# Patient Record
Sex: FEMALE | Race: BLACK OR AFRICAN AMERICAN | NOT HISPANIC OR LATINO | Employment: UNEMPLOYED | URBAN - METROPOLITAN AREA
[De-identification: names, ages, dates, MRNs, and addresses within clinical notes are randomized per-mention and may not be internally consistent; named-entity substitution may affect disease eponyms.]

---

## 2017-03-13 ENCOUNTER — THERAPY VISIT (OUTPATIENT)
Dept: PHYSICAL THERAPY | Facility: CLINIC | Age: 47
End: 2017-03-13
Payer: COMMERCIAL

## 2017-03-13 DIAGNOSIS — M54.41 BILATERAL LOW BACK PAIN WITH RIGHT-SIDED SCIATICA: Primary | ICD-10-CM

## 2017-03-13 PROCEDURE — 97161 PT EVAL LOW COMPLEX 20 MIN: CPT | Mod: GP | Performed by: PHYSICAL THERAPIST

## 2017-03-13 PROCEDURE — 97110 THERAPEUTIC EXERCISES: CPT | Mod: GP | Performed by: PHYSICAL THERAPIST

## 2017-03-13 NOTE — MR AVS SNAPSHOT
"              After Visit Summary   3/13/2017    Lay Solis    MRN: [id number]           Patient Information     Date Of Birth          1970        Visit Information        Provider Department      3/13/2017 5:10 PM uBcky Mahoney, PT [facility]        Today's Diagnoses     Bilateral low back pain with right-sided sciatica    -  1       Follow-ups after your visit        Your next 10 appointments already scheduled     Mar 15, 2017  4:40 PM CDT   [facility] with Lu Case, PTA   [facility] ([facility] [place])    [address] [address] [facility] [address] [facility] [address] [facility]  [address]  [place] [address] [phone]            Mar 21, 2017  4:40 PM CDT   [facility] with Lu Case, PTA   [facility] ([facility] [place])    [address] [facility] [address] [facility] [address] [facility]  [address]  [place] [id number] [phone]              Who to contact     If you have questions or need follow up information about today's clinic visit or your schedule please contact [facility] directly at [phone].  Normal or non-critical lab and imaging results will be communicated to you by Bizmorehart, letter or phone within 4 business days after the clinic has received the results. If you do not hear from us within 7 days, please contact the clinic through Bizmorehart or phone. If you have a critical or abnormal lab result, we will notify you by phone as soon as possible.  Submit refill requests through [employer] or call your pharmacy and they will forward the refill request to us. Please allow 3 business days for your refill to be completed.          Additional Information About Your Visit        Bizmorehart Information     Syn[employer]ricity lets you send messages to your doctor, view your test results, renew your prescriptions, schedule appointments and more. To sign up, go to [email or web address] . Click on \"Log in\" on the left side of the screen, which will take you to the Welcome page. Then click on \"Sign up Now\" on the right side of the page.     You will be asked to enter the access code " listed below, as well as some personal information. Please follow the directions to create your username and password.     Your access code is: [email or web address]  Expires: 2017  6:10 PM     Your access code will  in 90 days. If you need help or a new code, please call your [facility] clinic or [phone].        Care EveryWhere ID     This is your Care EveryWhere ID. This could be used by other organizations to access your [facility] medical records  [id number]         Blood Pressure from Last 3 Encounters:   No data found for BP    Weight from Last 3 Encounters:   No data found for Wt              We Performed the Following     PT Eval, Low Complexity ([id number])     Therapeutic Exercises        Primary Care Provider    None Specified       No primary provider on file.        Thank you!     Thank you for choosing [facility]  for your care. Our goal is always to provide you with excellent care. Hearing back from our patients is one way we can continue to improve our services. Please take a few minutes to complete the written survey that you may receive in the mail after your visit with us. Thank you!             Your Updated Medication List - Protect others around you: Learn how to safely use, store and throw away your medicines at www.disposemymeds.org.      Notice  As of 3/13/2017  6:10 PM    You have not been prescribed any medications.
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
## 2017-03-14 NOTE — PROGRESS NOTES
Subjective:                                       Pertinent medical history includes:  High blood pressure, thyroid problems and other (pain at night).  Medical allergies: no.  Other surgeries include:  Other.  Current medications:  None as reported by patient.  Current occupation is none.                                  Objective:    System    Physical Exam    General     ROS    Assessment/Plan:

## 2018-09-11 ENCOUNTER — OFFICE VISIT (OUTPATIENT)
Dept: FAMILY MEDICINE | Facility: CLINIC | Age: 48
End: 2018-09-11
Payer: COMMERCIAL

## 2018-09-11 VITALS
SYSTOLIC BLOOD PRESSURE: 132 MMHG | HEIGHT: 65 IN | OXYGEN SATURATION: 100 % | BODY MASS INDEX: 27.12 KG/M2 | WEIGHT: 162.8 LBS | HEART RATE: 91 BPM | RESPIRATION RATE: 18 BRPM | DIASTOLIC BLOOD PRESSURE: 84 MMHG | TEMPERATURE: 97.2 F

## 2018-09-11 DIAGNOSIS — M79.89 RIGHT LEG SWELLING: Primary | ICD-10-CM

## 2018-09-11 PROBLEM — E78.5 HYPERLIPIDEMIA: Status: ACTIVE | Noted: 2017-07-13

## 2018-09-11 PROBLEM — E11.29 TYPE 2 DIABETES MELLITUS WITH RENAL COMPLICATION (H): Status: ACTIVE | Noted: 2017-07-13

## 2018-09-11 PROBLEM — D64.9 ANEMIA: Status: ACTIVE | Noted: 2018-09-11

## 2018-09-11 PROCEDURE — 99203 OFFICE O/P NEW LOW 30 MIN: CPT | Performed by: NURSE PRACTITIONER

## 2018-09-11 RX ORDER — LISINOPRIL 40 MG/1
40 TABLET ORAL DAILY
COMMUNITY
Start: 2018-07-30 | End: 2018-11-15

## 2018-09-11 RX ORDER — LEVOTHYROXINE SODIUM 150 UG/1
1 TABLET ORAL DAILY
Refills: 0 | COMMUNITY
Start: 2018-08-06 | End: 2018-09-11

## 2018-09-11 RX ORDER — ASPIRIN 81 MG/1
81 TABLET ORAL DAILY
COMMUNITY
Start: 2017-07-13 | End: 2019-04-18

## 2018-09-11 RX ORDER — LISINOPRIL 40 MG/1
1 TABLET ORAL DAILY
Refills: 0 | COMMUNITY
Start: 2018-06-22 | End: 2018-09-11

## 2018-09-11 RX ORDER — LEVOTHYROXINE SODIUM 150 UG/1
150 TABLET ORAL DAILY
COMMUNITY
Start: 2018-07-31 | End: 2018-11-15

## 2018-09-11 ASSESSMENT — PAIN SCALES - GENERAL: PAINLEVEL: MILD PAIN (3)

## 2018-09-11 NOTE — PATIENT INSTRUCTIONS
[facility]'s central scheduling line at  [phone] for lymphedema therapy.     Lymphedema  The lymphatic system is made up of lymph vessels and lymph nodes, which carry a fluid called lymph. Lymph consists of waste from the cells. This fluid drains through lymph vessels under the skin to nearby lymph nodes. Lymph nodes filter waste products from the cells and kill any bacteria present before returning the lymph fluid to your blood circulation.  When the lymph vessels are damaged, lymph fluid cannot drain from tissues. This causes the lymph fluid to back up leading to swelling. This most often affects the arms or legs. Signs of lymphedema include heaviness, stiffness, or aching in an arm or leg. The limb may swell. The skin might look red. Shoes and rings may feel tight. Ankles and wrists might become less flexible.  The most common cause of damage to the lymph system is surgery or radiation for breast or testicular cancer. Other causes include repeated skin infections (cellulitis), burns, or injury to the arms or legs. It can take many years for symptoms of lymphedema to appear. Once present, lymphedema can become a chronic condition. This means the problem can be managed but not cured.   Treatment often includes use of compression garments, massage, and special exercises. Talk to your healthcare provider about these therapies and best treatment plan for you.  Home care  You can help keep the condition from getting worse. Follow all instructions you have been given. Do your exercises and wear your compression garments as recommended. Also, care for yourself as instructed.     Small skin injuries like a cut, burn, or insect bite are more likely to cause a skin infection. Take special care to avoid injury. If you have any signs of infection, call your healthcare provider right away.    Take care of your skin and nails. Moisturize dry skin. Wear protective gloves when doing chores such as  gardening.     Don't wear tight clothing or jewelry on the affected arm or leg. Avoid carrying bags or other weight on the affected arm.    Save with an electric razor instead of a razor blade.    If at all possible, don t have blood pressure taken, get injections, or have blood drawn in the affected arm.    If a leg is involved, don t cross your legs when sitting. Don't go barefoot.    Avoid hot tubs, steam rooms, and saunas.  If you are at risk for lymphedema but have not developed it, these tips can help also help prevent it. Follow your healthcare provider's instructions.  Follow-up care  Follow up with your healthcare provider, or as advised.  Lymphedema can change the appearance of your body. This can be emotionally difficult to adjust to. You may benefit from a support group where practical advice and emotional support is offered. Individual counseling is another option.  When to seek medical advice  Call your healthcare provider right away if any of these occur:    Swelling worsens    Rash, blistering, or other skin changes on the affected limb    Area of skin becomes red, painful, or warm to the touch    A wound increases in pain, becomes warm, drains pus, or radiates red streaks    Fever of 100.4 F (38 C) or higher, or as directed by your healthcare provider  Date Last Reviewed: 10/1/2016    0518-1695 [employer]. [address] [place] [address]. All rights reserved. This information is not intended as a substitute for professional medical care. Always follow your healthcare professional's instructions.      [facility]    If you have any questions regarding to your visit please contact your care team:     Team Pink:   Clinic Hours Telephone Number   Internal Medicine:  Dr. Angelica Devine NP 7am-7pm  Monday - Thursday   7am-5pm  Fridays  [phone]  (Appointment scheduling available 24/7)   Urgent Care - [facility]  [place] - 11am-9pm Monday-Friday Saturday-Sunday- 9am-5pm   [place] - 5pm-9pm Monday-Friday Saturday-Sunday- 9am-5pm  [phone] - [place]  [phone] - [place]       What options do I have for a visit other than an office visit? We offer electronic visits (e-visits) and telephone visits, when medically appropriate.  Please check with your medical insurance to see if these types of visits are covered, as you will be responsible for any charges that are not paid by your insurance.      You can use [employer] (secure electronic communication) to access to your chart, send your primary care provider a message, or make an appointment. Ask a team member how to get started.     For a price quote for your services, please call our Consumer Price Line at [phone] or our Imaging Cost estimation line at [phone] (for imaging tests).  Dorys Hardy CMA

## 2018-09-11 NOTE — PROGRESS NOTES
SUBJECTIVE:   Lay Solis is a 48 year old female who presents to clinic today for the following health issues:        Chief Complaint   Patient presents with     Swelling     right leg swelling- started 4 months ago it comes and goes     Patient notes ongoing swelling for 4 months to her right leg.  She had negative ultrasound for DVT 2 months ago.  Patient had varicose vein stripping 3 months ago to her right lower leg.  Patient notes persistent swelling to her right leg.  Swelling is intermittent.  Patient notes worsening symptoms after prolonged walking, in excessive heat.  Patient denies pain.  Patient has compression socks, but does not wear them consistently.  Patient denies chest pain, shortness of breath.    Problem list and histories reviewed & adjusted, as indicated.  Additional history: as documented    Patient Active Problem List   Diagnosis     Anemia     History of thyroid cancer     Hyperlipidemia     Hypertension, benign     Postsurgical hypothyroidism     Thyroid cancer (H)     Type 2 diabetes mellitus with renal complication (H)     Past Surgical History:   Procedure Laterality Date     ENT SURGERY      partial thyroidectomy     GI SURGERY      cesearean section       Social History   Substance Use Topics     Smoking status: Never Smoker     Smokeless tobacco: Never Used     Alcohol use No     History reviewed. No pertinent family history.      Current Outpatient Prescriptions   Medication Sig Dispense Refill     aspirin 81 MG EC tablet Take 81 mg by mouth daily       levothyroxine (SYNTHROID/LEVOTHROID) 150 MCG tablet Take 150 mcg by mouth daily       lisinopril (PRINIVIL/ZESTRIL) 40 MG tablet Take 40 mg by mouth daily       No Known Allergies  BP Readings from Last 3 Encounters:   09/11/18 132/84    Wt Readings from Last 3 Encounters:   09/11/18 162 lb 12.8 oz (73.8 kg)                  Labs reviewed in EPIC    Reviewed and updated as needed this visit by clinical staff       Reviewed and  "updated as needed this visit by Provider         ROS:  Constitutional, HEENT, cardiovascular, pulmonary, gi and gu systems are negative, except as otherwise noted.    OBJECTIVE:     /84  Pulse 91  Temp 97.2  F (36.2  C) (Oral)  Resp 18  Ht 5' 5\" (1.651 m)  Wt 162 lb 12.8 oz (73.8 kg)  LMP 08/20/2018 (Approximate)  SpO2 100%  BMI 27.09 kg/m2  Body mass index is 27.09 kg/(m^2).  GENERAL: healthy, alert and no distress  RESP: lungs clear to auscultation - no rales, rhonchi or wheezes  CV: regular rates and rhythm, normal S1 S2, no S3 or S4, no murmur, click or rub, peripheral pulses strong and 1-2+ right lower extremity pitting edema to mid calf    MS: no gross musculoskeletal defects noted, no edema    Diagnostic Test Results:  none     ASSESSMENT/PLAN:     1. Right leg swelling  Patient has had negative ultrasound since swelling started.  No signs of congestive heart failure and she has normal recent labs through her previous PCP.  Likely lymphedema vs chronic venous insufficiency.  Patient agrees to lymphedema therapy.  She also plans to touch base with her vein specialist as well.  - LYMPHEDEMA THERAPY REFERRAL    FUTURE APPOINTMENTS:       - Follow-up for annual visit or as needed    SUKHI Knapp [facility] LIL[facility]    "

## 2018-09-11 NOTE — MR AVS SNAPSHOT
After Visit Summary   9/11/2018    Lay Solis    MRN: [id number]           Patient Information     Date Of Birth          1970        Visit Information        Provider Department      9/11/2018 4:00 PM Sisi Devine APRN [facility]        Today's Diagnoses     Right leg swelling    -  1      Care Instructions    [facility] at  [phone] for lymphedema therapy.     Lymphedema  The lymphatic system is made up of lymph vessels and lymph nodes, which carry a fluid called lymph. Lymph consists of waste from the cells. This fluid drains through lymph vessels under the skin to nearby lymph nodes. Lymph nodes filter waste products from the cells and kill any bacteria present before returning the lymph fluid to your blood circulation.  When the lymph vessels are damaged, lymph fluid cannot drain from tissues. This causes the lymph fluid to back up leading to swelling. This most often affects the arms or legs. Signs of lymphedema include heaviness, stiffness, or aching in an arm or leg. The limb may swell. The skin might look red. Shoes and rings may feel tight. Ankles and wrists might become less flexible.  The most common cause of damage to the lymph system is surgery or radiation for breast or testicular cancer. Other causes include repeated skin infections (cellulitis), burns, or injury to the arms or legs. It can take many years for symptoms of lymphedema to appear. Once present, lymphedema can become a chronic condition. This means the problem can be managed but not cured.   Treatment often includes use of compression garments, massage, and special exercises. Talk to your healthcare provider about these therapies and best treatment plan for you.  Home care  You can help keep the condition from getting worse. Follow all instructions you have been given. Do your exercises and wear your compression garments as  recommended. Also, care for yourself as instructed.     Small skin injuries like a cut, burn, or insect bite are more likely to cause a skin infection. Take special care to avoid injury. If you have any signs of infection, call your healthcare provider right away.    Take care of your skin and nails. Moisturize dry skin. Wear protective gloves when doing chores such as gardening.     Don't wear tight clothing or jewelry on the affected arm or leg. Avoid carrying bags or other weight on the affected arm.    Save with an electric razor instead of a razor blade.    If at all possible, don t have blood pressure taken, get injections, or have blood drawn in the affected arm.    If a leg is involved, don t cross your legs when sitting. Don't go barefoot.    Avoid hot tubs, steam rooms, and saunas.  If you are at risk for lymphedema but have not developed it, these tips can help also help prevent it. Follow your healthcare provider's instructions.  Follow-up care  Follow up with your healthcare provider, or as advised.  Lymphedema can change the appearance of your body. This can be emotionally difficult to adjust to. You may benefit from a support group where practical advice and emotional support is offered. Individual counseling is another option.  When to seek medical advice  Call your healthcare provider right away if any of these occur:    Swelling worsens    Rash, blistering, or other skin changes on the affected limb    Area of skin becomes red, painful, or warm to the touch    A wound increases in pain, becomes warm, drains pus, or radiates red streaks    Fever of 100.4 F (38 C) or higher, or as directed by your healthcare provider  Date Last Reviewed: 10/1/2016    8806-8837 [employer]. [address] [place] [address]. All rights reserved. This information is not intended as a substitute for professional medical care. Always follow your healthcare professional's  instructions.      [facility]    If you have any questions regarding to your visit please contact your care team:     Team Pink:   Clinic Hours Telephone Number   Internal Medicine:  Dr. Angelica Devine NP 7am-7pm  Monday - Thursday   7am-5pm  Fridays  [phone]  (Appointment scheduling available 24/7)   Urgent Care - [place] and [facility] - 11am-9pm Monday-Friday Saturday-Sunday- 9am-5pm   [place] - 5pm-9pm Monday-Friday Saturday-Sunday- 9am-5pm  [phone] - [place]  [phone] - [place]       What options do I have for a visit other than an office visit? We offer electronic visits (e-visits) and telephone visits, when medically appropriate.  Please check with your medical insurance to see if these types of visits are covered, as you will be responsible for any charges that are not paid by your insurance.      You can use Service Seeking (secure electronic communication) to access to your chart, send your primary care provider a message, or make an appointment. Ask a team member how to get started.     For a price quote for your services, please call our Consumer Price Line at [phone] or our Imaging Cost estimation line at [phone] (for imaging tests).  Dorys Hardy CMA             Follow-ups after your visit        Additional Services     LYMPHEDEMA THERAPY REFERRAL       *This therapy referral will be filtered to a centralized scheduling office at [facility] and the patient will receive a call to schedule an appointment at a [place] location most convenient for them. *   If you have not heard from the scheduling office within 2 business days, please call [phone] for all locations, with the exception of [place], please call [phone].     Olegario August    Treatment: PT or OT Evaluation & Treatment  Special Instructions:   PT/OT Treatment Diagnosis: Edema    Please be aware that coverage of  "these services is subject to the terms and limitations of your health insurance plan.  Call member services at your health plan with any benefit or coverage questions.      **Note to Provider:  If you are referring outside of [place] for the therapy appointment, please list the name of the location in the \"special instructions\" above, print the referral and give to the patient to schedule the appointment.                  Who to contact     If you have questions or need follow up information about today's clinic visit or your schedule please contact [facility] directly at [phone].  Normal or non-critical lab and imaging results will be communicated to you by MyChart, letter or phone within 4 business days after the clinic has received the results. If you do not hear from us within 7 days, please contact the clinic through MyChart or phone. If you have a critical or abnormal lab result, we will notify you by phone as soon as possible.  Submit refill requests through TrendingGames or call your pharmacy and they will forward the refill request to us. Please allow 3 business days for your refill to be completed.          Additional Information About Your Visit        Care EveryWhere ID     This is your Care EveryWhere ID. This could be used by other organizations to access your [place] medical records  [id number]        Your Vitals Were     Pulse Temperature Respirations Height Last Period Pulse Oximetry    91 97.2  F (36.2  C) (Oral) 18 5' 5\" (1.651 m) 08/20/2018 (Approximate) 100%    BMI (Body Mass Index)                   27.09 kg/m2            Blood Pressure from Last 3 Encounters:   09/11/18 132/84    Weight from Last 3 Encounters:   09/11/18 162 lb 12.8 oz (73.8 kg)              We Performed the Following     LYMPHEDEMA THERAPY REFERRAL        Primary Care Provider Fax #    Physician No Ref-Primary [phone]       No address on file        Equal Access to Services     CATHY WAITE AH: Anibal humphries " irma [place], elisha soljuanha, qadilan kagael alyssaserene, waxdenia tasha dellhelio shahjoshua yesikafaribaangeli waltersZackbrooklynn yoni. So [facility] [phone].    ATENCIÓN: Si habla español, tiene a faustin disposición servicios gratuitos de asistencia lingüística. Rachel al [phone].    We comply with applicable federal civil rights laws and [place] laws. We do not discriminate on the basis of race, color, national origin, age, disability, sex, sexual orientation, or gender identity.            Thank you!     [facility]  [facility] your care. Our goal is always to provide you with excellent care. Hearing back from our patients is one way we can continue to improve our services. Please take a few minutes to complete the written survey that you may receive in the mail after your visit with us. Thank you!             Your Updated Medication List - Protect others around you: Learn how to safely use, store and throw away your medicines at www.disposemymeds.org.          This list is accurate as of 9/11/18  4:58 PM.  Always use your most recent med list.                   Brand Name Dispense Instructions for use Diagnosis    aspirin 81 MG EC tablet      Take 81 mg by mouth daily        levothyroxine 150 MCG tablet    SYNTHROID/LEVOTHROID     Take 150 mcg by mouth daily        lisinopril 40 MG tablet    PRINIVIL/ZESTRIL     Take 40 mg by mouth daily

## 2018-11-15 ENCOUNTER — OFFICE VISIT (OUTPATIENT)
Dept: FAMILY MEDICINE | Facility: CLINIC | Age: 48
End: 2018-11-15
Payer: COMMERCIAL

## 2018-11-15 VITALS
OXYGEN SATURATION: 100 % | DIASTOLIC BLOOD PRESSURE: 98 MMHG | HEART RATE: 85 BPM | TEMPERATURE: 96.6 F | HEIGHT: 65 IN | RESPIRATION RATE: 20 BRPM | SYSTOLIC BLOOD PRESSURE: 146 MMHG | BODY MASS INDEX: 26.66 KG/M2 | WEIGHT: 160 LBS

## 2018-11-15 DIAGNOSIS — Z11.4 SCREENING FOR HIV (HUMAN IMMUNODEFICIENCY VIRUS): ICD-10-CM

## 2018-11-15 DIAGNOSIS — Z23 NEED FOR PROPHYLACTIC VACCINATION WITH TETANUS-DIPHTHERIA (TD): ICD-10-CM

## 2018-11-15 DIAGNOSIS — Z13.89 SCREENING FOR DIABETIC PERIPHERAL NEUROPATHY: ICD-10-CM

## 2018-11-15 DIAGNOSIS — Z12.4 CERVICAL CANCER SCREENING: ICD-10-CM

## 2018-11-15 DIAGNOSIS — Z23 NEED FOR PROPHYLACTIC VACCINATION AND INOCULATION AGAINST INFLUENZA: ICD-10-CM

## 2018-11-15 DIAGNOSIS — E11.22 TYPE 2 DIABETES MELLITUS WITH CHRONIC KIDNEY DISEASE, WITHOUT LONG-TERM CURRENT USE OF INSULIN, UNSPECIFIED CKD STAGE (H): ICD-10-CM

## 2018-11-15 DIAGNOSIS — D50.9 IRON DEFICIENCY ANEMIA, UNSPECIFIED IRON DEFICIENCY ANEMIA TYPE: ICD-10-CM

## 2018-11-15 DIAGNOSIS — Z12.31 ENCOUNTER FOR SCREENING MAMMOGRAM FOR BREAST CANCER: ICD-10-CM

## 2018-11-15 DIAGNOSIS — Z23 NEED FOR PROPHYLACTIC VACCINATION AGAINST STREPTOCOCCUS PNEUMONIAE (PNEUMOCOCCUS): ICD-10-CM

## 2018-11-15 DIAGNOSIS — E78.5 HYPERLIPIDEMIA, UNSPECIFIED HYPERLIPIDEMIA TYPE: ICD-10-CM

## 2018-11-15 DIAGNOSIS — C73 THYROID CANCER (H): ICD-10-CM

## 2018-11-15 DIAGNOSIS — E89.0 POSTSURGICAL HYPOTHYROIDISM: ICD-10-CM

## 2018-11-15 DIAGNOSIS — Z00.00 ROUTINE HISTORY AND PHYSICAL EXAMINATION OF ADULT: Primary | ICD-10-CM

## 2018-11-15 DIAGNOSIS — I10 BENIGN ESSENTIAL HYPERTENSION: ICD-10-CM

## 2018-11-15 LAB
ANION GAP SERPL CALCULATED.3IONS-SCNC: 4 MMOL/L (ref 3–14)
BASOPHILS # BLD AUTO: 0.1 10E9/L (ref 0–0.2)
BASOPHILS NFR BLD AUTO: 0.9 %
BUN SERPL-MCNC: 11 MG/DL (ref 7–30)
CALCIUM SERPL-MCNC: 8.5 MG/DL (ref 8.5–10.1)
CHLORIDE SERPL-SCNC: 104 MMOL/L (ref 94–109)
CHOLEST SERPL-MCNC: 169 MG/DL
CO2 SERPL-SCNC: 29 MMOL/L (ref 20–32)
CREAT SERPL-MCNC: 0.68 MG/DL (ref 0.52–1.04)
CREAT UR-MCNC: 104 MG/DL
DIFFERENTIAL METHOD BLD: ABNORMAL
EOSINOPHIL # BLD AUTO: 0.2 10E9/L (ref 0–0.7)
EOSINOPHIL NFR BLD AUTO: 2.8 %
ERYTHROCYTE [DISTWIDTH] IN BLOOD BY AUTOMATED COUNT: 18.5 % (ref 10–15)
FERRITIN SERPL-MCNC: 3 NG/ML (ref 8–252)
GFR SERPL CREATININE-BSD FRML MDRD: >90 ML/MIN/1.7M2
GLUCOSE SERPL-MCNC: 104 MG/DL (ref 70–99)
HBA1C MFR BLD: 6.1 % (ref 0–5.6)
HCT VFR BLD AUTO: 28.2 % (ref 35–47)
HDLC SERPL-MCNC: 51 MG/DL
HGB BLD-MCNC: 8.3 G/DL (ref 11.7–15.7)
IRON SATN MFR SERPL: 4 % (ref 15–46)
IRON SERPL-MCNC: 14 UG/DL (ref 35–180)
LDLC SERPL CALC-MCNC: 98 MG/DL
LYMPHOCYTES # BLD AUTO: 0.9 10E9/L (ref 0.8–5.3)
LYMPHOCYTES NFR BLD AUTO: 14.8 %
MCH RBC QN AUTO: 20.6 PG (ref 26.5–33)
MCHC RBC AUTO-ENTMCNC: 29.4 G/DL (ref 31.5–36.5)
MCV RBC AUTO: 70 FL (ref 78–100)
MICROALBUMIN UR-MCNC: 111 MG/L
MICROALBUMIN/CREAT UR: 106.73 MG/G CR (ref 0–25)
MONOCYTES # BLD AUTO: 0.4 10E9/L (ref 0–1.3)
MONOCYTES NFR BLD AUTO: 6.2 %
NEUTROPHILS # BLD AUTO: 4.4 10E9/L (ref 1.6–8.3)
NEUTROPHILS NFR BLD AUTO: 75.3 %
NONHDLC SERPL-MCNC: 118 MG/DL
PLATELET # BLD AUTO: 365 10E9/L (ref 150–450)
POTASSIUM SERPL-SCNC: 3.6 MMOL/L (ref 3.4–5.3)
RBC # BLD AUTO: 4.02 10E12/L (ref 3.8–5.2)
SODIUM SERPL-SCNC: 137 MMOL/L (ref 133–144)
T4 FREE SERPL-MCNC: 1.07 NG/DL (ref 0.76–1.46)
TIBC SERPL-MCNC: 356 UG/DL (ref 240–430)
TRIGL SERPL-MCNC: 102 MG/DL
TSH SERPL DL<=0.005 MIU/L-ACNC: 0.29 MU/L (ref 0.4–4)
WBC # BLD AUTO: 5.8 10E9/L (ref 4–11)

## 2018-11-15 PROCEDURE — 82728 ASSAY OF FERRITIN: CPT | Performed by: NURSE PRACTITIONER

## 2018-11-15 PROCEDURE — 83036 HEMOGLOBIN GLYCOSYLATED A1C: CPT | Performed by: NURSE PRACTITIONER

## 2018-11-15 PROCEDURE — 84443 ASSAY THYROID STIM HORMONE: CPT | Performed by: NURSE PRACTITIONER

## 2018-11-15 PROCEDURE — 84439 ASSAY OF FREE THYROXINE: CPT | Performed by: NURSE PRACTITIONER

## 2018-11-15 PROCEDURE — 99207 C FOOT EXAM  NO CHARGE: CPT | Mod: 25 | Performed by: NURSE PRACTITIONER

## 2018-11-15 PROCEDURE — 83540 ASSAY OF IRON: CPT | Performed by: NURSE PRACTITIONER

## 2018-11-15 PROCEDURE — 82043 UR ALBUMIN QUANTITATIVE: CPT | Performed by: NURSE PRACTITIONER

## 2018-11-15 PROCEDURE — 83550 IRON BINDING TEST: CPT | Performed by: NURSE PRACTITIONER

## 2018-11-15 PROCEDURE — 80048 BASIC METABOLIC PNL TOTAL CA: CPT | Performed by: NURSE PRACTITIONER

## 2018-11-15 PROCEDURE — 99396 PREV VISIT EST AGE 40-64: CPT | Performed by: NURSE PRACTITIONER

## 2018-11-15 PROCEDURE — G0145 SCR C/V CYTO,THINLAYER,RESCR: HCPCS | Performed by: NURSE PRACTITIONER

## 2018-11-15 PROCEDURE — 36415 COLL VENOUS BLD VENIPUNCTURE: CPT | Performed by: NURSE PRACTITIONER

## 2018-11-15 PROCEDURE — 87624 HPV HI-RISK TYP POOLED RSLT: CPT | Performed by: NURSE PRACTITIONER

## 2018-11-15 PROCEDURE — 80061 LIPID PANEL: CPT | Performed by: NURSE PRACTITIONER

## 2018-11-15 PROCEDURE — 85025 COMPLETE CBC W/AUTO DIFF WBC: CPT | Performed by: NURSE PRACTITIONER

## 2018-11-15 RX ORDER — LISINOPRIL 40 MG/1
40 TABLET ORAL DAILY
Qty: 90 TABLET | Refills: 0 | Status: SHIPPED | OUTPATIENT
Start: 2018-11-15 | End: 2019-02-19

## 2018-11-15 RX ORDER — LEVOTHYROXINE SODIUM 150 UG/1
150 TABLET ORAL DAILY
Qty: 90 TABLET | Refills: 3 | Status: SHIPPED | OUTPATIENT
Start: 2018-11-15 | End: 2019-11-21

## 2018-11-15 ASSESSMENT — PAIN SCALES - GENERAL: PAINLEVEL: NO PAIN (0)

## 2018-11-15 NOTE — PATIENT INSTRUCTIONS
Preventive Health Recommendations  Female Ages 40 to 49    Yearly exam:     See your health care provider every year in order to  1. Review health changes.   2. Discuss preventive care.    3. Review your medicines if your doctor prescribed any.      Get a Pap test every three years (unless you have an abnormal result and your provider advises testing more often).      If you get Pap tests with HPV test, you only need to test every 5 years, unless you have an abnormal result. You do not need a Pap test if your uterus was removed (hysterectomy) and you have not had cancer.      You should be tested each year for STDs (sexually transmitted diseases), if you're at risk.     Ask your doctor if you should have a mammogram.      Have a colonoscopy (test for colon cancer) if someone in your family has had colon cancer or polyps before age 50.       Have a cholesterol test every 5 years.       Have a diabetes test (fasting glucose) after age 45. If you are at risk for diabetes, you should have this test every 3 years.    Shots: Get a flu shot each year. Get a tetanus shot every 10 years.     Nutrition:     Eat at least 5 servings of fruits and vegetables each day.    Eat whole-grain bread, whole-wheat pasta and brown rice instead of white grains and rice.    Get adequate Calcium and Vitamin D.      Lifestyle    Exercise at least 150 minutes a week (an average of 30 minutes a day, 5 days a week). This will help you control your weight and prevent disease.    Limit alcohol to one drink per day.    No smoking.     Wear sunscreen to prevent skin cancer.    See your dentist every six months for an exam and cleaning.      [facility]    If you have any questions regarding to your visit please contact your care team:     Team Pink:   Clinic Hours Telephone Number   Internal Medicine:  Dr. Angelica Devine NP 7am-7pm  Monday - Thursday   7am-5pm  Fridays  [phone]  (Appointment  scheduling available 24/7)   Urgent Care - [place] - 11am-9pm Monday-Friday Saturday-Sunday- 9am-5pm   [place] - 5pm-9pm Monday-Friday Saturday-Sunday- 9am-5pm  [phone] - [place]  [phone] - [place]       What options do I have for a visit other than an office visit? We offer electronic visits (e-visits) and telephone visits, when medically appropriate.  Please check with your medical insurance to see if these types of visits are covered, as you will be responsible for any charges that are not paid by your insurance.      You can use Atari (secure electronic communication) to access to your chart, send your primary care provider a message, or make an appointment. Ask a team member how to get started.     For a price quote for your services, please call our Consumer Price Line at [phone] or our Imaging Cost estimation line at [phone] (for imaging tests).  Dorys MENDES CMA

## 2018-11-15 NOTE — MR AVS SNAPSHOT
After Visit Summary   11/15/2018    Lay Solis    MRN: [id number]           Patient Information     Date Of Birth          1970        Visit Information        Provider Department      11/15/2018 7:40 AM Sisi Devine APRN [facility]        Today's Diagnoses     Routine history and physical examination of adult    -  1    Screening for HIV (human immunodeficiency virus)        Screening for diabetic peripheral neuropathy        Need for prophylactic vaccination and inoculation against influenza        Need for prophylactic vaccination against Streptococcus pneumoniae (pneumococcus)        Need for prophylactic vaccination with tetanus-diphtheria (Td)        Cervical cancer screening        Encounter for screening mammogram for breast cancer        Iron deficiency anemia, unspecified iron deficiency anemia type        Type 2 diabetes mellitus with chronic kidney disease, without long-term current use of insulin, unspecified CKD stage (H)        Thyroid cancer (H)        Hyperlipidemia, unspecified hyperlipidemia type        Postsurgical hypothyroidism        Benign essential hypertension          Care Instructions      Preventive Health Recommendations  Female Ages 40 to 49    Yearly exam:     See your health care provider every year in order to  1. Review health changes.   2. Discuss preventive care.    3. Review your medicines if your doctor prescribed any.      Get a Pap test every three years (unless you have an abnormal result and your provider advises testing more often).      If you get Pap tests with HPV test, you only need to test every 5 years, unless you have an abnormal result. You do not need a Pap test if your uterus was removed (hysterectomy) and you have not had cancer.      You should be tested each year for STDs (sexually transmitted diseases), if you're at risk.     Ask your doctor if you should have a mammogram.      Have a colonoscopy (test for  colon cancer) if someone in your family has had colon cancer or polyps before age 50.       Have a cholesterol test every 5 years.       Have a diabetes test (fasting glucose) after age 45. If you are at risk for diabetes, you should have this test every 3 years.    Shots: Get a flu shot each year. Get a tetanus shot every 10 years.     Nutrition:     Eat at least 5 servings of fruits and vegetables each day.    Eat whole-grain bread, whole-wheat pasta and brown rice instead of white grains and rice.    Get adequate Calcium and Vitamin D.      Lifestyle    Exercise at least 150 minutes a week (an average of 30 minutes a day, 5 days a week). This will help you control your weight and prevent disease.    Limit alcohol to one drink per day.    No smoking.     Wear sunscreen to prevent skin cancer.    See your dentist every six months for an exam and cleaning.      [facility]    If you have any questions regarding to your visit please contact your care team:     Team Pink:   Clinic Hours Telephone Number   Internal Medicine:  Dr. Angelica Devine NP 7am-7pm  Monday - Thursday   7am-5pm  Fridays  [phone]  (Appointment scheduling available 24/7)   [facility] - 11am-9pm Monday-Friday Saturday-Sunday- 9am-5pm   [place] - 5pm-9pm Monday-Friday Saturday-Sunday- 9am-5pm  [phone] - [place]  [phone] - [place]       What options do I have for a visit other than an office visit? We offer electronic visits (e-visits) and telephone visits, when medically appropriate.  Please check with your medical insurance to see if these types of visits are covered, as you will be responsible for any charges that are not paid by your insurance.      You can use Booktrope (secure electronic communication) to access to your chart, send your primary care provider a message, or make an appointment. Ask a team member how to get started.  "    For a price quote for your services, please call our Consumer Price Line at [phone] or our Imaging Cost estimation line at [phone] (for imaging tests).  Dorys MENDES CMA            Follow-ups after your visit        Follow-up notes from your care team     Return in about 2 weeks (around 11/29/2018) for BP Recheck.      Future tests that were ordered for you today     Open Future Orders        Priority Expected Expires Ordered    *MA Screening Digital Bilateral Routine  11/15/2019 11/15/2018            Who to contact     If you have questions or need follow up information about today's clinic visit or your schedule please contact [facility] directly at [phone].  Normal or non-critical lab and imaging results will be communicated to you by MyChart, letter or phone within 4 business days after the clinic has received the results. If you do not hear from us within 7 days, please contact the clinic through MyChart or phone. If you have a critical or abnormal lab result, we will notify you by phone as soon as possible.  Submit refill requests through Tiempo or call your pharmacy and they will forward the refill request to us. Please allow 3 business days for your refill to be completed.          Additional Information About Your Visit        Care EveryWhere ID     This is your Care EveryWhere ID. This could be used by other organizations to access your [place] medical records  [id number]        Your Vitals Were     Pulse Temperature Respirations Height Pulse Oximetry BMI (Body Mass Index)    85 96.6  F (35.9  C) (Oral) 20 5' 5\" (1.651 m) 100% 26.63 kg/m2       Blood Pressure from Last 3 Encounters:   11/15/18 (!) 146/98   09/11/18 132/84    Weight from Last 3 Encounters:   11/15/18 160 lb (72.6 kg)   09/11/18 162 lb 12.8 oz (73.8 kg)              We Performed the Following     Albumin Random Urine Quantitative with Creat Ratio     Basic metabolic panel     CBC with platelets differential  "    Ferritin     FOOT EXAM  NO CHARGE [[id number]]     HEMOGLOBIN A1C     HPV High Risk Types DNA Cervical     Iron and iron binding capacity     Lipid panel reflex to direct LDL Fasting     Pap imaged thin layer screen with HPV - recommended age 30 - 65 years (select HPV order below)     TSH with free T4 reflex          Where to get your medicines      These medications were sent to [id number] - [place], [place] - [address]  [address], [place] [address]     Phone:  [phone]     levothyroxine 150 MCG tablet    lisinopril 40 MG tablet          Primary Care Provider Fax #    Physician No Ref-Primary [phone]       No address on file        Equal Access to Services     [facility] : Hadii yandel [place], waaxda lusantana, qaybta kaalmada kate, nikki flores . So [facility] [place] [facility] [phone].    ATENCIÓN: Si habla español, tiene a faustin disposición servicios gratuitos de asistencia lingüística. [facility] [phone].    We comply with applicable federal civil rights laws and [place] laws. We do not discriminate on the basis of race, color, national origin, age, disability, sex, sexual orientation, or gender identity.            Thank you!     Thank you for choosing [facility]  for your care. Our goal is always to provide you with excellent care. Hearing back from our patients is one way we can continue to improve our services. Please take a few minutes to complete the written survey that you may receive in the mail after your visit with us. Thank you!             Your Updated Medication List - Protect others around you: Learn how to safely use, store and throw away your medicines at www.disposemymeds.org.          This list is accurate as of 11/15/18  8:27 AM.  Always use your most recent med list.                   Brand Name Dispense Instructions for use Diagnosis    aspirin 81 MG EC tablet      Take 81 mg by mouth daily        levothyroxine 150 MCG  tablet    SYNTHROID/LEVOTHROID    90 tablet    Take 1 tablet (150 mcg) by mouth daily    Postsurgical hypothyroidism       lisinopril 40 MG tablet    PRINIVIL/ZESTRIL    90 tablet    Take 1 tablet (40 mg) by mouth daily    Benign essential hypertension

## 2018-11-15 NOTE — PROGRESS NOTES
SUBJECTIVE:   CC: Lay Solis is an 48 year old woman who presents for preventive health visit.     Healthy Habits:    Do you get at least three servings of calcium containing foods daily (dairy, green leafy vegetables, etc.)? yes    Amount of exercise or daily activities, outside of work: 3 day(s) per week    Problems taking medications regularly No    Medication side effects: No    Have you had an eye exam in the past two years? yes    Do you see a dentist twice per year? yes    Do you have sleep apnea, excessive snoring or daytime drowsiness?no      Diabetes Follow-up    Patient is checking blood sugars: once daily.  Results are as follows:         am - 120    Diabetic concerns: None     Symptoms of hypoglycemia (low blood sugar): none     Paresthesias (numbness or burning in feet) or sores: No     Date of last diabetic eye exam: Up to date    Diabetes Management Resources    Hyperlipidemia Follow-Up      Rate your low fat/cholesterol diet?: good    Taking statin?  No    Other lipid medications/supplements?:  none    Hypertension Follow-up      Outpatient blood pressures are not being checked.    Low Salt Diet: no added salt    Patient has been out of lisinopril for the past 4 days.    BP Readings from Last 2 Encounters:   11/15/18 (!) 146/98   09/11/18 132/84     Hemoglobin A1C (%)   Date Value   11/15/2018 6.1 (H)       Today's PHQ-2 Score:   PHQ-2 ( 1999 Pfizer) 11/15/2018   Q1: Little interest or pleasure in doing things 0   Q2: Feeling down, depressed or hopeless 0   PHQ-2 Score 0       Abuse: Current or Past(Physical, Sexual or Emotional)- No  Do you feel safe in your environment - Yes    Social History   Substance Use Topics     Smoking status: Never Smoker     Smokeless tobacco: Never Used     Alcohol use No     If you drink alcohol do you typically have >3 drinks per day or >7 drinks per week? No                     Reviewed orders with patient.  Reviewed health maintenance and updated orders  accordingly - Yes  Labs reviewed in EPIC  BP Readings from Last 3 Encounters:   11/15/18 (!) 146/98   09/11/18 132/84    Wt Readings from Last 3 Encounters:   11/15/18 160 lb (72.6 kg)   09/11/18 162 lb 12.8 oz (73.8 kg)                  Patient Active Problem List   Diagnosis     Anemia     History of thyroid cancer     Hyperlipidemia     Hypertension, benign     Postsurgical hypothyroidism     Thyroid cancer (H)     Type 2 diabetes mellitus with renal complication (H)     Past Surgical History:   Procedure Laterality Date     ENT SURGERY      partial thyroidectomy     GI SURGERY      cesearean section       Social History   Substance Use Topics     Smoking status: Never Smoker     Smokeless tobacco: Never Used     Alcohol use No     History reviewed. No pertinent family history.      No Known Allergies  Recent Labs   Lab Test  11/15/18   0836  01/21/11   1655   A1C  6.1*   --    TSH   --   9.39*        Patient under age 50, mutual decision reflected in health maintenance.      Pertinent mammograms are reviewed under the imaging tab.  History of abnormal Pap smear: NO - age 30- 65 PAP every 3 years recommended     Reviewed and updated as needed this visit by clinical staff  Tobacco  Allergies  Meds  Med Hx  Surg Hx  Fam Hx  Soc Hx        Reviewed and updated as needed this visit by Provider  Meds            ROS:  CONSTITUTIONAL: NEGATIVE for fever, chills, change in weight  INTEGUMENTARU/SKIN: NEGATIVE for worrisome rashes, moles or lesions  EYES: NEGATIVE for vision changes or irritation  ENT: NEGATIVE for ear, mouth and throat problems  RESP: NEGATIVE for significant cough or SOB  BREAST: NEGATIVE for masses, tenderness or discharge  CV: NEGATIVE for chest pain, palpitations or peripheral edema  GI: NEGATIVE for nausea, abdominal pain, heartburn, or change in bowel habits  : NEGATIVE for unusual urinary or vaginal symptoms. Periods are regular.  MUSCULOSKELETAL: NEGATIVE for significant arthralgias or  "myalgia  NEURO: NEGATIVE for weakness, dizziness or paresthesias  PSYCHIATRIC: NEGATIVE for changes in mood or affect    OBJECTIVE:   BP (!) 146/98  Pulse 85  Temp 96.6  F (35.9  C) (Oral)  Resp 20  Ht 5' 5\" (1.651 m)  Wt 160 lb (72.6 kg)  SpO2 100%  BMI 26.63 kg/m2  EXAM:  GENERAL: healthy, alert and no distress  EYES: Eyes grossly normal to inspection, PERRL and conjunctivae and sclerae normal  HENT: ear canals and TM's normal, nose and mouth without ulcers or lesions  NECK: no adenopathy, no asymmetry, masses, or scars and thyroid normal to palpation  RESP: lungs clear to auscultation - no rales, rhonchi or wheezes  BREAST: normal without masses, tenderness or nipple discharge and no palpable axillary masses or adenopathy  CV: regular rate and rhythm, normal S1 S2, no S3 or S4, no murmur, click or rub, no peripheral edema and peripheral pulses strong  ABDOMEN: soft, nontender, no hepatosplenomegaly, no masses and bowel sounds normal   (female): normal urethral meatus , vaginal mucosa pink, moist, well rugated, normal cervix, adnexae, and uterus without masses, clitoris and labia minora absent.  MS: no gross musculoskeletal defects noted, no edema  PSYCH: mentation appears normal, affect normal/bright  LYMPH: no cervical, supraclavicular, axillary, or inguinal adenopathy    Diagnostic Test Results:  pending    ASSESSMENT/PLAN:   1. Routine history and physical examination of adult      2. Type 2 diabetes mellitus with chronic kidney disease, without long-term current use of insulin, unspecified CKD stage (H)    - HEMOGLOBIN A1C  - Albumin Random Urine Quantitative with Creat Ratio    3. Thyroid cancer (H)  Patient to follow-up with endocrinology next week.    4. Screening for HIV (human immunodeficiency virus)  Patient declines    5. Screening for diabetic peripheral neuropathy    - FOOT EXAM  NO CHARGE [53317.114]    6. Need for prophylactic vaccination and inoculation against influenza  Patient " "declines.    7. Need for prophylactic vaccination against Streptococcus pneumoniae (pneumococcus)  Patient declines.    8. Need for prophylactic vaccination with tetanus-diphtheria (Td)  Patient declines.    9. Cervical cancer screening    - Pap imaged thin layer screen with HPV - recommended age 30 - 65 years (select HPV order below)  - HPV High Risk Types DNA Cervical    10. Encounter for screening mammogram for breast cancer    - *MA Screening Digital Bilateral; Future    11. Iron deficiency anemia, unspecified iron deficiency anemia type  Recheck.  - Iron and iron binding capacity  - Ferritin  - CBC with platelets differential    12. Hyperlipidemia, unspecified hyperlipidemia type    - Lipid panel reflex to direct LDL Fasting    13. Postsurgical hypothyroidism  Stable.  Continue current treatment plan and medications.    - levothyroxine (SYNTHROID/LEVOTHROID) 150 MCG tablet; Take 1 tablet (150 mcg) by mouth daily  Dispense: 90 tablet; Refill: 3  - TSH with free T4 reflex    14. Benign essential hypertension  Stable.  Continue current treatment plan and medications.    - lisinopril (PRINIVIL/ZESTRIL) 40 MG tablet; Take 1 tablet (40 mg) by mouth daily  Dispense: 90 tablet; Refill: 0  - Basic metabolic panel    COUNSELING:   Reviewed preventive health counseling, as reflected in patient instructions       Regular exercise       Healthy diet/nutrition       Immunizations    Declined: Influenza, Pneumococcal and TDAP due to Conscientious objector            BP Readings from Last 1 Encounters:   11/15/18 (!) 146/98     Estimated body mass index is 26.63 kg/(m^2) as calculated from the following:    Height as of this encounter: 5' 5\" (1.651 m).    Weight as of this encounter: 160 lb (72.6 kg).      Weight management plan: Discussed healthy diet and exercise guidelines and patient will follow up in 12 months in clinic to re-evaluate.     reports that she has never smoked. She has never used smokeless " tobacco.      Counseling Resources:  ATP IV Guidelines  Pooled Cohorts Equation Calculator  Breast Cancer Risk Calculator  FRAX Risk Assessment  ICSI Preventive Guidelines  Dietary Guidelines for Americans, 2010  USDA's MyPlate  ASA Prophylaxis  Lung CA Screening    SUKHI Knapp CNP  [facility]

## 2018-11-19 LAB
COPATH REPORT: NORMAL
PAP: NORMAL

## 2018-11-19 RX ORDER — FERROUS SULFATE 325(65) MG
325 TABLET ORAL EVERY OTHER DAY
Qty: 30 TABLET | Refills: 2 | Status: SHIPPED | OUTPATIENT
Start: 2018-11-19 | End: 2019-07-19

## 2018-11-21 LAB
FINAL DIAGNOSIS: NORMAL
HPV HR 12 DNA CVX QL NAA+PROBE: NEGATIVE
HPV16 DNA SPEC QL NAA+PROBE: NEGATIVE
HPV18 DNA SPEC QL NAA+PROBE: NEGATIVE
SPECIMEN DESCRIPTION: NORMAL
SPECIMEN SOURCE CVX/VAG CYTO: NORMAL

## 2019-01-14 NOTE — PROGRESS NOTES
SUBJECTIVE:   Lay Solis is a 48 year old female who presents to clinic today for the following health issues:    Chief Complaint   Patient presents with     Hypertension     Anemia       Hypertension Follow-up      Outpatient blood pressures are not being checked.    Low Salt Diet: not monitoring salt      Amount of exercise or physical activity: 2-3 days/week for an average of 30 minutes    Problems taking medications regularly: No    Medication side effects: none    Diet: regular (no restrictions)    Patient notes difficulty falling asleep.  She notes she sleeps for a short time and then wakes up and can't get back to sleep.  She does not snore.  She notes chronic fatigue.  Patient does not nap.  Patient has tried over the counter medication for sleep without much improvement.      Problem list and histories reviewed & adjusted, as indicated.  Additional history: as documented    Patient Active Problem List   Diagnosis     Anemia     History of thyroid cancer     Hyperlipidemia     Hypertension, benign     Postsurgical hypothyroidism     Thyroid cancer (H)     Type 2 diabetes mellitus with renal complication (H)     Past Surgical History:   Procedure Laterality Date     ENT SURGERY      partial thyroidectomy     GI SURGERY      cesearean section       Social History     Tobacco Use     Smoking status: Never Smoker     Smokeless tobacco: Never Used   Substance Use Topics     Alcohol use: No     History reviewed. No pertinent family history.      Current Outpatient Medications   Medication Sig Dispense Refill     ferrous sulfate (IRON) 325 (65 Fe) MG tablet Take 1 tablet (325 mg) by mouth every other day 30 tablet 2     hydrochlorothiazide (HYDRODIURIL) 12.5 MG tablet Take 1 tablet (12.5 mg) by mouth daily 30 tablet 1     levothyroxine (SYNTHROID/LEVOTHROID) 150 MCG tablet Take 1 tablet (150 mcg) by mouth daily 90 tablet 3     lisinopril (PRINIVIL/ZESTRIL) 40 MG tablet Take 1 tablet (40 mg) by mouth daily 90  "tablet 0     STATIN NOT PRESCRIBED (INTENTIONAL) 1 each daily Please choose reason not prescribed, below       traZODone (DESYREL) 50 MG tablet Take 1 tablet (50 mg) by mouth At Bedtime 90 tablet 1     aspirin 81 MG EC tablet Take 81 mg by mouth daily       No Known Allergies  BP Readings from Last 3 Encounters:   01/16/19 138/90   11/15/18 (!) 146/98   09/11/18 132/84    Wt Readings from Last 3 Encounters:   01/16/19 73.4 kg (161 lb 12.8 oz)   11/15/18 72.6 kg (160 lb)   09/11/18 73.8 kg (162 lb 12.8 oz)                  Labs reviewed in EPIC    Reviewed and updated as needed this visit by clinical staff  Allergies  Meds       Reviewed and updated as needed this visit by Provider         ROS:  Constitutional, HEENT, cardiovascular, pulmonary, gi and gu systems are negative, except as otherwise noted.    OBJECTIVE:     /90   Pulse 80   Temp 96.7  F (35.9  C) (Oral)   Resp 16   Ht 1.651 m (5' 5\")   Wt 73.4 kg (161 lb 12.8 oz)   SpO2 98%   BMI 26.92 kg/m    Body mass index is 26.92 kg/m .  GENERAL: healthy, alert and no distress  RESP: lungs clear to auscultation - no rales, rhonchi or wheezes  CV: regular rate and rhythm, normal S1 S2, no S3 or S4, no murmur, click or rub, no peripheral edema and peripheral pulses strong  MS: no gross musculoskeletal defects noted, no edema    Diagnostic Test Results:  pending    ASSESSMENT/PLAN:     1. Iron deficiency anemia, unspecified iron deficiency anemia type  Recheck today.  - Hemoglobin  - Ferritin  - Iron and iron binding capacity    2. Hypertension, benign  Blood pressure uncontrolled.  Add hydrochlorothiazide .  - hydrochlorothiazide (HYDRODIURIL) 12.5 MG tablet; Take 1 tablet (12.5 mg) by mouth daily  Dispense: 30 tablet; Refill: 1    3. Type 2 diabetes mellitus with chronic kidney disease, without long-term current use of insulin, unspecified CKD stage (H)  Patient declines statin.  - STATIN NOT PRESCRIBED (INTENTIONAL); 1 each daily Please choose reason " not prescribed, below    4. Sleep disturbance    - traZODone (DESYREL) 50 MG tablet; Take 1 tablet (50 mg) by mouth At Bedtime  Dispense: 90 tablet; Refill: 1    FUTURE APPOINTMENTS:       - Follow-up visit in 2 weeks.    SUKHI Knapp [facility]

## 2019-01-16 ENCOUNTER — OFFICE VISIT (OUTPATIENT)
Dept: FAMILY MEDICINE | Facility: CLINIC | Age: 49
End: 2019-01-16
Payer: COMMERCIAL

## 2019-01-16 ENCOUNTER — OFFICE VISIT (OUTPATIENT)
Dept: OPTOMETRY | Facility: CLINIC | Age: 49
End: 2019-01-16
Payer: COMMERCIAL

## 2019-01-16 VITALS
HEIGHT: 65 IN | HEART RATE: 80 BPM | RESPIRATION RATE: 16 BRPM | SYSTOLIC BLOOD PRESSURE: 138 MMHG | WEIGHT: 161.8 LBS | DIASTOLIC BLOOD PRESSURE: 90 MMHG | TEMPERATURE: 96.7 F | BODY MASS INDEX: 26.96 KG/M2 | OXYGEN SATURATION: 98 %

## 2019-01-16 DIAGNOSIS — I10 HYPERTENSION, BENIGN: ICD-10-CM

## 2019-01-16 DIAGNOSIS — H52.12 MYOPIA, LEFT: ICD-10-CM

## 2019-01-16 DIAGNOSIS — E11.22 TYPE 2 DIABETES MELLITUS WITH CHRONIC KIDNEY DISEASE, WITHOUT LONG-TERM CURRENT USE OF INSULIN, UNSPECIFIED CKD STAGE (H): ICD-10-CM

## 2019-01-16 DIAGNOSIS — D50.9 IRON DEFICIENCY ANEMIA, UNSPECIFIED IRON DEFICIENCY ANEMIA TYPE: Primary | ICD-10-CM

## 2019-01-16 DIAGNOSIS — G47.9 SLEEP DISTURBANCE: ICD-10-CM

## 2019-01-16 DIAGNOSIS — E11.9 TYPE 2 DIABETES MELLITUS WITHOUT RETINOPATHY (H): ICD-10-CM

## 2019-01-16 DIAGNOSIS — H52.4 PRESBYOPIA: ICD-10-CM

## 2019-01-16 DIAGNOSIS — Z01.01 ENCOUNTER FOR EXAMINATION OF EYES AND VISION WITH ABNORMAL FINDINGS: Primary | ICD-10-CM

## 2019-01-16 LAB
FERRITIN SERPL-MCNC: 10 NG/ML (ref 8–252)
HGB BLD-MCNC: 9.6 G/DL (ref 11.7–15.7)
IRON SATN MFR SERPL: 6 % (ref 15–46)
IRON SERPL-MCNC: 19 UG/DL (ref 35–180)
TIBC SERPL-MCNC: 334 UG/DL (ref 240–430)

## 2019-01-16 PROCEDURE — 99214 OFFICE O/P EST MOD 30 MIN: CPT | Performed by: NURSE PRACTITIONER

## 2019-01-16 PROCEDURE — 83540 ASSAY OF IRON: CPT | Performed by: NURSE PRACTITIONER

## 2019-01-16 PROCEDURE — 92015 DETERMINE REFRACTIVE STATE: CPT | Performed by: OPTOMETRIST

## 2019-01-16 PROCEDURE — 83550 IRON BINDING TEST: CPT | Performed by: NURSE PRACTITIONER

## 2019-01-16 PROCEDURE — 36415 COLL VENOUS BLD VENIPUNCTURE: CPT | Performed by: NURSE PRACTITIONER

## 2019-01-16 PROCEDURE — 82728 ASSAY OF FERRITIN: CPT | Performed by: NURSE PRACTITIONER

## 2019-01-16 PROCEDURE — 85018 HEMOGLOBIN: CPT | Performed by: NURSE PRACTITIONER

## 2019-01-16 PROCEDURE — 92004 COMPRE OPH EXAM NEW PT 1/>: CPT | Performed by: OPTOMETRIST

## 2019-01-16 RX ORDER — HYDROCHLOROTHIAZIDE 12.5 MG/1
12.5 TABLET ORAL DAILY
Qty: 30 TABLET | Refills: 1 | Status: SHIPPED | OUTPATIENT
Start: 2019-01-16 | End: 2019-04-18

## 2019-01-16 RX ORDER — TRAZODONE HYDROCHLORIDE 50 MG/1
50 TABLET, FILM COATED ORAL AT BEDTIME
Qty: 90 TABLET | Refills: 1 | Status: SHIPPED | OUTPATIENT
Start: 2019-01-16 | End: 2019-04-18

## 2019-01-16 ASSESSMENT — EXTERNAL EXAM - RIGHT EYE: OD_EXAM: NORMAL

## 2019-01-16 ASSESSMENT — REFRACTION_MANIFEST
OD_ADD: +2.00
OS_CYLINDER: SPHERE
OD_CYLINDER: SPHERE
OD_SPHERE: PLANO
OS_SPHERE: -0.50
OS_ADD: +2.00

## 2019-01-16 ASSESSMENT — TONOMETRY
OD_IOP_MMHG: 17
OS_IOP_MMHG: 16
IOP_METHOD: APPLANATION

## 2019-01-16 ASSESSMENT — CUP TO DISC RATIO
OS_RATIO: 0.3
OD_RATIO: 0.3

## 2019-01-16 ASSESSMENT — VISUAL ACUITY
METHOD: SNELLEN - LINEAR
OS_SC: 20/120
OD_SC: 20/20
OD_SC+: -1
OD_SC: 20/120
OS_SC: 20/25

## 2019-01-16 ASSESSMENT — SLIT LAMP EXAM - LIDS
COMMENTS: NORMAL
COMMENTS: NORMAL

## 2019-01-16 ASSESSMENT — EXTERNAL EXAM - LEFT EYE: OS_EXAM: NORMAL

## 2019-01-16 ASSESSMENT — CONF VISUAL FIELD
OS_NORMAL: 1
OD_NORMAL: 1

## 2019-01-16 ASSESSMENT — MIFFLIN-ST. JEOR: SCORE: 1364.8

## 2019-01-16 ASSESSMENT — PAIN SCALES - GENERAL: PAINLEVEL: NO PAIN (0)

## 2019-01-16 NOTE — PATIENT INSTRUCTIONS
[facility]    If you have any questions regarding to your visit please contact your care team:     Team Pink:   Clinic Hours Telephone Number   Internal Medicine:  Dr. Angelica Devine NP 7am-7pm  Monday - Thursday   7am-5pm  Fridays  [phone]  (Appointment scheduling available 24/7)   Urgent Care - [place] [facility] - 11am-9pm Monday-Friday Saturday-Sunday- 9am-5pm   [place] - 5pm-9pm Monday-Friday Saturday-Sunday- 9am-5pm  [phone] - [place]  [phone] - [place]       What options do I have for a visit other than an office visit? We offer electronic visits (e-visits) and telephone visits, when medically appropriate.  Please check with your medical insurance to see if these types of visits are covered, as you will be responsible for any charges that are not paid by your insurance.      You can use [employer] (secure electronic communication) to access to your chart, send your primary care provider a message, or make an appointment. Ask a team member how to get started.     For a price quote for your services, please call our Consumer Price Line at [phone] or our Imaging Cost estimation line at [phone] (for imaging tests).    Meghan Sow, [facility]

## 2019-01-16 NOTE — RESULT ENCOUNTER NOTE
Dear Lay,    Your recent test results are attached.      Improving anemia.  Please continue iron supplement every other day.  We'll discuss recheck again at your follow-up appointment in 2 weeks.    If you have any questions please feel free to contact [phone] or myself via [employer].    Sincerely,  Sisi Devine, CNP

## 2019-01-16 NOTE — PROGRESS NOTES
Chief Complaint   Patient presents with     Diabetic Eye Exam     Accompanied by self  Lab Results   Component Value Date    A1C 6.1 11/15/2018       Last Eye Exam: 1 year ago  Dilated Previously: Yes    What are you currently using to see?  Glasses for reading-patient lost her glasses    Distance Vision Acuity: Noticed gradual change in both eyes    Near Vision Acuity: Not satisfied     Eye Comfort: good  Do you use eye drops? : No  Occupation or Hobbies: childcare    Velma Rodrigues, [email or web address]     Medical, surgical and family histories reviewed and updated 1/16/2019.       OBJECTIVE: See Ophthalmology exam    ASSESSMENT:    ICD-10-CM    1. Encounter for examination of eyes and vision with abnormal findings Z01.01    2. Type 2 diabetes mellitus without retinopathy (H) E11.9    3. Myopia, left H52.12    4. Presbyopia H52.4       PLAN:    Lay Solis aware  eye exam results will be sent to No Ref-Primary, Physician.  Patient Instructions   Dilation deferred today. Patient will return for dilated portion of exam within 1 month.     Patient educated on importance of good blood sugar control.  Letter sent to primary care provider with diabetic eye exam report.     Undilated ocular health within normal limits.   Glasses Rx provided today.  Return ~1 month for dilation.       Grayson Wagoner O.D.  [facility] [place]  [address] [place] [address]. [place]  [id number]    [phone]

## 2019-01-16 NOTE — LETTER
[facility]  [address] [place] [address]    January 17, 2019    Lay Solis  [address] [facility] [address]      Dear Lay,    Improving anemia.  Please continue iron supplement every other day.  We'll discuss recheck again at your follow-up appointment in 2 weeks.     If you have any questions please feel free to contact [phone] or myself via [employer]    Enclosed is a copy of your results.     Results for orders placed or performed in visit on 01/16/19   Hemoglobin   Result Value Ref Range    Hemoglobin 9.6 (L) 11.7 - 15.7 g/dL   Ferritin   Result Value Ref Range    Ferritin 10 8 - 252 ng/mL   Iron and iron binding capacity   Result Value Ref Range    Iron 19 (L) 35 - 180 ug/dL    Iron Binding Cap 334 240 - 430 ug/dL    Iron Saturation Index 6 (L) 15 - 46 %       If you have any questions or concerns, please call myself or my nurse at [phone].      Sincerely,        Sisi Devine CNP/michael

## 2019-01-16 NOTE — LETTER
1/16/2019         RE: Lay Solis  [address]  [place] [address]        Dear Colleague,    Thank you for referring your patient, Lay Solis, to the [facility]. Please see a copy of my visit note below.    Chief Complaint   Patient presents with     Diabetic Eye Exam     Accompanied by self  Lab Results   Component Value Date    A1C 6.1 11/15/2018       Last Eye Exam: 1 year ago  Dilated Previously: Yes    What are you currently using to see?  Glasses for reading-patient lost her glasses    Distance Vision Acuity: Noticed gradual change in both eyes    Near Vision Acuity: Not satisfied     Eye Comfort: good  Do you use eye drops? : No  Occupation or Hobbies: childcare    Velma Rodrigues, Optometric Tech     Medical, surgical and family histories reviewed and updated 1/16/2019.       OBJECTIVE: See Ophthalmology exam    ASSESSMENT:    ICD-10-CM    1. Encounter for examination of eyes and vision with abnormal findings Z01.01    2. Type 2 diabetes mellitus without retinopathy (H) E11.9    3. Myopia, left H52.12    4. Presbyopia H52.4       PLAN:    Lay Solis aware  eye exam results will be sent to No Ref-Primary, Physician.  Patient Instructions   Dilation deferred today. Patient will return for dilated portion of exam within 1 month.     Patient educated on importance of good blood sugar control.  Letter sent to primary care provider with diabetic eye exam report.     Undilated ocular health within normal limits.   Glasses Rx provided today.  Return ~1 month for dilation.       Grayson Wagoner O.D.  [address] [place]  [address]    [phone]              Again, thank you for allowing me to participate in the care of your patient.        Sincerely,        Grayson Wagoner, OD

## 2019-01-16 NOTE — PATIENT INSTRUCTIONS
Dilation deferred today. Patient will return for dilated portion of exam within 1 month.     Patient educated on importance of good blood sugar control.  Letter sent to primary care provider with diabetic eye exam report.     Undilated ocular health within normal limits.   Glasses Rx provided today.  Return ~1 month for dilation.       Grayson Wagoner O.D.  [address] [place]  [address]    [phone]     
detailed exam

## 2019-02-19 DIAGNOSIS — I10 BENIGN ESSENTIAL HYPERTENSION: ICD-10-CM

## 2019-02-20 RX ORDER — LISINOPRIL 40 MG/1
TABLET ORAL
Qty: 90 TABLET | Refills: 0 | Status: SHIPPED | OUTPATIENT
Start: 2019-02-20 | End: 2019-04-18

## 2019-04-11 ENCOUNTER — TELEPHONE (OUTPATIENT)
Dept: FAMILY MEDICINE | Facility: CLINIC | Age: 49
End: 2019-04-11

## 2019-04-11 NOTE — LETTER
April 11, 2019          Lay Solis,  [address] [place] [id number]        Dear Lay Solis      Monitoring and managing your preventative and chronic health conditions are very important to us. Our records indicate that you have not scheduled for Appointment with your provider, Diabetic Check and HgbA1C  which was recommended by Sisi Devine CNP.      If you have received your health care elsewhere, please call the clinic so the information can be documented in your chart.    Please call [phone] or message us through your [employer] account to schedule an appointment or provide information for your chart.     Feel free to contact us if you have any questions or concerns!    I look forward to seeing you and working with you on your health care needs.     Sincerely,       Your [facility] Team/kalyani

## 2019-04-11 NOTE — TELEPHONE ENCOUNTER
Panel Management Review      Patient has the following on her problem list:     Diabetes    ASA: Passed    Last A1C  Lab Results   Component Value Date    A1C 6.1 11/15/2018     A1C tested: FAILED    Last LDL:    Lab Results   Component Value Date    CHOL 169 11/15/2018     Lab Results   Component Value Date    HDL 51 11/15/2018     Lab Results   Component Value Date    LDL 98 11/15/2018     Lab Results   Component Value Date    TRIG 102 11/15/2018     No results found for: CHOLHDLRATIO  Lab Results   Component Value Date    NHDL 118 11/15/2018       Is the patient on a Statin? YES             Is the patient on Aspirin? YES    Medications     HMG CoA Reductase Inhibitors     STATIN NOT PRESCRIBED (INTENTIONAL)       Salicylates     aspirin 81 MG EC tablet             Last three blood pressure readings:  BP Readings from Last 3 Encounters:   01/16/19 138/90   11/15/18 (!) 146/98   09/11/18 132/84       Date of last diabetes office visit: 1/16/19     Tobacco History:     History   Smoking Status     Never Smoker   Smokeless Tobacco     Never Used           Composite cancer screening  Chart review shows that this patient is due/due soon for the following None  Summary:    Patient is due/failing the following:   A1C    Action needed:   Patient needs office visit for diabetes management.    Type of outreach:    Sent letter.    Questions for provider review:    None                                                                                                                                    Dorys MENDES CMA       Chart routed to none .

## 2019-04-18 ENCOUNTER — OFFICE VISIT (OUTPATIENT)
Dept: FAMILY MEDICINE | Facility: CLINIC | Age: 49
End: 2019-04-18
Payer: COMMERCIAL

## 2019-04-18 ENCOUNTER — TELEPHONE (OUTPATIENT)
Dept: FAMILY MEDICINE | Facility: CLINIC | Age: 49
End: 2019-04-18

## 2019-04-18 VITALS
DIASTOLIC BLOOD PRESSURE: 94 MMHG | SYSTOLIC BLOOD PRESSURE: 146 MMHG | HEIGHT: 65 IN | TEMPERATURE: 97.1 F | OXYGEN SATURATION: 100 % | BODY MASS INDEX: 26.99 KG/M2 | HEART RATE: 98 BPM | WEIGHT: 162 LBS | RESPIRATION RATE: 20 BRPM

## 2019-04-18 DIAGNOSIS — R80.9 TYPE 2 DIABETES MELLITUS WITH MICROALBUMINURIA, WITHOUT LONG-TERM CURRENT USE OF INSULIN (H): ICD-10-CM

## 2019-04-18 DIAGNOSIS — R80.9 TYPE 2 DIABETES MELLITUS WITH MICROALBUMINURIA, WITHOUT LONG-TERM CURRENT USE OF INSULIN (H): Primary | ICD-10-CM

## 2019-04-18 DIAGNOSIS — G47.9 SLEEP DISTURBANCE: ICD-10-CM

## 2019-04-18 DIAGNOSIS — D50.9 IRON DEFICIENCY ANEMIA, UNSPECIFIED IRON DEFICIENCY ANEMIA TYPE: ICD-10-CM

## 2019-04-18 DIAGNOSIS — C73 THYROID CANCER (H): ICD-10-CM

## 2019-04-18 DIAGNOSIS — I10 BENIGN ESSENTIAL HYPERTENSION: ICD-10-CM

## 2019-04-18 DIAGNOSIS — E11.29 TYPE 2 DIABETES MELLITUS WITH MICROALBUMINURIA, WITHOUT LONG-TERM CURRENT USE OF INSULIN (H): Primary | ICD-10-CM

## 2019-04-18 DIAGNOSIS — E11.29 TYPE 2 DIABETES MELLITUS WITH MICROALBUMINURIA, WITHOUT LONG-TERM CURRENT USE OF INSULIN (H): ICD-10-CM

## 2019-04-18 DIAGNOSIS — Z23 NEED FOR PROPHYLACTIC VACCINATION WITH TETANUS-DIPHTHERIA (TD): ICD-10-CM

## 2019-04-18 LAB
ANION GAP SERPL CALCULATED.3IONS-SCNC: 7 MMOL/L (ref 3–14)
BUN SERPL-MCNC: 10 MG/DL (ref 7–30)
CALCIUM SERPL-MCNC: 9 MG/DL (ref 8.5–10.1)
CHLORIDE SERPL-SCNC: 103 MMOL/L (ref 94–109)
CO2 SERPL-SCNC: 26 MMOL/L (ref 20–32)
CREAT SERPL-MCNC: 0.67 MG/DL (ref 0.52–1.04)
FERRITIN SERPL-MCNC: 3 NG/ML (ref 8–252)
GFR SERPL CREATININE-BSD FRML MDRD: >90 ML/MIN/{1.73_M2}
GLUCOSE SERPL-MCNC: 106 MG/DL (ref 70–99)
HBA1C MFR BLD: 6.2 % (ref 0–5.6)
HGB BLD-MCNC: 9.3 G/DL (ref 11.7–15.7)
IRON SATN MFR SERPL: 5 % (ref 15–46)
IRON SERPL-MCNC: 20 UG/DL (ref 35–180)
POTASSIUM SERPL-SCNC: 3.4 MMOL/L (ref 3.4–5.3)
SODIUM SERPL-SCNC: 136 MMOL/L (ref 133–144)
TIBC SERPL-MCNC: 391 UG/DL (ref 240–430)

## 2019-04-18 PROCEDURE — 80048 BASIC METABOLIC PNL TOTAL CA: CPT | Performed by: NURSE PRACTITIONER

## 2019-04-18 PROCEDURE — 83540 ASSAY OF IRON: CPT | Performed by: NURSE PRACTITIONER

## 2019-04-18 PROCEDURE — 99214 OFFICE O/P EST MOD 30 MIN: CPT | Mod: 25 | Performed by: NURSE PRACTITIONER

## 2019-04-18 PROCEDURE — 83550 IRON BINDING TEST: CPT | Performed by: NURSE PRACTITIONER

## 2019-04-18 PROCEDURE — 90715 TDAP VACCINE 7 YRS/> IM: CPT | Performed by: NURSE PRACTITIONER

## 2019-04-18 PROCEDURE — 90471 IMMUNIZATION ADMIN: CPT | Performed by: NURSE PRACTITIONER

## 2019-04-18 PROCEDURE — 83036 HEMOGLOBIN GLYCOSYLATED A1C: CPT | Performed by: NURSE PRACTITIONER

## 2019-04-18 PROCEDURE — 36415 COLL VENOUS BLD VENIPUNCTURE: CPT | Performed by: NURSE PRACTITIONER

## 2019-04-18 PROCEDURE — 82728 ASSAY OF FERRITIN: CPT | Performed by: NURSE PRACTITIONER

## 2019-04-18 PROCEDURE — 85018 HEMOGLOBIN: CPT | Performed by: NURSE PRACTITIONER

## 2019-04-18 RX ORDER — LANCETS
EACH MISCELLANEOUS
Qty: 100 EACH | Refills: 3 | Status: SHIPPED | OUTPATIENT
Start: 2019-04-18 | End: 2020-09-23

## 2019-04-18 RX ORDER — LISINOPRIL 40 MG/1
40 TABLET ORAL DAILY
Qty: 90 TABLET | Refills: 1 | Status: SHIPPED | OUTPATIENT
Start: 2019-04-18 | End: 2020-01-29

## 2019-04-18 RX ORDER — TRAZODONE HYDROCHLORIDE 50 MG/1
50 TABLET, FILM COATED ORAL AT BEDTIME
Qty: 90 TABLET | Refills: 1 | Status: SHIPPED | OUTPATIENT
Start: 2019-04-18 | End: 2019-09-05

## 2019-04-18 RX ORDER — ASPIRIN 81 MG/1
81 TABLET ORAL DAILY
Qty: 90 TABLET | Refills: 3 | Status: SHIPPED | OUTPATIENT
Start: 2019-04-18 | End: 2020-11-04

## 2019-04-18 RX ORDER — HYDROCHLOROTHIAZIDE 25 MG/1
25 TABLET ORAL DAILY
Qty: 90 TABLET | Refills: 0 | Status: SHIPPED | OUTPATIENT
Start: 2019-04-18 | End: 2019-07-20

## 2019-04-18 ASSESSMENT — MIFFLIN-ST. JEOR: SCORE: 1365.71

## 2019-04-18 ASSESSMENT — PAIN SCALES - GENERAL: PAINLEVEL: NO PAIN (0)

## 2019-04-18 NOTE — TELEPHONE ENCOUNTER
Reason for Call:  Other prescription    Detailed comments: Pharmacy calling states they received prescriptions for glucose test strips and lancets, but need to know what brand of meter she has? Please advise    Phone Number Patient can be reached at: Other phone number:    [id number] - [place] - [address] [place] [address] (Pharmacy) [phone]     Best Time: ASAP    Can we leave a detailed message on this number? YES    Call taken on 4/18/2019 at 2:50 PM by Marylou Bueno

## 2019-04-18 NOTE — PROGRESS NOTES
SUBJECTIVE:   Lay Solis is a 48 year old female who presents to clinic today for the following   health issues:      Diabetes Follow-up      Patient is checking blood sugars: not at all    Diabetic concerns: None     Symptoms of hypoglycemia (low blood sugar): none     Paresthesias (numbness or burning in feet) or sores: No     Date of last diabetic eye exam: 3 months ago    Diabetes Management Resources    Hyperlipidemia Follow-Up      Rate your low fat/cholesterol diet?: good    Taking statin?  Yes, no muscle aches from statin    Other lipid medications/supplements?:  none    Hypertension Follow-up      Outpatient blood pressures are not being checked.    Low Salt Diet: not monitoring salt    BP Readings from Last 2 Encounters:   01/16/19 138/90   11/15/18 (!) 146/98     Hemoglobin A1C (%)   Date Value   11/15/2018 6.1 (H)     LDL Cholesterol Calculated (mg/dL)   Date Value   11/15/2018 98       Amount of exercise or physical activity: 2-3 days/week for an average of 15-30 minutes    Problems taking medications regularly: No    Medication side effects: none    Diet: regular (no restrictions)        Additional history: as documented    Reviewed  and updated as needed this visit by clinical staff         Reviewed and updated as needed this visit by Provider         Patient Active Problem List   Diagnosis     Anemia     History of thyroid cancer     Hyperlipidemia     Hypertension, benign     Postsurgical hypothyroidism     Thyroid cancer (H)     Type 2 diabetes mellitus with renal complication (H)     Past Surgical History:   Procedure Laterality Date     ENT SURGERY      partial thyroidectomy     GI SURGERY      cesearean section       Social History     Tobacco Use     Smoking status: Never Smoker     Smokeless tobacco: Never Used   Substance Use Topics     Alcohol use: No     Family History   Problem Relation Age of Onset     Glaucoma No family hx of      Macular Degeneration No family hx of          Current  "Outpatient Medications   Medication Sig Dispense Refill     aspirin 81 MG EC tablet Take 1 tablet (81 mg) by mouth daily 90 tablet 3     blood glucose (NO BRAND SPECIFIED) test strip Use to test blood sugar one times daily or as directed. To accompany: Blood Glucose Monitor Brands: per insurance. 100 strip 6     ferrous sulfate (IRON) 325 (65 Fe) MG tablet Take 1 tablet (325 mg) by mouth every other day 30 tablet 2     hydrochlorothiazide (HYDRODIURIL) 25 MG tablet Take 1 tablet (25 mg) by mouth daily 90 tablet 0     lisinopril (PRINIVIL/ZESTRIL) 40 MG tablet Take 1 tablet (40 mg) by mouth daily 90 tablet 1     STATIN NOT PRESCRIBED (INTENTIONAL) 1 each daily Please choose reason not prescribed, below       thin (NO BRAND SPECIFIED) lancets Use to test blood sugar one times daily or as directed. To accompany: Blood Glucose Monitor Brands: per insurance. 100 each 3     traZODone (DESYREL) 50 MG tablet Take 1 tablet (50 mg) by mouth At Bedtime 90 tablet 1     levothyroxine (SYNTHROID/LEVOTHROID) 150 MCG tablet Take 1 tablet (150 mcg) by mouth daily 90 tablet 3     No Known Allergies  BP Readings from Last 3 Encounters:   04/18/19 (!) 146/94   01/16/19 138/90   11/15/18 (!) 146/98    Wt Readings from Last 3 Encounters:   04/18/19 73.5 kg (162 lb)   01/16/19 73.4 kg (161 lb 12.8 oz)   11/15/18 72.6 kg (160 lb)                  Labs reviewed in EPIC    ROS:  Constitutional, HEENT, cardiovascular, pulmonary, gi and gu systems are negative, except as otherwise noted.    OBJECTIVE:     BP (!) 146/94   Pulse 98   Temp 97.1  F (36.2  C) (Oral)   Resp 20   Ht 1.651 m (5' 5\")   Wt 73.5 kg (162 lb)   SpO2 100%   BMI 26.96 kg/m    Body mass index is 26.96 kg/m .  GENERAL: healthy, alert and no distress  RESP: lungs clear to auscultation - no rales, rhonchi or wheezes  CV: regular rate and rhythm, normal S1 S2, no S3 or S4, no murmur, click or rub, no peripheral edema and peripheral pulses strong  MS: no gross " musculoskeletal defects noted, no edema    Diagnostic Test Results:  Results for orders placed or performed in visit on 04/18/19 (from the past 24 hour(s))   HEMOGLOBIN A1C   Result Value Ref Range    Hemoglobin A1C 6.2 (H) 0 - 5.6 %   Hemoglobin   Result Value Ref Range    Hemoglobin 9.3 (L) 11.7 - 15.7 g/dL       ASSESSMENT/PLAN:     1. Type 2 diabetes mellitus with microalbuminuria, without long-term current use of insulin (H)  Stable.  Continue current treatment plan and medications.    - HEMOGLOBIN A1C  - aspirin 81 MG EC tablet; Take 1 tablet (81 mg) by mouth daily  Dispense: 90 tablet; Refill: 3  - blood glucose (NO BRAND SPECIFIED) test strip; Use to test blood sugar one times daily or as directed. To accompany: Blood Glucose Monitor Brands: per insurance.  Dispense: 100 strip; Refill: 6  - thin (NO BRAND SPECIFIED) lancets; Use to test blood sugar one times daily or as directed. To accompany: Blood Glucose Monitor Brands: per insurance.  Dispense: 100 each; Refill: 3    2. Thyroid cancer (H)  Patient has endocrinology follow-up next week.    3. Need for prophylactic vaccination with tetanus-diphtheria (Td)    - TDAP, IM (10 - 64 YRS) - Adacel    4. Benign essential hypertension  Increase hydrochlorothiazide.  Blood pressure not at goal.    - lisinopril (PRINIVIL/ZESTRIL) 40 MG tablet; Take 1 tablet (40 mg) by mouth daily  Dispense: 90 tablet; Refill: 1  - Basic metabolic panel  - hydrochlorothiazide (HYDRODIURIL) 25 MG tablet; Take 1 tablet (25 mg) by mouth daily  Dispense: 90 tablet; Refill: 0      5. Sleep disturbance  Stable.  Continue current treatment plan and medications.    - traZODone (DESYREL) 50 MG tablet; Take 1 tablet (50 mg) by mouth At Bedtime  Dispense: 90 tablet; Refill: 1    6. Iron deficiency anemia, unspecified iron deficiency anemia type  Recheck  - Hemoglobin  - Iron and iron binding capacity  - Ferritin    FUTURE APPOINTMENTS:       - Follow-up visit in 2 weeks    Sisi Devine  APRN CNP  [facility] [place]

## 2019-04-18 NOTE — TELEPHONE ENCOUNTER
Per Sisi Devine NP's note, patient was not checking BG  Prescriptions for test strips and lancets were sent but not the glucometer    Prescription sent for glucometer    Daquan Green RN

## 2019-04-22 ENCOUNTER — TELEPHONE (OUTPATIENT)
Dept: INTERNAL MEDICINE | Facility: CLINIC | Age: 49
End: 2019-04-22

## 2019-04-22 DIAGNOSIS — D50.9 ANEMIA, IRON DEFICIENCY: Primary | ICD-10-CM

## 2019-04-22 NOTE — TELEPHONE ENCOUNTER
Patient notified of providers message as written.  Patient reports that she does miss doses often and is not taking the Ferrous sulfate every other day. She does have a bottle at home and will take this more consistently now.   Hailee Enriquez RN

## 2019-04-22 NOTE — TELEPHONE ENCOUNTER
Left message on voicemail for patient to return call to RN hotline at # [phone].    Daquan Green RN    Notes recorded by Sisi Devine APRN CNP on 4/22/2019 at 8:37 AM CDT  Please call patient-    Her labs show continued anemia, low iron.  Is she taking her ferrous sulfate consistently every other day?    Thanks,  Sisi Devine, CNP

## 2019-04-23 NOTE — TELEPHONE ENCOUNTER
Please encourage her to take ferrous sulfate every other day.  I would like to recheck a Hgb, ferritin, and iron and iron binding capacity in 1 month (indication iron deficiency anemia).    Thanks,  Siis Devine, CNP

## 2019-04-23 NOTE — TELEPHONE ENCOUNTER
Called and spoke with patient & notified of provider's information as written.   Lab orders placed. Patient states she will call back to schedule lab appointment.  Verbalizes understanding & no further questions.     Leda Goode RN

## 2019-07-11 ENCOUNTER — TELEPHONE (OUTPATIENT)
Dept: FAMILY MEDICINE | Facility: CLINIC | Age: 49
End: 2019-07-11

## 2019-07-11 NOTE — TELEPHONE ENCOUNTER
Panel Management Review      Patient has the following on her problem list:     Diabetes    ASA: Passed    Last A1C  Lab Results   Component Value Date    A1C 6.2 04/18/2019    A1C 6.1 11/15/2018     A1C tested: Passed    Last LDL:    Lab Results   Component Value Date    CHOL 169 11/15/2018     Lab Results   Component Value Date    HDL 51 11/15/2018     Lab Results   Component Value Date    LDL 98 11/15/2018     Lab Results   Component Value Date    TRIG 102 11/15/2018     No results found for: CHOLHDLRATIO  Lab Results   Component Value Date    NHDL 118 11/15/2018       Is the patient on a Statin? YES             Is the patient on Aspirin? YES    Medications     HMG CoA Reductase Inhibitors     STATIN NOT PRESCRIBED (INTENTIONAL)       Salicylates     aspirin 81 MG EC tablet             Last three blood pressure readings:  BP Readings from Last 3 Encounters:   04/18/19 (!) 146/94   01/16/19 138/90   11/15/18 (!) 146/98       Date of last diabetes office visit: 04/18/19     Tobacco History:     History   Smoking Status     Never Smoker   Smokeless Tobacco     Never Used         Hypertension   Last three blood pressure readings:  BP Readings from Last 3 Encounters:   04/18/19 (!) 146/94   01/16/19 138/90   11/15/18 (!) 146/98     Blood pressure: FAILED    HTN Guidelines:  Less than 140/90      Composite cancer screening  Chart review shows that this patient is due/due soon for the following None  Summary:    Patient is due/failing the following:   BP CHECK    Action needed:   Patient needs office visit for Blood pressure check.    Type of outreach:    Sent letter.    Questions for provider review:    None                                                                                                                                    Faye Chambers MA       Chart routed to none .

## 2019-07-11 NOTE — LETTER
July 11, 2019          Lay Solis,  [address] [place] [address]        Dear Lay Solis      Monitoring and managing your preventative and chronic health conditions are very important to us. Our records indicate that you have not scheduled for Appointment with your provider recheck on your blood pressure  which was recommended by Sisi Devine NP.      If you have received your health care elsewhere, please call the clinic so the information can be documented in your chart.    Please call [phone] or message us through your [employer] account to schedule an appointment or provide information for your chart.     Feel free to contact us if you have any questions or concerns!    I look forward to seeing you and working with you on your health care needs.     Sincerely,       Your [place] Care Team/sg

## 2019-07-19 DIAGNOSIS — D50.9 IRON DEFICIENCY ANEMIA, UNSPECIFIED IRON DEFICIENCY ANEMIA TYPE: ICD-10-CM

## 2019-07-19 RX ORDER — FERROUS SULFATE 325(65) MG
TABLET ORAL
Qty: 30 TABLET | Refills: 1 | Status: SHIPPED | OUTPATIENT
Start: 2019-07-19 | End: 2020-03-24

## 2019-07-19 NOTE — TELEPHONE ENCOUNTER
"Routing refill request to provider for review/approval because:  A break in medication    Requested Prescriptions   Pending Prescriptions Disp Refills     ferrous sulfate (FEROSUL) 325 (65 Fe) MG tablet [Pharmacy Med Name: Ferrous Sulfate Oral Tablet 325 (65 Fe) MG]  Last Written Prescription Date:  11/19/18  Last Fill Quantity: 30,  # refills: 2   Last office visit: 4/18/2019 with prescribing provider:     Future Office Visit:   30 tablet 1     Sig: TAKE 1 TABLET BY MOUTH ONCE EVERY OTHER DAY       Iron Supplements Passed - 7/19/2019  7:01 AM        Passed - Patient is 12 years of age or older        Passed - Recent (12 mo) or future (30 days) visit within the authorizing provider's specialty     Patient had office visit in the last 12 months or has a visit in the next 30 days with authorizing provider or within the authorizing provider's specialty.  See \"Patient Info\" tab in inbasket, or \"Choose Columns\" in Meds & Orders section of the refill encounter.              Passed - Hgb OR Hct on record within the past 12 mos.     Patient need only have had a HGB or HCT on file in the past 12 mos. That result does not need to be normal.    Recent Labs   Lab Test 04/18/19  0857 01/16/19  0951 11/15/18  0836   HGB 9.3* 9.6* 8.3*     Recent Labs   Lab Test 11/15/18  0836   HCT 28.2*       Please verify a HGB or HCT has been checked SINCE THE LAST DOSE CHANGE.            Passed - Medication is active on med list        Meka Byrd RN - BC      "

## 2019-07-20 DIAGNOSIS — I10 BENIGN ESSENTIAL HYPERTENSION: ICD-10-CM

## 2019-07-20 NOTE — LETTER
[facility]  [address] [place] [id number]  [phone]          July 31, 2019    Lay Solis                                                                                                                     [address] [place] [id number]        Dear Lay,    We have tried to reach you by phone, but were unable to do so.    We refilled your prescription for the hydrochlorothiazide (HYDRODIURIL) 25 MG tablet on July 23rd, 2019, for a 30 day supply.    You will need to be seen for an appointment before we can refill this again.  Please call [phone], to schedule an appointment at your earliest convenience.    If you are getting your medical care at another clinic, please let us and your pharmacy know so that we can update your file.    Sincerely,         Sisi Devine CNP/felicita

## 2019-07-22 NOTE — TELEPHONE ENCOUNTER
"Routing refill request to provider for review/approval because:  Elevated BP    Requested Prescriptions   Pending Prescriptions Disp Refills     hydrochlorothiazide (HYDRODIURIL) 25 MG tablet [Pharmacy Med Name: hydroCHLOROthiazide Oral Tablet 25 MG]  Last Written Prescription Date:  4/18/19  Last Fill Quantity: 90,  # refills: 0   Last office visit: 4/18/2019 with prescribing provider:     Future Office Visit:   90 tablet 0     Sig: TAKE ONE TABLET BY MOUTH ONE TIME DAILY       Diuretics (Including Combos) Protocol Failed - 7/22/2019  7:48 AM        Failed - Blood pressure under 140/90 in past 12 months     BP Readings from Last 3 Encounters:   04/18/19 (!) 146/94   01/16/19 138/90   11/15/18 (!) 146/98                 Passed - Recent (12 mo) or future (30 days) visit within the authorizing provider's specialty     Patient had office visit in the last 12 months or has a visit in the next 30 days with authorizing provider or within the authorizing provider's specialty.  See \"Patient Info\" tab in inbasket, or \"Choose Columns\" in Meds & Orders section of the refill encounter.              Passed - Medication is active on med list        Passed - Patient is age 18 or older        Passed - No active pregancy on record        Passed - Normal serum creatinine on file in past 12 months     Recent Labs   Lab Test 04/18/19  0857   CR 0.67              Passed - Normal serum potassium on file in past 12 months     Recent Labs   Lab Test 04/18/19  0857   POTASSIUM 3.4                    Passed - Normal serum sodium on file in past 12 months     Recent Labs   Lab Test 04/18/19  0857                 Passed - No positive pregnancy test in past 12 months        Meka Byrd RN - BC          "

## 2019-07-23 RX ORDER — HYDROCHLOROTHIAZIDE 25 MG/1
TABLET ORAL
Qty: 30 TABLET | Refills: 0 | Status: SHIPPED | OUTPATIENT
Start: 2019-07-23 | End: 2020-11-18

## 2019-07-23 NOTE — TELEPHONE ENCOUNTER
LM for patient informing her to schedule office visit for her BP with Sisi within the next 30 days.  Informed her prescription was sent for a 30 day supply. Stephanie Floyd,

## 2019-07-25 NOTE — TELEPHONE ENCOUNTER
2nd attempt. LM for patient informing her to schedule office visit for her BP with Sisi within the next 30 days.  Informed her prescription was sent for a 30 day supply.    Ruben Trivedi CMA on 7/25/2019 at 9:49 AM

## 2019-08-30 NOTE — PROGRESS NOTES
"Subjective     Lay Solis is a 49 year old female who presents to clinic today for the following health issues:    History of Present Illness        Diabetes:   She presents for follow up of diabetes.  She is checking home blood glucose two times daily. She checks blood glucose before and after meals.  Blood glucose is never over 200 and never under 70. When her blood glucose is low, the patient is asymptomatic for confusion, blurred vision, lethargy and reports not feeling dizzy, shaky, or weak.  She has no concerns regarding her diabetes at this time.  She is not experiencing numbness or burning in feet, excessive thirst, blurry vision, weight changes or redness, sores or blisters on feet. The patient has had a diabetic eye exam in the last 12 months. Eye exam performed on due in Jan 2020.    Diabetes Management Resources    Hyperlipidemia:  She presents for follow up of hyperlipidemia.  She is taking medication to lower cholesterol. She is not having myalgia or other side effects to statin medications.She is not reporting shortness of breath, increased sweating or nausea with activity, left-sided neck or arm pain, chest pain or pressure, or pain in calves when walking 1-2 blocks.    Hypertension: She presents for follow up of hypertension.  She does not check blood pressure  regularly outside of the clinic. Outside blood pressures have been over 140/90. She follows a low salt diet.     She eats 0-1 servings of fruits and vegetables daily.She consumes 1 sweetened beverage(s) daily.  She is taking medications regularly.       Review of Systems   ROS COMP: Constitutional, HEENT, cardiovascular, pulmonary, gi and gu systems are negative, except as otherwise noted.      Objective    BP (!) 150/90   Pulse 82   Temp 97  F (36.1  C) (Oral)   Resp 14   Ht 1.651 m (5' 5\")   Wt 74.8 kg (165 lb)   SpO2 100%   BMI 27.46 kg/m    Body mass index is 27.46 kg/m .  Physical Exam   GENERAL: healthy, alert and no " distress  NECK: no adenopathy, no asymmetry, masses, or scars and thyroid normal to palpation  RESP: lungs clear to auscultation - no rales, rhonchi or wheezes  CV: regular rate and rhythm, normal S1 S2, no S3 or S4, no murmur, click or rub, no peripheral edema and peripheral pulses strong  MS: no gross musculoskeletal defects noted, no edema  SKIN: no suspicious lesions or rashes  Diabetic foot exam: normal DP and PT pulses, no trophic changes or ulcerative lesions, normal sensory exam and normal monofilament exam    Diagnostic Test Results:  Labs reviewed in Epic        Assessment & Plan     1. Type 2 diabetes mellitus with stage 3 chronic kidney disease, without long-term current use of insulin (H)  - Hemoglobin A1c  - Albumin Random Urine Quantitative with Creat Ratio  - FOOT EXAM  - Lipid panel reflex to direct LDL Fasting  - Comprehensive metabolic panel (BMP + Alb, Alk Phos, ALT, AST, Total. Bili, TP)    2. HTN, goal below 140/90  - metoprolol succinate ER (TOPROL-XL) 25 MG 24 hr tablet; Take 1 tablet (25 mg) by mouth daily  Dispense: 30 tablet; Refill: 1     BP checks daily  Follow up on labs  Follow up in 2 - 4 weeks.  Patient amenable to this follow up plan.             No follow-ups on file.    Bubba Singleton PA-C  [facility]

## 2019-09-03 ENCOUNTER — OFFICE VISIT (OUTPATIENT)
Dept: FAMILY MEDICINE | Facility: CLINIC | Age: 49
End: 2019-09-03

## 2019-09-03 VITALS
BODY MASS INDEX: 27.49 KG/M2 | DIASTOLIC BLOOD PRESSURE: 90 MMHG | OXYGEN SATURATION: 100 % | HEART RATE: 82 BPM | WEIGHT: 165 LBS | RESPIRATION RATE: 14 BRPM | TEMPERATURE: 97 F | SYSTOLIC BLOOD PRESSURE: 150 MMHG | HEIGHT: 65 IN

## 2019-09-03 DIAGNOSIS — I10 HTN, GOAL BELOW 140/90: ICD-10-CM

## 2019-09-03 DIAGNOSIS — N18.30 TYPE 2 DIABETES MELLITUS WITH STAGE 3 CHRONIC KIDNEY DISEASE, WITHOUT LONG-TERM CURRENT USE OF INSULIN (H): Primary | ICD-10-CM

## 2019-09-03 DIAGNOSIS — E11.22 TYPE 2 DIABETES MELLITUS WITH STAGE 3 CHRONIC KIDNEY DISEASE, WITHOUT LONG-TERM CURRENT USE OF INSULIN (H): Primary | ICD-10-CM

## 2019-09-03 PROBLEM — E78.5 HYPERLIPIDEMIA: Status: RESOLVED | Noted: 2017-07-13 | Resolved: 2019-09-03

## 2019-09-03 LAB
CREAT UR-MCNC: 12 MG/DL
HBA1C MFR BLD: 6.2 % (ref 0–5.6)
MICROALBUMIN UR-MCNC: 23 MG/L
MICROALBUMIN/CREAT UR: 194.87 MG/G CR (ref 0–25)

## 2019-09-03 PROCEDURE — 83036 HEMOGLOBIN GLYCOSYLATED A1C: CPT | Performed by: PHYSICIAN ASSISTANT

## 2019-09-03 PROCEDURE — 80053 COMPREHEN METABOLIC PANEL: CPT | Performed by: PHYSICIAN ASSISTANT

## 2019-09-03 PROCEDURE — 36415 COLL VENOUS BLD VENIPUNCTURE: CPT | Performed by: PHYSICIAN ASSISTANT

## 2019-09-03 PROCEDURE — 99214 OFFICE O/P EST MOD 30 MIN: CPT | Performed by: PHYSICIAN ASSISTANT

## 2019-09-03 PROCEDURE — 99207 C FOOT EXAM  NO CHARGE: CPT | Performed by: PHYSICIAN ASSISTANT

## 2019-09-03 PROCEDURE — 82043 UR ALBUMIN QUANTITATIVE: CPT | Performed by: PHYSICIAN ASSISTANT

## 2019-09-03 PROCEDURE — 80061 LIPID PANEL: CPT | Performed by: PHYSICIAN ASSISTANT

## 2019-09-03 RX ORDER — METOPROLOL SUCCINATE 25 MG/1
25 TABLET, EXTENDED RELEASE ORAL DAILY
Qty: 30 TABLET | Refills: 1 | Status: SHIPPED | OUTPATIENT
Start: 2019-09-03 | End: 2019-11-21

## 2019-09-03 ASSESSMENT — MIFFLIN-ST. JEOR: SCORE: 1374.32

## 2019-09-03 NOTE — LETTER
[facility]  [address] [place] [address]    September 5, 2019    Lay Solis  [address]  [place] [address]          Dear Lay,    Enclosed is a copy of your results. Triglyceride levels have increased.  This is usually due to increased carbohydrates in the diet.  Follow up per diabetes education.     Results for orders placed or performed in visit on 09/03/19   Hemoglobin A1c   Result Value Ref Range    Hemoglobin A1C 6.2 (H) 0 - 5.6 %   Albumin Random Urine Quantitative with Creat Ratio   Result Value Ref Range    Creatinine Urine 12 mg/dL    Albumin Urine mg/L 23 mg/L    Albumin Urine mg/g Cr 194.87 (H) 0 - 25 mg/g Cr   Lipid panel reflex to direct LDL Fasting   Result Value Ref Range    Cholesterol 174 <200 mg/dL    Triglycerides 171 (H) <150 mg/dL    HDL Cholesterol 43 (L) >49 mg/dL    LDL Cholesterol Calculated 97 <100 mg/dL    Non HDL Cholesterol 131 (H) <130 mg/dL   Comprehensive metabolic panel (BMP + Alb, Alk Phos, ALT, AST, Total. Bili, TP)   Result Value Ref Range    Sodium 136 133 - 144 mmol/L    Potassium 3.5 3.4 - 5.3 mmol/L    Chloride 101 94 - 109 mmol/L    Carbon Dioxide 28 20 - 32 mmol/L    Anion Gap 7 3 - 14 mmol/L    Glucose 127 (H) 70 - 99 mg/dL    Urea Nitrogen 12 7 - 30 mg/dL    Creatinine 0.73 0.52 - 1.04 mg/dL    GFR Estimate >90 >60 mL/min/[1.73_m2]    GFR Estimate If Black >90 >60 mL/min/[1.73_m2]    Calcium 8.7 8.5 - 10.1 mg/dL    Bilirubin Total 0.3 0.2 - 1.3 mg/dL    Albumin 3.3 (L) 3.4 - 5.0 g/dL    Protein Total 6.7 (L) 6.8 - 8.8 g/dL    Alkaline Phosphatase 73 40 - 150 U/L    ALT 27 0 - 50 U/L    AST 15 0 - 45 U/L       If you have any questions or concerns, please me or my clinic team at [phone].      Sincerely,        Bubba Singleton PA-C/bt

## 2019-09-04 ENCOUNTER — TELEPHONE (OUTPATIENT)
Dept: FAMILY MEDICINE | Facility: CLINIC | Age: 49
End: 2019-09-04

## 2019-09-04 DIAGNOSIS — N18.30 TYPE 2 DIABETES MELLITUS WITH STAGE 3 CHRONIC KIDNEY DISEASE, WITHOUT LONG-TERM CURRENT USE OF INSULIN (H): Primary | ICD-10-CM

## 2019-09-04 DIAGNOSIS — G47.9 SLEEP DISTURBANCE: ICD-10-CM

## 2019-09-04 DIAGNOSIS — E11.22 TYPE 2 DIABETES MELLITUS WITH STAGE 3 CHRONIC KIDNEY DISEASE, WITHOUT LONG-TERM CURRENT USE OF INSULIN (H): Primary | ICD-10-CM

## 2019-09-04 LAB
ALBUMIN SERPL-MCNC: 3.3 G/DL (ref 3.4–5)
ALP SERPL-CCNC: 73 U/L (ref 40–150)
ALT SERPL W P-5'-P-CCNC: 27 U/L (ref 0–50)
ANION GAP SERPL CALCULATED.3IONS-SCNC: 7 MMOL/L (ref 3–14)
AST SERPL W P-5'-P-CCNC: 15 U/L (ref 0–45)
BILIRUB SERPL-MCNC: 0.3 MG/DL (ref 0.2–1.3)
BUN SERPL-MCNC: 12 MG/DL (ref 7–30)
CALCIUM SERPL-MCNC: 8.7 MG/DL (ref 8.5–10.1)
CHLORIDE SERPL-SCNC: 101 MMOL/L (ref 94–109)
CHOLEST SERPL-MCNC: 174 MG/DL
CO2 SERPL-SCNC: 28 MMOL/L (ref 20–32)
CREAT SERPL-MCNC: 0.73 MG/DL (ref 0.52–1.04)
GFR SERPL CREATININE-BSD FRML MDRD: >90 ML/MIN/{1.73_M2}
GLUCOSE SERPL-MCNC: 127 MG/DL (ref 70–99)
HDLC SERPL-MCNC: 43 MG/DL
LDLC SERPL CALC-MCNC: 97 MG/DL
NONHDLC SERPL-MCNC: 131 MG/DL
POTASSIUM SERPL-SCNC: 3.5 MMOL/L (ref 3.4–5.3)
PROT SERPL-MCNC: 6.7 G/DL (ref 6.8–8.8)
SODIUM SERPL-SCNC: 136 MMOL/L (ref 133–144)
TRIGL SERPL-MCNC: 171 MG/DL

## 2019-09-04 NOTE — TELEPHONE ENCOUNTER
Called and informed message below. Patient informs would be willing to see diabetic ed here in [facility]. Please advise. Valencia Tiwari MA

## 2019-09-04 NOTE — TELEPHONE ENCOUNTER
Notes recorded by Bubba Singleton PA-C on 9/4/2019 at 3:10 PM CDT  Triglyceride levels have increased.  This is usually due to increased carbohydrates in the diet.  Follow up per diabetes education.  Rosalinda SANTOS

## 2019-09-05 RX ORDER — TRAZODONE HYDROCHLORIDE 50 MG/1
50 TABLET, FILM COATED ORAL AT BEDTIME
Qty: 90 TABLET | Refills: 1 | Status: SHIPPED | OUTPATIENT
Start: 2019-09-05 | End: 2020-01-16 | Stop reason: ALTCHOICE

## 2019-09-10 NOTE — TELEPHONE ENCOUNTER
Left patient message to return call with any question  Ruben Trivedi CMA on 9/10/2019 at 7:58 AM

## 2019-09-11 ENCOUNTER — TELEPHONE (OUTPATIENT)
Dept: FAMILY MEDICINE | Facility: CLINIC | Age: 49
End: 2019-09-11

## 2019-09-11 NOTE — TELEPHONE ENCOUNTER
Diabetes Education Scheduling Outreach #1:    Call to patient to schedule. Left message with phone number to call to schedule.    Plan for 2nd outreach attempt within 1 week.    Lexus Wiggins  [facility]  Diabetes and Nutrition Scheduling

## 2019-09-17 NOTE — TELEPHONE ENCOUNTER
Diabetes Education Scheduling Outreach #2:    Call to patient to schedule. Left message with phone number to call to schedule.    Lexus Wiggins  [facility]  Diabetes and Nutrition Scheduling

## 2019-11-21 DIAGNOSIS — I10 HTN, GOAL BELOW 140/90: ICD-10-CM

## 2019-11-21 DIAGNOSIS — E89.0 POSTSURGICAL HYPOTHYROIDISM: ICD-10-CM

## 2019-11-21 NOTE — LETTER
November 25, 2019          Lay Solis,  [address] [place] [id number]          Dear Lay Solis      We have refill your medication and sent it over to your pharmacy for . Please give them a call to see when you can  your medication. For further refills, you are due for a follow up before more refills can be dispense. Please call us at the number below to your get appointment schedule.    If you have received your health care elsewhere, please call the clinic so the information can be documented in your chart.    Please call [phone] or message us through your [employer] account to schedule an appointment or provide information for your chart.     Feel free to contact us if you have any questions or concerns!    I look forward to seeing you and working with you on your health care needs.     Sincerely,       Your [facility] Care Team/HV

## 2019-11-21 NOTE — TELEPHONE ENCOUNTER
"Requested Prescriptions   Pending Prescriptions Disp Refills     metoprolol succinate ER (TOPROL-XL) 25 MG 24 hr tablet [Pharmacy Med Name: Metoprolol Succinate ER Oral Tablet Extended Release 24 Hour 25 MG]  Last Written Prescription Date:  09/03/19  Last Fill Quantity: 30,  # refills: 1   Last Office Visit with SHEY, BASSAM or [facility] prescribing provider:  09/03/19Bahman   Future Office Visit:    30 tablet 0     Sig: TAKE ONE TABLET BY MOUTH ONE TIME DAILY       Beta-Blockers Protocol Failed - 11/21/2019  1:32 PM        Failed - Blood pressure under 140/90 in past 12 months     BP Readings from Last 3 Encounters:   09/03/19 (!) 150/90   04/18/19 (!) 146/94   01/16/19 138/90                 Passed - Patient is age 6 or older        Passed - Recent (12 mo) or future (30 days) visit within the authorizing provider's specialty     Patient has had an office visit with the authorizing provider or a provider within the authorizing providers department within the previous 12 mos or has a future within next 30 days. See \"Patient Info\" tab in inbasket, or \"Choose Columns\" in Meds & Orders section of the refill encounter.              Passed - Medication is active on med list          "

## 2019-11-23 RX ORDER — METOPROLOL SUCCINATE 25 MG/1
TABLET, EXTENDED RELEASE ORAL
Qty: 30 TABLET | Refills: 0 | Status: SHIPPED | OUTPATIENT
Start: 2019-11-23 | End: 2019-12-30

## 2019-11-23 RX ORDER — LEVOTHYROXINE SODIUM 150 UG/1
150 TABLET ORAL DAILY
Qty: 30 TABLET | Refills: 0 | Status: SHIPPED | OUTPATIENT
Start: 2019-11-23 | End: 2019-12-30

## 2019-11-23 NOTE — TELEPHONE ENCOUNTER
Medication is being filled for 1 time refill only due to:  Patient needs to be seen because overdue for B/P recheck.also needs thyroid labs.  Please schedule appt.  Estela Wade RN

## 2019-11-24 NOTE — TELEPHONE ENCOUNTER
Medication is being filled for 1 time refill only due to:  Patient needs to be seen because needs recheck.   Estela Wade RN

## 2019-12-09 ENCOUNTER — TELEPHONE (OUTPATIENT)
Dept: FAMILY MEDICINE | Facility: CLINIC | Age: 49
End: 2019-12-09

## 2019-12-09 NOTE — TELEPHONE ENCOUNTER
Panel Management Review      Patient has the following on her problem list:     Diabetes    ASA: Passed    Last A1C  Lab Results   Component Value Date    A1C 6.2 09/03/2019    A1C 6.2 04/18/2019    A1C 6.1 11/15/2018     A1C tested: Passed    Last LDL:    Lab Results   Component Value Date    CHOL 174 09/03/2019     Lab Results   Component Value Date    HDL 43 09/03/2019     Lab Results   Component Value Date    LDL 97 09/03/2019     Lab Results   Component Value Date    TRIG 171 09/03/2019     No results found for: CHOLHDLRATIO  Lab Results   Component Value Date    NHDL 131 09/03/2019       Is the patient on a Statin? YES             Is the patient on Aspirin? YES    Medications     HMG CoA Reductase Inhibitors     STATIN NOT PRESCRIBED (INTENTIONAL)       Salicylates     aspirin 81 MG EC tablet             Last three blood pressure readings:  BP Readings from Last 3 Encounters:   09/03/19 (!) 150/90   04/18/19 (!) 146/94   01/16/19 138/90       Date of last diabetes office visit: 9/3/19     Tobacco History:     History   Smoking Status     Never Smoker   Smokeless Tobacco     Never Used         Hypertension   Last three blood pressure readings:  BP Readings from Last 3 Encounters:   09/03/19 (!) 150/90   04/18/19 (!) 146/94   01/16/19 138/90     Blood pressure: FAILED    HTN Guidelines:  Less than 140/90      Composite cancer screening  Chart review shows that this patient is due/due soon for the following None  Summary:    Patient is due/failing the following:   A1C and BP CHECK    Action needed:   Patient needs office visit for Diabetic check and BP check.    Type of outreach:    Sent letter.    Questions for provider review:    None                                                                                                                                    Dorys MENDES CMA       Chart routed to none .

## 2019-12-09 NOTE — LETTER
December 9, 2019          Lay Solis,  [address] [place] [id number]        Dear Lay Solis      Monitoring and managing your preventative and chronic health conditions are very important to us. Our records indicate that you have not scheduled for Appointment with your provider, Diabetic Check and HgbA1C, BP check  which was recommended by Sisi Devine CNP.        If you have received your health care elsewhere, please call the clinic so the information can be documented in your chart.    Please call [phone] or message us through your Photobucket account to schedule an appointment or provide information for your chart.     Feel free to contact us if you have any questions or concerns!    I look forward to seeing you and working with you on your health care needs.     Sincerely,       Your [facility] Team/kalyani

## 2019-12-30 DIAGNOSIS — E89.0 POSTSURGICAL HYPOTHYROIDISM: ICD-10-CM

## 2019-12-30 DIAGNOSIS — I10 HTN, GOAL BELOW 140/90: ICD-10-CM

## 2019-12-30 RX ORDER — LEVOTHYROXINE SODIUM 150 UG/1
TABLET ORAL
Qty: 30 TABLET | Refills: 0 | Status: SHIPPED | OUTPATIENT
Start: 2019-12-30 | End: 2020-02-11

## 2019-12-30 RX ORDER — METOPROLOL SUCCINATE 25 MG/1
TABLET, EXTENDED RELEASE ORAL
Qty: 30 TABLET | Refills: 0 | Status: SHIPPED | OUTPATIENT
Start: 2019-12-30 | End: 2020-01-16 | Stop reason: DRUGHIGH

## 2019-12-31 NOTE — TELEPHONE ENCOUNTER
Medication is being filled for 1 time refill only due to:  Patient needs to be seen because NEEDS RECHECK.   Pt has appt scheduled.  Estela Wade RN

## 2019-12-31 NOTE — TELEPHONE ENCOUNTER
Medication is being filled for 1 time refill only due to:  Patient needs to be seen because need recheck.   Pt has upcoming appt.  Estela Wade RN

## 2020-01-16 ENCOUNTER — OFFICE VISIT (OUTPATIENT)
Dept: FAMILY MEDICINE | Facility: CLINIC | Age: 50
End: 2020-01-16
Payer: COMMERCIAL

## 2020-01-16 VITALS
HEART RATE: 80 BPM | HEIGHT: 65 IN | OXYGEN SATURATION: 100 % | SYSTOLIC BLOOD PRESSURE: 158 MMHG | DIASTOLIC BLOOD PRESSURE: 70 MMHG | RESPIRATION RATE: 18 BRPM | WEIGHT: 160 LBS | BODY MASS INDEX: 26.66 KG/M2 | TEMPERATURE: 96.4 F

## 2020-01-16 DIAGNOSIS — G47.00 PERSISTENT INSOMNIA: ICD-10-CM

## 2020-01-16 DIAGNOSIS — I10 HTN, GOAL BELOW 140/90: Primary | ICD-10-CM

## 2020-01-16 PROCEDURE — 99214 OFFICE O/P EST MOD 30 MIN: CPT | Performed by: PHYSICIAN ASSISTANT

## 2020-01-16 RX ORDER — BLOOD PRESSURE TEST KIT-WRIST
KIT MISCELLANEOUS
Qty: 1 EACH | Refills: 0 | Status: SHIPPED | OUTPATIENT
Start: 2020-01-16 | End: 2020-11-18

## 2020-01-16 RX ORDER — METOPROLOL SUCCINATE 50 MG/1
50 TABLET, EXTENDED RELEASE ORAL DAILY
Qty: 60 TABLET | Refills: 1 | Status: SHIPPED | OUTPATIENT
Start: 2020-01-16 | End: 2020-01-29

## 2020-01-16 RX ORDER — DIPHENHYDRAMINE HCL 50 MG
50 CAPSULE ORAL EVERY 6 HOURS PRN
Qty: 24 CAPSULE | Refills: 1 | Status: SHIPPED | OUTPATIENT
Start: 2020-01-16 | End: 2020-05-13

## 2020-01-16 RX ORDER — LANOLIN ALCOHOL/MO/W.PET/CERES
3 CREAM (GRAM) TOPICAL
Qty: 30 TABLET | Refills: 1 | Status: SHIPPED | OUTPATIENT
Start: 2020-01-16 | End: 2020-05-13

## 2020-01-16 ASSESSMENT — MIFFLIN-ST. JEOR: SCORE: 1351.64

## 2020-01-16 NOTE — PROGRESS NOTES
"Subjective     Lay Solis is a 49 year old female who presents to clinic today for the following health issues:    HPI   Hypertension Follow-up      Do you check your blood pressure regularly outside of the clinic? No     Are you following a low salt diet? No    Are your blood pressures ever more than 140 on the top number (systolic) OR more   than 90 on the bottom number (diastolic), for example 140/90? Yes      How many servings of fruits and vegetables do you eat daily?  0-1    On average, how many sweetened beverages do you drink each day (Examples: soda, juice, sweet tea, etc.  Do NOT count diet or artificially sweetened beverages)?   2    How many days per week do you exercise enough to make your heart beat faster? 0    How many minutes a day do you exercise enough to make your heart beat faster? N/A    How many days per week do you miss taking your medication? 0      Review of Systems   ROS COMP: Constitutional, HEENT, cardiovascular, pulmonary, gi and gu systems are negative, except as otherwise noted.      Objective    BP (!) 158/70   Pulse 80   Temp 96.4  F (35.8  C) (Oral)   Resp 18   Ht 1.651 m (5' 5\")   Wt 72.6 kg (160 lb)   LMP 01/02/2020 (Approximate)   SpO2 100%   BMI 26.63 kg/m    Body mass index is 26.63 kg/m .  Physical Exam   GENERAL: healthy, alert and no distress  NECK: no adenopathy, no asymmetry, masses, or scars and thyroid normal to palpation  RESP: lungs clear to auscultation - no rales, rhonchi or wheezes  CV: regular rate and rhythm, normal S1 S2, no S3 or S4, no murmur, click or rub, no peripheral edema and peripheral pulses strong  MS: no gross musculoskeletal defects noted, no edema    Diagnostic Test Results:  Labs reviewed in Epic        Assessment & Plan     1. HTN, goal below 140/90  - metoprolol succinate ER (TOPROL-XL) 50 MG 24 hr tablet; Take 1 tablet (50 mg) by mouth daily  Dispense: 60 tablet; Refill: 1  - Blood Pressure Monitoring ( WRIST CUFF BP MONITOR) MISC; " Use daily  Dispense: 1 each; Refill: 0    2. Persistent insomnia  - diphenhydrAMINE (BENADRYL) 50 MG capsule; Take 1 capsule (50 mg) by mouth every 6 hours as needed for sleep  Dispense: 24 capsule; Refill: 1  - melatonin 3 MG tablet; Take 1 tablet (3 mg) by mouth nightly as needed for sleep  Dispense: 30 tablet; Refill: 1     BP check 3-4 x per week  Use medication as directed.  Patient education materials dispensed and reviewed.  Follow up in 1-2- months.  Schedule CPE  Patient amenable to this follow up plan.   Bubba Singleton PA-C  [facility]

## 2020-01-16 NOTE — PATIENT INSTRUCTIONS
Patient Education     Treating Insomnia     Learning to relax before bedtime can improve your sleep.   Good sleeping habits are a key part of treatment. If needed, some medicines may help you sleep better at first. Making healthy lifestyle changes and learning to relax can improve your sleep. Treating insomnia takes commitment. But trust that your efforts will pay off. Be sure to talk with your healthcare provider before taking any medicine.  Healthy lifestyle  Your lifestyle affects your health and your sleep. Here are some healthy habits:    Keep a regular sleep schedule. Go to bed and get up at the same time each day.    Exercise regularly. It may help you reduce stress. Avoid strenuous exercise for 2 to 4 hours before bedtime.    Avoid or limit naps, especially in the late afternoon.    Use your bed only for sleep and sex.    Don t spend too much time in bed trying to fall asleep. If you can t fall asleep, get up and do something (no electronics) until you become tired and drowsy.    Avoid or limit caffeine and nicotine for up to 6 hours before bedtime. They can keep you awake at night.     Also avoid alcohol for at least 4 to 6 hours before bedtime. It may help you fall asleep at first. But you will have more awakenings during the night. And your sleep will not be restful.  Before bedtime  To sleep better every night, try these tips:    Have a bedtime routine to let your body and mind know when it s time to sleep.    Think of going to bed as relaxing and enjoyable. Sleep will come sooner.    If your worries don t let you sleep, write them down in a diary. Then close it, and go to bed.    Make sure the room is not too hot or too cold. If it s not dark enough, an eye mask can help. If it s noisy, try using earplugs.    Don't eat a large meal just before bedtime.    Remove noises, bright lights, TVs, cell phones, and computers from your sleeping environment.    Use a comfortable mattress and pillow.  Learn to  relax  Stress, anxiety, and body tension may keep you awake at night. To unwind before bedtime, try a warm bath, meditation, or yoga. Also try the following:    Deep breathing. Sit or lie back in a chair. Take a slow, deep breath. Hold it for 5 counts. Then breathe out slowly through your mouth. Keep doing this until you feel relaxed.    Progressive muscle relaxation. Tense and then relax the muscles in your body as you breathe deeply. Start with your feet and work up your body to your neck and face.  Cognitive Behavioral Therapy (CBT)  CBT is the most effective treatment for long-term insomnia. It tries to address the underlying causes of your sleep problems, including your habits and how you think about sleep.  Individual Therapy  Bk Molina PsyD, NORI  Insomnia   [facility]: [phone]    [facility]: [phone]  [facility]  Visit [email or web address].org or call [phone] to find a clinic close to you.  Suggested resources  The resources below may help you relax. This list is for information only. [facility] is not responsible for the quality of services or the actions of any person or organization. There may be a fee to use some of these resources.  Insomnia treatment books  Ivanna Mind by Tessa Dailey and Fina Locke (2013)  Overcoming Insomnia by Rafiq Montanez and Tessa Dailey (2008)  Quiet Your Mind and Get to Sleep: Solutions to Insomnia for Those with Depression, Anxiety or Chronic Pain by Tessa Dailey and Fina Locke (2009)  No More Sleepless Nights by Ronen Duckworth and Yecenia Franz (1996)  Say Ivanna to Insomnia by Jaison Ross (2009)  The Insomnia Workbook by Nelida Hollingsworth and Tucker Moyer (2009)  The Insomnia Answer by Nate Castro and Christiano Roberts (2006)  Stress management and relaxation books  The Relaxation and Stress Reduction Workbook by Marleny Hammond,  Pili Medeiros and Bucky Zhou (2008)  Stress Management Workbook: Techniques and Self-Assessment Procedures by Faye Abbasi and Edmar Marie (1997)  A Mindfulness-Based Stress Reduction Workbook by Brad Dugan and Cady Del Toro (2010)  The Complete Stress Management Workbook by Beto Hensley, Blayne Redding and Jim Merino (1996)  Online programs  www.SHUTi.me (pronounced shut eye). There is a fee for this program.   www.sleepIO.com (pronounced sleep ee oh). There is a fee for this program.  Other online resources  Deep breathing and progressive muscle relaxation (PMR):    http://www.Dayima  Meditation:    www.CellNovoranLoaded Pocket    www.SavvyCardguidedConisusmeditationConisussite.com  Guided imagery:    http://www.Dayima    http://[employer].RSVP Law  (click on  Resource Library,  then choose  Meditation, Relaxation, Guided Imagery )  Apps for your mobile device:    Autogenic Training Progressive Muscle Relaxation by SavvyCard Dylon    Calm: Meditation and Sleep Stories by Calm.com, Inc.    Quotify Technology Timer - Meditation Diomedes by WebXiom.  This is not a prescription and these resources are optional. You must pay for any costs when using these resources. Please ask your insurance carrier if you can be reimbursed for these resources. If so, you are responsible for sending the needed details to your insurance carrier. These resources may also be tax deductible as medical expenses. Check with your .  Date Last Reviewed: 5/18/2018  Modifications clinically reviewed by Bk Molina PsyD, NORI, [facility], Director of the Insomnia and Behavioral Sleep Health Program, [facility].    6312-9552 The [employer]. [address] [place] [address], [place] [address]. All rights reserved. This information is not intended as a substitute for professional medical care. Always follow your healthcare professional's instructions. This information has been modified by  your health care provider with permission from the publisher.

## 2020-01-29 ENCOUNTER — OFFICE VISIT (OUTPATIENT)
Dept: FAMILY MEDICINE | Facility: CLINIC | Age: 50
End: 2020-01-29
Payer: COMMERCIAL

## 2020-01-29 VITALS
WEIGHT: 161 LBS | HEIGHT: 65 IN | BODY MASS INDEX: 26.82 KG/M2 | RESPIRATION RATE: 18 BRPM | HEART RATE: 83 BPM | DIASTOLIC BLOOD PRESSURE: 80 MMHG | TEMPERATURE: 98.3 F | SYSTOLIC BLOOD PRESSURE: 152 MMHG | OXYGEN SATURATION: 99 %

## 2020-01-29 DIAGNOSIS — I10 HTN, GOAL BELOW 140/90: ICD-10-CM

## 2020-01-29 PROCEDURE — 99213 OFFICE O/P EST LOW 20 MIN: CPT | Performed by: PHYSICIAN ASSISTANT

## 2020-01-29 RX ORDER — METOPROLOL SUCCINATE 50 MG/1
50 TABLET, EXTENDED RELEASE ORAL DAILY
Qty: 60 TABLET | Refills: 1 | Status: SHIPPED | OUTPATIENT
Start: 2020-01-29 | End: 2020-07-29

## 2020-01-29 RX ORDER — LISINOPRIL 40 MG/1
40 TABLET ORAL DAILY
Qty: 90 TABLET | Refills: 1 | Status: SHIPPED | OUTPATIENT
Start: 2020-01-29 | End: 2020-10-01

## 2020-01-29 ASSESSMENT — MIFFLIN-ST. JEOR: SCORE: 1356.17

## 2020-01-29 NOTE — PROGRESS NOTES
Subjective     Lay Solis is a 49 year old female who presents to clinic today for the following health issues:    HPI   Hypertension Follow-up      Do you check your blood pressure regularly outside of the clinic? Yes     Are you following a low salt diet? Yes    Are your blood pressures ever more than 140 on the top number (systolic) OR more   than 90 on the bottom number (diastolic), for example 140/90? Yes      How many servings of fruits and vegetables do you eat daily?  0-1    On average, how many sweetened beverages do you drink each day (Examples: soda, juice, sweet tea, etc.  Do NOT count diet or artificially sweetened beverages)?   0    How many days per week do you exercise enough to make your heart beat faster? n/a    How many minutes a day do you exercise enough to make your heart beat faster? n/a    How many days per week do you miss taking your medication? 0    Med review performed.  Patient wasn't taking lisinopril with metoprolol.  She will start and follow up in 2 weeks.     Review of Systems   ROS COMP: Constitutional, HEENT, cardiovascular, pulmonary, gi and gu systems are negative, except as otherwise noted.      Objective    LMP 01/02/2020 (Approximate)   There is no height or weight on file to calculate BMI.  Physical Exam     Total visit time is 15 Minutes with > 10 Minutes spent in care and consultation regarding HTN management and follow up plan.      Diagnostic Test Results:  none         Assessment & Plan     1. HTN, goal below 140/90  -Lisinopril 40 mg daily  - metoprolol succinate ER (TOPROL-XL) 50 MG 24 hr tablet; Take 1 tablet (50 mg) by mouth daily  Dispense: 60 tablet; Refill: 1     BP checks 4 x per week  Follow up in 2 weeks.  Patient amenable to this follow up plan.   Bubba Singleton PA-C  [facility]

## 2020-02-13 ENCOUNTER — DOCUMENTATION ONLY (OUTPATIENT)
Dept: OPTOMETRY | Facility: CLINIC | Age: 50
End: 2020-02-13

## 2020-03-19 DIAGNOSIS — E89.0 POSTSURGICAL HYPOTHYROIDISM: ICD-10-CM

## 2020-03-22 NOTE — TELEPHONE ENCOUNTER
Routing refill request to provider for review/approval because:  Lanette given x1 and patient did not follow up, please advise  Labs out of range:    TSH   Date Value Ref Range Status   11/15/2018 0.29 (L) 0.40 - 4.00 mU/L Final

## 2020-03-24 ENCOUNTER — VIRTUAL VISIT (OUTPATIENT)
Dept: FAMILY MEDICINE | Facility: CLINIC | Age: 50
End: 2020-03-24
Payer: COMMERCIAL

## 2020-03-24 DIAGNOSIS — E89.0 POSTSURGICAL HYPOTHYROIDISM: ICD-10-CM

## 2020-03-24 DIAGNOSIS — D50.9 IRON DEFICIENCY ANEMIA, UNSPECIFIED IRON DEFICIENCY ANEMIA TYPE: ICD-10-CM

## 2020-03-24 PROCEDURE — 98966 PH1 ASSMT&MGMT NQHP 5-10: CPT | Performed by: PHYSICIAN ASSISTANT

## 2020-03-24 RX ORDER — LEVOTHYROXINE SODIUM 150 UG/1
TABLET ORAL
Qty: 30 TABLET | Refills: 0 | OUTPATIENT
Start: 2020-03-24

## 2020-03-24 RX ORDER — LEVOTHYROXINE SODIUM 150 UG/1
TABLET ORAL
Qty: 30 TABLET | Refills: 1 | Status: SHIPPED | OUTPATIENT
Start: 2020-03-24 | End: 2020-06-11

## 2020-03-24 RX ORDER — FERROUS SULFATE 325(65) MG
TABLET ORAL
Qty: 30 TABLET | Refills: 1 | Status: SHIPPED | OUTPATIENT
Start: 2020-03-24 | End: 2021-09-02

## 2020-03-24 NOTE — PROGRESS NOTES
"Lay Solis is a 49 year old female who is being evaluated via a billable telephone visit.      The patient has been notified of following:     \"This telephone visit will be conducted via a call between you and your physician/provider. We have found that certain health care needs can be provided without the need for a physical exam.  This service lets us provide the care you need with a short phone conversation.  If a prescription is necessary we can send it directly to your pharmacy.  If lab work is needed we can place an order for that and you can then stop by our lab to have the test done at a later time.    If during the course of the call the physician/provider feels a telephone visit is not appropriate, you will not be charged for this service.\"     Lay Solis complains of  No chief complaint on file.      I have reviewed and updated the patient's Past Medical History, Social History, Family History and Medication List.    ALLERGIES  Patient has no known allergies.    Patient presents by phone for refills.  Medications reviewed and Patient is aware that lab follow up was recommended.  I am amenable to patient waiting for labs for now.  Recheck in 1 month.     Assessment/Plan:  1. Postsurgical hypothyroidism  - levothyroxine (SYNTHROID/LEVOTHROID) 150 MCG tablet; Take 1 tablet (150 mcg) by mouth daily.  Need to be seen for recheck  Dispense: 30 tablet; Refill: 1    2. Iron deficiency anemia, unspecified iron deficiency anemia type  - ferrous sulfate (FEROSUL) 325 (65 Fe) MG tablet; TAKE 1 TABLET BY MOUTH ONCE EVERY OTHER DAY  Dispense: 30 tablet; Refill: 1     Phone call duration:  10 minutes    Bubba Singleton PA-C    "

## 2020-04-03 DIAGNOSIS — G47.9 SLEEP DISTURBANCE: ICD-10-CM

## 2020-04-06 NOTE — TELEPHONE ENCOUNTER
"Please contact patient to ask if she is currently taking this medication. It was discontinued by Mani 01/16/2020.     Requested Prescriptions   Pending Prescriptions Disp Refills     traZODone (DESYREL) 50 MG tablet [Pharmacy Med Name: traZODone HCl Oral Tablet 50 MG] 30 tablet 0     Sig: Take 1 tablet (50 mg) by mouth At Bedtime       Serotonin Modulators Failed - 4/3/2020  7:17 PM        Failed - Medication is active on med list        Passed - Recent (12 mo) or future (30 days) visit within the authorizing provider's specialty     Patient has had an office visit with the authorizing provider or a provider within the authorizing providers department within the previous 12 mos or has a future within next 30 days. See \"Patient Info\" tab in inbasket, or \"Choose Columns\" in Meds & Orders section of the refill encounter.              Passed - Patient is age 18 or older        Passed - No active pregnancy on record        Passed - No positive pregnancy test in past 12 months           "

## 2020-04-06 NOTE — TELEPHONE ENCOUNTER
Called patient and left VM to call clinic to see if she is taking medication. Okay to speak to anyone on purple team.  Luly RUCKER CMA ([facility])

## 2020-04-07 NOTE — TELEPHONE ENCOUNTER
2nd attempt. Called patient and left VM to call clinic to see if she is taking medication.  Ruben Trivedi CMA on 4/7/2020 at 8:28 AM

## 2020-04-08 RX ORDER — TRAZODONE HYDROCHLORIDE 50 MG/1
50 TABLET, FILM COATED ORAL AT BEDTIME
Qty: 30 TABLET | Refills: 0 | OUTPATIENT
Start: 2020-04-08

## 2020-04-08 NOTE — TELEPHONE ENCOUNTER
Attempt 3, multiple attempts made to contact patient. Please address refill.  Luly RUCKER CMA ([facility])

## 2020-05-12 DIAGNOSIS — I10 HTN, GOAL BELOW 140/90: ICD-10-CM

## 2020-05-12 DIAGNOSIS — G47.00 PERSISTENT INSOMNIA: ICD-10-CM

## 2020-05-12 DIAGNOSIS — G47.9 SLEEP DISTURBANCE: ICD-10-CM

## 2020-05-12 NOTE — TELEPHONE ENCOUNTER
Please call patient. Is she taking this medication? It was discontinued on 1/16/2020 and switched to benadryl and melatonin.     Leda Silver RN

## 2020-05-13 RX ORDER — LANOLIN ALCOHOL/MO/W.PET/CERES
3 CREAM (GRAM) TOPICAL
Qty: 30 TABLET | Refills: 1 | Status: SHIPPED | OUTPATIENT
Start: 2020-05-13 | End: 2020-06-09

## 2020-05-13 RX ORDER — TRAZODONE HYDROCHLORIDE 50 MG/1
50 TABLET, FILM COATED ORAL AT BEDTIME
Qty: 90 TABLET | Refills: 0 | OUTPATIENT
Start: 2020-05-13

## 2020-05-13 RX ORDER — DIPHENHYDRAMINE HCL 50 MG
50 CAPSULE ORAL EVERY 6 HOURS PRN
Qty: 24 CAPSULE | Refills: 1 | Status: SHIPPED | OUTPATIENT
Start: 2020-05-13 | End: 2021-01-04

## 2020-05-13 NOTE — TELEPHONE ENCOUNTER
Spoke to patient and she is not taking trazodone. Refill request yvan'd up.  Luly RUCKER CMA ([facility])

## 2020-06-09 ENCOUNTER — TELEPHONE (OUTPATIENT)
Dept: FAMILY MEDICINE | Facility: CLINIC | Age: 50
End: 2020-06-09

## 2020-06-09 DIAGNOSIS — G47.9 SLEEP DISTURBANCE: ICD-10-CM

## 2020-06-09 DIAGNOSIS — E89.0 POSTSURGICAL HYPOTHYROIDISM: ICD-10-CM

## 2020-06-09 NOTE — TELEPHONE ENCOUNTER
Reason for call:  Other   Patient called regarding (reason for call): call back  Additional comments: Patient is calling about to discuss her sleeping medication, please call back      Phone number to reach patient:  Home number on file [phone] (home)    Best Time:  any    Can we leave a detailed message on this number?  YES    Travel screening: Not Applicable

## 2020-06-09 NOTE — TELEPHONE ENCOUNTER
Called patient. She states that she took 3-4 tablets of melatonin 3 mg at night before sleep and it just made her feel nervous and agitated. States that she stopped the medication at the beginning of June. She is requesting to go back on Trazodone at same dose that she was taking.

## 2020-06-10 NOTE — TELEPHONE ENCOUNTER
"Routing refill request to provider for review/approval because:  Labs not current:  TSH    Requested Prescriptions   Pending Prescriptions Disp Refills    levothyroxine (SYNTHROID/LEVOTHROID) 150 MCG tablet [Pharmacy Med Name: Levothyroxine Sodium Oral Tablet 150 MCG] 30 tablet 0     Sig: Take 1 tablet (150 mcg) by mouth daily.  Need to be seen for recheck       Thyroid Protocol Failed - 6/9/2020  6:21 PM        Failed - Normal TSH on file in past 12 months     Recent Labs   Lab Test 11/15/18  0836   TSH 0.29*              Passed - Patient is 12 years or older        Passed - Recent (12 mo) or future (30 days) visit within the authorizing provider's specialty     Patient has had an office visit with the authorizing provider or a provider within the authorizing providers department within the previous 12 mos or has a future within next 30 days. See \"Patient Info\" tab in inbasket, or \"Choose Columns\" in Meds & Orders section of the refill encounter.              Passed - Medication is active on med list        Passed - No active pregnancy on record     If patient is pregnant or has had a positive pregnancy test, please check TSH.          Passed - No positive pregnancy test in past 12 months     If patient is pregnant or has had a positive pregnancy test, please check TSH.             Leda Silver RN  "

## 2020-06-11 RX ORDER — LEVOTHYROXINE SODIUM 150 UG/1
TABLET ORAL
Qty: 15 TABLET | Refills: 0 | Status: SHIPPED | OUTPATIENT
Start: 2020-06-11 | End: 2020-07-15

## 2020-06-12 RX ORDER — TRAZODONE HYDROCHLORIDE 50 MG/1
50 TABLET, FILM COATED ORAL AT BEDTIME
Qty: 60 TABLET | Refills: 1 | Status: SHIPPED | OUTPATIENT
Start: 2020-06-12 | End: 2020-09-30

## 2020-07-15 ENCOUNTER — TELEPHONE (OUTPATIENT)
Dept: FAMILY MEDICINE | Facility: CLINIC | Age: 50
End: 2020-07-15

## 2020-07-15 DIAGNOSIS — E89.0 POSTSURGICAL HYPOTHYROIDISM: ICD-10-CM

## 2020-07-15 NOTE — TELEPHONE ENCOUNTER
Reason for call:  Medication   If this is a refill request, has the caller requested the refill from the pharmacy already? Yes  Will the patient be using a [facility]? No  Name of the pharmacy and phone number for the current request: [id number] - [place], [address]  [phone]    Name of the medication requested: Levothyroxine    Other request: Patient is out of medication and pharmacy told her she was to make an appointment and she is scheduled to see you next Tuesday. Would like refill. Thank you.    Phone number to reach patient:  Home number on file [phone] (home)    Best Time:  any    Can we leave a detailed message on this number?  YES    Travel screening: Not Applicable

## 2020-07-15 NOTE — TELEPHONE ENCOUNTER
Routing refill request to provider for review/approval because:  Labs not current:  TSH  Pt has appt scheduled for 07/21. She is out of medication.

## 2020-07-16 DIAGNOSIS — E89.0 POSTSURGICAL HYPOTHYROIDISM: ICD-10-CM

## 2020-07-16 RX ORDER — LEVOTHYROXINE SODIUM 150 UG/1
150 TABLET ORAL DAILY
Qty: 15 TABLET | Refills: 0 | Status: SHIPPED | OUTPATIENT
Start: 2020-07-16 | End: 2020-07-21

## 2020-07-17 RX ORDER — LEVOTHYROXINE SODIUM 150 UG/1
TABLET ORAL
Qty: 30 TABLET | Refills: 0 | OUTPATIENT
Start: 2020-07-17

## 2020-07-20 NOTE — PROGRESS NOTES
"Lay Solis is a 50 year old female who is being evaluated via a billable telephone visit.      The patient has been notified of following:     \"This telephone visit will be conducted via a call between you and your physician/provider. We have found that certain health care needs can be provided without the need for a physical exam.  This service lets us provide the care you need with a short phone conversation.  If a prescription is necessary we can send it directly to your pharmacy.  If lab work is needed we can place an order for that and you can then stop by our lab to have the test done at a later time.    Telephone visits are billed at different rates depending on your insurance coverage. During this emergency period, for some insurers they may be billed the same as an in-person visit.  Please reach out to your insurance provider with any questions.    If during the course of the call the physician/provider feels a telephone visit is not appropriate, you will not be charged for this service.\"    Patient has given verbal consent for Telephone visit?  Yes    What phone number would you like to be contacted at? [phone]    How would you like to obtain your AVS? Mail a copy    Subjective     Lay Solis is a 50 year old female who presents via phone visit today for the following health issues:    HPI    Hypothyroidism Follow-up      Since last visit, patient describes the following symptoms: constipation      How many servings of fruits and vegetables do you eat daily?  2-3    On average, how many sweetened beverages do you drink each day (Examples: soda, juice, sweet tea, etc.  Do NOT count diet or artificially sweetened beverages)?   2 cup of sweet tea    How many days per week do you exercise enough to make your heart beat faster? Not exercising     How many minutes a day do you exercise enough to make your heart beat faster? Not exercising   How many days per week do you miss taking your medication? " 2    What makes it hard for you to take your medications?  remembering to take    Patient Active Problem List   Diagnosis     Anemia     History of thyroid cancer     Hypertension, benign     Postsurgical hypothyroidism     Thyroid cancer (H)     Type 2 diabetes mellitus with renal complication (H)     Past Surgical History:   Procedure Laterality Date     ENT SURGERY      partial thyroidectomy     GI SURGERY      cesearean section       Social History     Tobacco Use     Smoking status: Never Smoker     Smokeless tobacco: Never Used   Substance Use Topics     Alcohol use: No     Family History   Problem Relation Age of Onset     Glaucoma No family hx of      Macular Degeneration No family hx of          Current Outpatient Medications   Medication Sig Dispense Refill     blood glucose (NO BRAND SPECIFIED) test strip Use to test blood sugar one times daily or as directed. To accompany: Blood Glucose Monitor Brands: per insurance. 100 strip 6     blood glucose monitoring (NO BRAND SPECIFIED) meter device kit Use to test blood sugar 1 time daily or as directed. 1 kit 0     Blood Pressure Monitoring ( WRIST CUFF BP MONITOR) MISC Use daily 1 each 0     diphenhydrAMINE (BENADRYL) 50 MG capsule Take 1 capsule (50 mg) by mouth every 6 hours as needed for sleep 24 capsule 1     ferrous sulfate (FEROSUL) 325 (65 Fe) MG tablet TAKE 1 TABLET BY MOUTH ONCE EVERY OTHER DAY 30 tablet 1     hydrochlorothiazide (HYDRODIURIL) 25 MG tablet TAKE ONE TABLET BY MOUTH ONE TIME DAILY  30 tablet 0     levothyroxine (SYNTHROID/LEVOTHROID) 150 MCG tablet Take 1 tablet (150 mcg) by mouth daily 90 tablet 1     lisinopril (PRINIVIL/ZESTRIL) 40 MG tablet Take 1 tablet (40 mg) by mouth daily 90 tablet 1     metoprolol succinate ER (TOPROL-XL) 50 MG 24 hr tablet Take 1 tablet (50 mg) by mouth daily 60 tablet 1     thin (NO BRAND SPECIFIED) lancets Use to test blood sugar one times daily or as directed. To accompany: Blood Glucose Monitor Brands:  per insurance. 100 each 3     traZODone (DESYREL) 50 MG tablet Take 1 tablet (50 mg) by mouth At Bedtime 60 tablet 1     aspirin 81 MG EC tablet Take 1 tablet (81 mg) by mouth daily (Patient not taking: Reported on 3/24/2020) 90 tablet 3     No Known Allergies    Reviewed and updated as needed this visit by Provider         Review of Systems   Constitutional, HEENT, cardiovascular, pulmonary, gi and gu systems are negative, except as otherwise noted.       Objective   Reported vitals:  There were no vitals taken for this visit.   healthy, alert and no distress  PSYCH: Alert and oriented times 3; coherent speech, normal   rate and volume, able to articulate logical thoughts, able   to abstract reason, no tangential thoughts, no hallucinations   or delusions  Her affect is normal  RESP: No cough, no audible wheezing, able to talk in full sentences  Remainder of exam unable to be completed due to telephone visits    Diagnostic Test Results:  Labs reviewed in Epic  none         Assessment/Plan:    1. Postsurgical hypothyroidism  - **TSH with free T4 reflex FUTURE anytime; Future  - levothyroxine (SYNTHROID/LEVOTHROID) 150 MCG tablet; Take 1 tablet (150 mcg) by mouth daily  Dispense: 90 tablet; Refill: 1    Return in about 3 months (around 10/21/2020) for Physical. And ancillary lab appointment now. .    Phone call duration:  5 minutes    SUKHI Swift CNP

## 2020-07-21 ENCOUNTER — VIRTUAL VISIT (OUTPATIENT)
Dept: FAMILY MEDICINE | Facility: CLINIC | Age: 50
End: 2020-07-21
Payer: COMMERCIAL

## 2020-07-21 DIAGNOSIS — E89.0 POSTSURGICAL HYPOTHYROIDISM: ICD-10-CM

## 2020-07-21 PROCEDURE — 99213 OFFICE O/P EST LOW 20 MIN: CPT | Mod: 95 | Performed by: NURSE PRACTITIONER

## 2020-07-21 RX ORDER — LEVOTHYROXINE SODIUM 150 UG/1
150 TABLET ORAL DAILY
Qty: 90 TABLET | Refills: 1 | Status: SHIPPED | OUTPATIENT
Start: 2020-07-21 | End: 2020-07-23

## 2020-07-21 RX ORDER — METOPROLOL SUCCINATE 50 MG/1
50 TABLET, EXTENDED RELEASE ORAL DAILY
Qty: 60 TABLET | Refills: 1 | Status: CANCELLED | OUTPATIENT
Start: 2020-07-21

## 2020-07-21 RX ORDER — HYDROCHLOROTHIAZIDE 25 MG/1
25 TABLET ORAL DAILY
Qty: 90 TABLET | Refills: 1 | Status: CANCELLED | OUTPATIENT
Start: 2020-07-21

## 2020-07-21 RX ORDER — LISINOPRIL 40 MG/1
40 TABLET ORAL DAILY
Qty: 90 TABLET | Refills: 1 | Status: CANCELLED | OUTPATIENT
Start: 2020-07-21

## 2020-07-22 DIAGNOSIS — E89.0 POSTSURGICAL HYPOTHYROIDISM: ICD-10-CM

## 2020-07-22 PROCEDURE — 84443 ASSAY THYROID STIM HORMONE: CPT | Performed by: FAMILY MEDICINE

## 2020-07-22 PROCEDURE — 84439 ASSAY OF FREE THYROXINE: CPT | Performed by: FAMILY MEDICINE

## 2020-07-22 PROCEDURE — 36415 COLL VENOUS BLD VENIPUNCTURE: CPT | Performed by: FAMILY MEDICINE

## 2020-07-23 ENCOUNTER — TELEPHONE (OUTPATIENT)
Dept: FAMILY MEDICINE | Facility: CLINIC | Age: 50
End: 2020-07-23

## 2020-07-23 DIAGNOSIS — E89.0 POSTSURGICAL HYPOTHYROIDISM: ICD-10-CM

## 2020-07-23 LAB
T4 FREE SERPL-MCNC: 0.72 NG/DL (ref 0.76–1.46)
TSH SERPL DL<=0.005 MIU/L-ACNC: 37.22 MU/L (ref 0.4–4)

## 2020-07-23 RX ORDER — LEVOTHYROXINE SODIUM 175 UG/1
175 TABLET ORAL DAILY
Qty: 30 TABLET | Refills: 3 | Status: SHIPPED | OUTPATIENT
Start: 2020-07-23 | End: 2020-12-10

## 2020-07-23 NOTE — TELEPHONE ENCOUNTER
Called patient but voicemail box was full, will try again later.     Please call patient. She is hypothyroid. This is likely contributing to her constipation. We need to increase the dose of her levothyroxine. I have sent an Rx to her [facility] pharmacy. Will need to recheck thyroid labs in 6 weeks. The order is in.    Hailee Enriquez RN

## 2020-07-27 NOTE — TELEPHONE ENCOUNTER
2nd attempt to reach.    Left message on answering machine for patient to call back to the RN hotline at [phone].    Luly Celis RN  [facility]

## 2020-07-28 DIAGNOSIS — I10 HTN, GOAL BELOW 140/90: ICD-10-CM

## 2020-07-28 NOTE — TELEPHONE ENCOUNTER
Unable to reach pt by phone to discuss results. Pt does not have mychart. Please mail results to pt. Thanks.    Luly Celis RN  [facility]

## 2020-07-29 RX ORDER — METOPROLOL SUCCINATE 50 MG/1
TABLET, EXTENDED RELEASE ORAL
Qty: 60 TABLET | Refills: 1 | Status: SHIPPED | OUTPATIENT
Start: 2020-07-29 | End: 2020-11-18

## 2020-07-29 NOTE — TELEPHONE ENCOUNTER
"Routing refill request to provider for review/approval because:  Labs out of range:  BP    Requested Prescriptions   Pending Prescriptions Disp Refills    metoprolol succinate ER (TOPROL-XL) 50 MG 24 hr tablet [Pharmacy Med Name: Metoprolol Succinate ER Oral Tablet Extended Release 24 Hour 50 MG] 60 tablet 0     Sig: TAKE ONE TABLET BY MOUTH ONE TIME DAILY       Beta-Blockers Protocol Failed - 7/28/2020  7:52 PM        Failed - Blood pressure under 140/90 in past 12 months     BP Readings from Last 3 Encounters:   01/29/20 (!) 152/80   01/16/20 (!) 158/70   09/03/19 (!) 150/90                 Passed - Patient is age 6 or older        Passed - Recent (12 mo) or future (30 days) visit within the authorizing provider's specialty     Patient has had an office visit with the authorizing provider or a provider within the authorizing providers department within the previous 12 mos or has a future within next 30 days. See \"Patient Info\" tab in inbasket, or \"Choose Columns\" in Meds & Orders section of the refill encounter.              Passed - Medication is active on med list           Leda Silver RN  "

## 2020-09-01 ENCOUNTER — TRANSFERRED RECORDS (OUTPATIENT)
Dept: HEALTH INFORMATION MANAGEMENT | Facility: CLINIC | Age: 50
End: 2020-09-01

## 2020-09-01 LAB — RETINOPATHY: NORMAL

## 2020-09-21 DIAGNOSIS — R80.9 TYPE 2 DIABETES MELLITUS WITH MICROALBUMINURIA, WITHOUT LONG-TERM CURRENT USE OF INSULIN (H): ICD-10-CM

## 2020-09-21 DIAGNOSIS — E11.29 TYPE 2 DIABETES MELLITUS WITH MICROALBUMINURIA, WITHOUT LONG-TERM CURRENT USE OF INSULIN (H): ICD-10-CM

## 2020-09-23 RX ORDER — BLOOD SUGAR DIAGNOSTIC
STRIP MISCELLANEOUS
Qty: 100 EACH | Refills: 0 | Status: SHIPPED | OUTPATIENT
Start: 2020-09-23 | End: 2021-09-02

## 2020-09-23 RX ORDER — LANCETS
EACH MISCELLANEOUS
Qty: 102 EACH | Refills: 0 | Status: SHIPPED | OUTPATIENT
Start: 2020-09-23 | End: 2021-04-16

## 2020-09-23 NOTE — TELEPHONE ENCOUNTER
Spoke to patient and made appointment for 9/30/2020.  Luly RUCKER CMA ([facility])

## 2020-09-29 DIAGNOSIS — I10 HTN, GOAL BELOW 140/90: Primary | ICD-10-CM

## 2020-09-29 DIAGNOSIS — G47.9 SLEEP DISTURBANCE: ICD-10-CM

## 2020-09-30 RX ORDER — TRAZODONE HYDROCHLORIDE 50 MG/1
50 TABLET, FILM COATED ORAL AT BEDTIME
Qty: 60 TABLET | Refills: 0 | Status: SHIPPED | OUTPATIENT
Start: 2020-09-30 | End: 2020-11-18

## 2020-09-30 NOTE — TELEPHONE ENCOUNTER
Routing refill request to provider for review/approval because:  Labs not current:  bpm  BP Readings from Last 3 Encounters:   01/29/20 (!) 152/80   01/16/20 (!) 158/70   09/03/19 (!) 150/90     Pt no showed appointment today.  Lexus Stoddard BSN, RN

## 2020-10-05 RX ORDER — LISINOPRIL 40 MG/1
TABLET ORAL
Qty: 30 TABLET | Refills: 0 | Status: SHIPPED | OUTPATIENT
Start: 2020-10-05 | End: 2020-11-18

## 2020-11-17 DIAGNOSIS — I10 BENIGN ESSENTIAL HYPERTENSION: ICD-10-CM

## 2020-11-17 DIAGNOSIS — I10 HTN, GOAL BELOW 140/90: ICD-10-CM

## 2020-11-17 NOTE — TELEPHONE ENCOUNTER
Reason for Call:  Other prescription    Detailed comments: needs refill of bp meds.  Pt not sure what they are. Caller informed that calls received after 3pm may not be returned same day.  [facility]ses [facility]    Phone Number Patient can be reached at: Home number on file [phone] (home)    Best Time: any    Can we leave a detailed message on this number? YES    Call taken on 11/17/2020 at 4:11 PM by Betsy Hammond

## 2020-11-18 DIAGNOSIS — G47.9 SLEEP DISTURBANCE: ICD-10-CM

## 2020-11-18 RX ORDER — TRAZODONE HYDROCHLORIDE 50 MG/1
50 TABLET, FILM COATED ORAL AT BEDTIME
Qty: 60 TABLET | Refills: 0 | Status: SHIPPED | OUTPATIENT
Start: 2020-11-18 | End: 2021-01-04

## 2020-11-18 RX ORDER — HYDROCHLOROTHIAZIDE 25 MG/1
25 TABLET ORAL DAILY
Qty: 30 TABLET | Refills: 0 | Status: SHIPPED | OUTPATIENT
Start: 2020-11-18 | End: 2020-12-10

## 2020-11-18 RX ORDER — METOPROLOL SUCCINATE 50 MG/1
50 TABLET, EXTENDED RELEASE ORAL DAILY
Qty: 30 TABLET | Refills: 0 | Status: SHIPPED | OUTPATIENT
Start: 2020-11-18 | End: 2020-12-10

## 2020-11-18 RX ORDER — LISINOPRIL 40 MG/1
40 TABLET ORAL DAILY
Qty: 30 TABLET | Refills: 0 | Status: SHIPPED | OUTPATIENT
Start: 2020-11-18 | End: 2020-12-10

## 2020-11-18 NOTE — TELEPHONE ENCOUNTER
Pt has no showed last three appointment's.  Pt does have appointment scheduled.  Lexus Stoddard BSN, RN

## 2020-12-10 ENCOUNTER — OFFICE VISIT (OUTPATIENT)
Dept: FAMILY MEDICINE | Facility: CLINIC | Age: 50
End: 2020-12-10
Payer: COMMERCIAL

## 2020-12-10 VITALS
WEIGHT: 164 LBS | TEMPERATURE: 98.3 F | BODY MASS INDEX: 27.29 KG/M2 | OXYGEN SATURATION: 100 % | HEART RATE: 77 BPM | SYSTOLIC BLOOD PRESSURE: 151 MMHG | DIASTOLIC BLOOD PRESSURE: 90 MMHG

## 2020-12-10 DIAGNOSIS — N18.30 TYPE 2 DIABETES MELLITUS WITH STAGE 3 CHRONIC KIDNEY DISEASE, WITHOUT LONG-TERM CURRENT USE OF INSULIN, UNSPECIFIED WHETHER STAGE 3A OR 3B CKD (H): ICD-10-CM

## 2020-12-10 DIAGNOSIS — E78.5 HYPERLIPIDEMIA LDL GOAL <100: ICD-10-CM

## 2020-12-10 DIAGNOSIS — E11.22 TYPE 2 DIABETES MELLITUS WITH STAGE 3 CHRONIC KIDNEY DISEASE, WITHOUT LONG-TERM CURRENT USE OF INSULIN, UNSPECIFIED WHETHER STAGE 3A OR 3B CKD (H): ICD-10-CM

## 2020-12-10 DIAGNOSIS — E89.0 POSTSURGICAL HYPOTHYROIDISM: ICD-10-CM

## 2020-12-10 DIAGNOSIS — I10 HTN, GOAL BELOW 140/90: Primary | ICD-10-CM

## 2020-12-10 PROCEDURE — 99214 OFFICE O/P EST MOD 30 MIN: CPT | Performed by: FAMILY MEDICINE

## 2020-12-10 RX ORDER — HYDROCHLOROTHIAZIDE 25 MG/1
25 TABLET ORAL DAILY
Qty: 30 TABLET | Refills: 0 | Status: SHIPPED | OUTPATIENT
Start: 2020-12-10 | End: 2021-01-04

## 2020-12-10 RX ORDER — LISINOPRIL 40 MG/1
40 TABLET ORAL DAILY
Qty: 30 TABLET | Refills: 0 | Status: SHIPPED | OUTPATIENT
Start: 2020-12-10 | End: 2021-01-04

## 2020-12-10 RX ORDER — LEVOTHYROXINE SODIUM 175 UG/1
175 TABLET ORAL DAILY
Qty: 30 TABLET | Refills: 0 | Status: SHIPPED | OUTPATIENT
Start: 2020-12-10 | End: 2021-01-04

## 2020-12-10 RX ORDER — METOPROLOL SUCCINATE 50 MG/1
50 TABLET, EXTENDED RELEASE ORAL DAILY
Qty: 30 TABLET | Refills: 0 | Status: SHIPPED | OUTPATIENT
Start: 2020-12-10 | End: 2021-01-04

## 2020-12-10 NOTE — PROGRESS NOTES
Subjective     Lay Solis is a 50 year old female who presents to clinic today for the following health issues:    HPI         Hypertension Follow-up      Do you check your blood pressure regularly outside of the clinic? Yes     Are you following a low salt diet? No    Are your blood pressures ever more than 140 on the top number (systolic) OR more   than 90 on the bottom number (diastolic), for example 140/90? Yes is out of BP medications. She didn't take any today.      How many servings of fruits and vegetables do you eat daily?  0-1    On average, how many sweetened beverages do you drink each day (Examples: soda, juice, sweet tea, etc.  Do NOT count diet or artificially sweetened beverages)?   2    How many days per week do you exercise enough to make your heart beat faster? 3 or less    How many minutes a day do you exercise enough to make your heart beat faster? 9 or less  How many days per week do you miss taking your medication? 2    What makes it hard for you to take your medications?  remembering to take     The patient is here for a blood pressure check.  She is supposed to be taking lisinopril 40 mg daily and HCTZ 25 mg daily, but it sounds like she forgets to take them a fair amount.  I believe she was also prescribed metoprolol as well, but she has not been taking that medication.  She has diet-controlled diabetes and is overdue for follow-up on that.  She is also overdue for follow-up on her hypothyroidism.  She had some thyroid labs done in July and was advised to follow-up in 6 weeks.  She is not fasting today.  I note that she is not on a statin.    Patient Active Problem List   Diagnosis     Anemia     History of thyroid cancer     Hypertension, benign     Postsurgical hypothyroidism     Thyroid cancer (H)     Type 2 diabetes mellitus with renal complication (H)     Current Outpatient Medications   Medication     diphenhydrAMINE (BENADRYL) 50 MG capsule     ferrous sulfate (FEROSUL) 325 (65  Fe) MG tablet     hydrochlorothiazide (HYDRODIURIL) 25 MG tablet     levothyroxine (SYNTHROID/LEVOTHROID) 175 MCG tablet     lisinopril (ZESTRIL) 40 MG tablet     traZODone (DESYREL) 50 MG tablet     blood glucose (ACCU-CHEK SMARTVIEW) test strip     blood glucose monitoring (ACCU-CHEK FASTCLIX) lancets     metoprolol succinate ER (TOPROL-XL) 50 MG 24 hr tablet     No current facility-administered medications for this visit.          Review of Systems   Constitutional, HEENT, cardiovascular, pulmonary, gi and gu systems are negative, except as otherwise noted.      Objective    BP (!) 151/90 (BP Location: Right arm, Patient Position: Sitting, Cuff Size: Adult Regular)   Pulse 77   Temp 98.3  F (36.8  C) (Oral)   Wt 74.4 kg (164 lb)   SpO2 100%   BMI 27.29 kg/m    Body mass index is 27.29 kg/m .  Physical Exam   GENERAL: healthy, alert and no distress    Previous lab results were reviewed.        Assessment & Plan       ICD-10-CM    1. HTN, goal below 140/90  I10 lisinopril (ZESTRIL) 40 MG tablet     hydrochlorothiazide (HYDRODIURIL) 25 MG tablet     metoprolol succinate ER (TOPROL-XL) 50 MG 24 hr tablet   2. Postsurgical hypothyroidism  E89.0 levothyroxine (SYNTHROID/LEVOTHROID) 175 MCG tablet   3. Type 2 diabetes mellitus with stage 3 chronic kidney disease, without long-term current use of insulin, unspecified whether stage 3a or 3b CKD (H)  E11.22     N18.30    4. Hyperlipidemia LDL goal <100  E78.5      Her blood pressure is not at goal, but it sounds like she has not been very compliant with her medications  I encouraged her to get back on the 3 different medications for blood pressure that she has been prescribed and I did refill those for her today  She is overdue for lab work, but is not fasting today  I advised her to take her medicines consistently over the next few weeks and then return to see her PCP in follow-up for some fasting lab work and recheck on her hypertension (and she was agreeable to  that)         Return in about 25 days (around 1/4/2021) for BP Recheck, diabetes recheck.    Timi Prater MD  [facility]

## 2021-01-04 ENCOUNTER — OFFICE VISIT (OUTPATIENT)
Dept: FAMILY MEDICINE | Facility: CLINIC | Age: 51
End: 2021-01-04
Payer: COMMERCIAL

## 2021-01-04 VITALS
OXYGEN SATURATION: 100 % | DIASTOLIC BLOOD PRESSURE: 84 MMHG | TEMPERATURE: 97.7 F | BODY MASS INDEX: 26 KG/M2 | SYSTOLIC BLOOD PRESSURE: 120 MMHG | WEIGHT: 161.8 LBS | HEIGHT: 66 IN | HEART RATE: 81 BPM

## 2021-01-04 DIAGNOSIS — Z13.220 SCREENING FOR HYPERLIPIDEMIA: ICD-10-CM

## 2021-01-04 DIAGNOSIS — G47.00 PERSISTENT INSOMNIA: ICD-10-CM

## 2021-01-04 DIAGNOSIS — N18.30 TYPE 2 DIABETES MELLITUS WITH STAGE 3 CHRONIC KIDNEY DISEASE, WITHOUT LONG-TERM CURRENT USE OF INSULIN, UNSPECIFIED WHETHER STAGE 3A OR 3B CKD (H): ICD-10-CM

## 2021-01-04 DIAGNOSIS — E89.0 POSTSURGICAL HYPOTHYROIDISM: ICD-10-CM

## 2021-01-04 DIAGNOSIS — Z11.59 NEED FOR HEPATITIS C SCREENING TEST: ICD-10-CM

## 2021-01-04 DIAGNOSIS — Z11.4 SCREENING FOR HIV (HUMAN IMMUNODEFICIENCY VIRUS): ICD-10-CM

## 2021-01-04 DIAGNOSIS — G47.9 SLEEP DISTURBANCE: ICD-10-CM

## 2021-01-04 DIAGNOSIS — I10 HTN, GOAL BELOW 140/90: Primary | ICD-10-CM

## 2021-01-04 DIAGNOSIS — E11.22 TYPE 2 DIABETES MELLITUS WITH STAGE 3 CHRONIC KIDNEY DISEASE, WITHOUT LONG-TERM CURRENT USE OF INSULIN, UNSPECIFIED WHETHER STAGE 3A OR 3B CKD (H): ICD-10-CM

## 2021-01-04 DIAGNOSIS — D50.9 IRON DEFICIENCY ANEMIA, UNSPECIFIED IRON DEFICIENCY ANEMIA TYPE: ICD-10-CM

## 2021-01-04 LAB
ALT SERPL W P-5'-P-CCNC: 31 U/L (ref 0–50)
ANION GAP SERPL CALCULATED.3IONS-SCNC: 6 MMOL/L (ref 3–14)
AST SERPL W P-5'-P-CCNC: 15 U/L (ref 0–45)
BUN SERPL-MCNC: 10 MG/DL (ref 7–30)
CALCIUM SERPL-MCNC: 9 MG/DL (ref 8.5–10.1)
CHLORIDE SERPL-SCNC: 99 MMOL/L (ref 94–109)
CHOLEST SERPL-MCNC: 199 MG/DL
CO2 SERPL-SCNC: 29 MMOL/L (ref 20–32)
CREAT SERPL-MCNC: 0.79 MG/DL (ref 0.52–1.04)
CREAT UR-MCNC: 48 MG/DL
ERYTHROCYTE [DISTWIDTH] IN BLOOD BY AUTOMATED COUNT: 16.9 % (ref 10–15)
GFR SERPL CREATININE-BSD FRML MDRD: 87 ML/MIN/{1.73_M2}
GLUCOSE SERPL-MCNC: 120 MG/DL (ref 70–99)
HBA1C MFR BLD: 6.4 % (ref 0–5.6)
HCT VFR BLD AUTO: 36.8 % (ref 35–47)
HCV AB SERPL QL IA: NONREACTIVE
HDLC SERPL-MCNC: 50 MG/DL
HGB BLD-MCNC: 11.8 G/DL (ref 11.7–15.7)
HIV 1+2 AB+HIV1 P24 AG SERPL QL IA: NONREACTIVE
LDLC SERPL CALC-MCNC: 127 MG/DL
MCH RBC QN AUTO: 26.2 PG (ref 26.5–33)
MCHC RBC AUTO-ENTMCNC: 32.1 G/DL (ref 31.5–36.5)
MCV RBC AUTO: 82 FL (ref 78–100)
MICROALBUMIN UR-MCNC: 30 MG/L
MICROALBUMIN/CREAT UR: 62.58 MG/G CR (ref 0–25)
NONHDLC SERPL-MCNC: 149 MG/DL
PLATELET # BLD AUTO: 301 10E9/L (ref 150–450)
POTASSIUM SERPL-SCNC: 3.4 MMOL/L (ref 3.4–5.3)
RBC # BLD AUTO: 4.51 10E12/L (ref 3.8–5.2)
SODIUM SERPL-SCNC: 134 MMOL/L (ref 133–144)
TRIGL SERPL-MCNC: 112 MG/DL
WBC # BLD AUTO: 7.1 10E9/L (ref 4–11)

## 2021-01-04 PROCEDURE — 86803 HEPATITIS C AB TEST: CPT | Performed by: FAMILY MEDICINE

## 2021-01-04 PROCEDURE — 80061 LIPID PANEL: CPT | Performed by: FAMILY MEDICINE

## 2021-01-04 PROCEDURE — 85027 COMPLETE CBC AUTOMATED: CPT | Performed by: FAMILY MEDICINE

## 2021-01-04 PROCEDURE — 87389 HIV-1 AG W/HIV-1&-2 AB AG IA: CPT | Performed by: FAMILY MEDICINE

## 2021-01-04 PROCEDURE — 83036 HEMOGLOBIN GLYCOSYLATED A1C: CPT | Performed by: FAMILY MEDICINE

## 2021-01-04 PROCEDURE — 36415 COLL VENOUS BLD VENIPUNCTURE: CPT | Performed by: FAMILY MEDICINE

## 2021-01-04 PROCEDURE — 84450 TRANSFERASE (AST) (SGOT): CPT | Performed by: FAMILY MEDICINE

## 2021-01-04 PROCEDURE — 99214 OFFICE O/P EST MOD 30 MIN: CPT | Performed by: FAMILY MEDICINE

## 2021-01-04 PROCEDURE — 80048 BASIC METABOLIC PNL TOTAL CA: CPT | Performed by: FAMILY MEDICINE

## 2021-01-04 PROCEDURE — 84460 ALANINE AMINO (ALT) (SGPT): CPT | Performed by: FAMILY MEDICINE

## 2021-01-04 PROCEDURE — 82043 UR ALBUMIN QUANTITATIVE: CPT | Performed by: FAMILY MEDICINE

## 2021-01-04 RX ORDER — DIPHENHYDRAMINE HCL 50 MG
50 CAPSULE ORAL EVERY 6 HOURS PRN
Qty: 24 CAPSULE | Refills: 1 | Status: SHIPPED | OUTPATIENT
Start: 2021-01-04 | End: 2022-06-01

## 2021-01-04 RX ORDER — LISINOPRIL 40 MG/1
40 TABLET ORAL DAILY
Qty: 30 TABLET | Refills: 5 | Status: SHIPPED | OUTPATIENT
Start: 2021-01-04 | End: 2021-09-02

## 2021-01-04 RX ORDER — METOPROLOL SUCCINATE 50 MG/1
50 TABLET, EXTENDED RELEASE ORAL DAILY
Qty: 30 TABLET | Refills: 5 | Status: SHIPPED | OUTPATIENT
Start: 2021-01-04 | End: 2021-09-02

## 2021-01-04 RX ORDER — TRAZODONE HYDROCHLORIDE 50 MG/1
50 TABLET, FILM COATED ORAL AT BEDTIME
Qty: 30 TABLET | Refills: 5 | Status: SHIPPED | OUTPATIENT
Start: 2021-01-04 | End: 2021-06-14

## 2021-01-04 RX ORDER — LEVOTHYROXINE SODIUM 175 UG/1
175 TABLET ORAL DAILY
Qty: 30 TABLET | Refills: 5 | Status: SHIPPED | OUTPATIENT
Start: 2021-01-04 | End: 2021-09-02

## 2021-01-04 RX ORDER — HYDROCHLOROTHIAZIDE 25 MG/1
25 TABLET ORAL DAILY
Qty: 30 TABLET | Refills: 5 | Status: SHIPPED | OUTPATIENT
Start: 2021-01-04 | End: 2021-09-02

## 2021-01-04 ASSESSMENT — MIFFLIN-ST. JEOR: SCORE: 1363.54

## 2021-01-04 NOTE — PROGRESS NOTES
"  Assessment & Plan       ICD-10-CM    1. HTN, goal below 140/90  I10 hydrochlorothiazide (HYDRODIURIL) 25 MG tablet     lisinopril (ZESTRIL) 40 MG tablet     metoprolol succinate ER (TOPROL-XL) 50 MG 24 hr tablet     diphenhydrAMINE (BENADRYL) 50 MG capsule   2. Type 2 diabetes mellitus with stage 3 chronic kidney disease, without long-term current use of insulin, unspecified whether stage 3a or 3b CKD (H)  E11.22 HEMOGLOBIN A1C    N18.30 BASIC METABOLIC PANEL     Lipid panel reflex to direct LDL Fasting     Albumin Random Urine Quantitative with Creat Ratio     ALT     AST   3. Persistent insomnia  G47.00 diphenhydrAMINE (BENADRYL) 50 MG capsule   4. Sleep disturbance  G47.9 traZODone (DESYREL) 50 MG tablet   5. Iron deficiency anemia, unspecified iron deficiency anemia type  D50.9 CBC with platelets   6. Postsurgical hypothyroidism  E89.0 levothyroxine (SYNTHROID/LEVOTHROID) 175 MCG tablet   7. Screening for HIV (human immunodeficiency virus)  Z11.4 HIV Antigen Antibody Combo   8. Need for hepatitis C screening test  Z11.59 Hepatitis C Screen Reflex to HCV RNA Quant and Genotype   9. Screening for hyperlipidemia  Z13.220      Her blood pressure is back under good control now  The rest of her vital signs look good as well  We will plan to continue her same medications as above  We will check fasting lab work today as above  We discussed cancer screening test and she was willing to get a mammogram scheduled, but she wanted to hold off on any colon cancer screening  She expressed a willingness to consider that at a later date       BMI:   Estimated body mass index is 26.47 kg/m  as calculated from the following:    Height as of this encounter: 1.665 m (5' 5.55\").    Weight as of this encounter: 73.4 kg (161 lb 12.8 oz).   Weight management plan: Discussed healthy diet and exercise guidelines    We will plan to have her follow-up with one of our female providers in a few months for a general physical and any " appropriate follow-up on the above items    Return in about 3 months (around 4/4/2021) for Physical Exam, Routine Visit.    Timi Prater MD  [facility]     Lay Solis is a 50 year old female who presents to clinic today for the following health issues:     HPI       Diabetes Follow-up    How often are you checking your blood sugar? One time daily  What time of day are you checking your blood sugars (select all that apply)?  After meals  Have you had any blood sugars above 200?  No  Have you had any blood sugars below 70?  No    What symptoms do you notice when your blood sugar is low?  None    What concerns do you have today about your diabetes? None     Do you have any of these symptoms? (Select all that apply)  Numbness in feet and Blurry vision    Have you had a diabetic eye exam in the last 12 months? No        BP Readings from Last 2 Encounters:   01/04/21 120/84   12/10/20 (!) 151/90     Hemoglobin A1C (%)   Date Value   09/03/2019 6.2 (H)   04/18/2019 6.2 (H)     LDL Cholesterol Calculated (mg/dL)   Date Value   09/03/2019 97   11/15/2018 98               Hypertension Follow-up      Do you check your blood pressure regularly outside of the clinic? Yes     Are you following a low salt diet? No    Are your blood pressures ever more than 140 on the top number (systolic) OR more   than 90 on the bottom number (diastolic), for example 140/90? Yes      How many servings of fruits and vegetables do you eat daily?  2-3    On average, how many sweetened beverages do you drink each day (Examples: soda, juice, sweet tea, etc.  Do NOT count diet or artificially sweetened beverages)?   2    How many days per week do you exercise enough to make your heart beat faster? 3 or less    How many minutes a day do you exercise enough to make your heart beat faster? 9 or less  How many days per week do you miss taking your medication? 1-2    What makes it hard for you to take your  "medications?  remembering to take    She apparently is back taking her medications again as below.  She got scheduled for this follow-up with me rather than one of her prior PCPs, but did come in fasting for this visit for lab work.    She is behind on various cancer screening tests including mammography and colon cancer screening.  She does have a mammogram ordered from one of her prior PCP visits.    Patient Active Problem List   Diagnosis     Anemia     History of thyroid cancer     Hyperlipidemia LDL goal <100     Hypertension, benign     Postsurgical hypothyroidism     Thyroid cancer (H)     Type 2 diabetes mellitus with renal complication (H)     Current Outpatient Medications   Medication     blood glucose (ACCU-CHEK SMARTVIEW) test strip     blood glucose monitoring (ACCU-CHEK FASTCLIX) lancets     diphenhydrAMINE (BENADRYL) 50 MG capsule     ferrous sulfate (FEROSUL) 325 (65 Fe) MG tablet     hydrochlorothiazide (HYDRODIURIL) 25 MG tablet     levothyroxine (SYNTHROID/LEVOTHROID) 175 MCG tablet     lisinopril (ZESTRIL) 40 MG tablet     metoprolol succinate ER (TOPROL-XL) 50 MG 24 hr tablet     traZODone (DESYREL) 50 MG tablet     No current facility-administered medications for this visit.          Review of Systems   Constitutional, HEENT, cardiovascular, pulmonary, gi and gu systems are negative, except as otherwise noted.      Objective    /84 (BP Location: Left arm, Patient Position: Sitting, Cuff Size: Adult Regular)   Pulse 81   Temp 97.7  F (36.5  C) (Oral)   Ht 1.665 m (5' 5.55\")   Wt 73.4 kg (161 lb 12.8 oz)   SpO2 100%   BMI 26.47 kg/m    Body mass index is 26.47 kg/m .  Physical Exam   GENERAL: alert, no distress and over weight    Previous lab results were reviewed.            "

## 2021-01-04 NOTE — LETTER
"January 5, 2021    Lay Solis  [address] [place] [id number]          Dear ,    We are writing to inform you of your test results.  Your laboratory tests show that your diabetes remains well controlled.  Your hepatitis C and HIV screening antibody tests were both normal/negative.  Your hemoglobin is back up into the normal range.  Liver tests are normal.  Electrolytes and kidney blood tests are within normal limits, but you still have some microalbumin in your urine, likely related to the diabetes.   Your LDL \"bad\" cholesterol is higher than desirable, and with your diabetes it would be especially important to keep that number low, so I would recommend starting a medicine to help lower that.  I therefore sent a prescription for atorvastatin 20 mg daily to your pharmacy to accomplish this.  Please add this once a day medication to your others that you are taking and try to take them all consistently as prescribed.       Resulted Orders   HIV Antigen Antibody Combo   Result Value Ref Range    HIV Antigen Antibody Combo Nonreactive NR^Nonreactive          Comment:      HIV-1 p24 Ag & HIV-1/HIV-2 Ab Not Detected   Hepatitis C Screen Reflex to HCV RNA Quant and Genotype   Result Value Ref Range    Hepatitis C Antibody Nonreactive NR^Nonreactive      Comment:      Assay performance characteristics have not been established for newborns,   infants, and children     HEMOGLOBIN A1C   Result Value Ref Range    Hemoglobin A1C 6.4 (H) 0 - 5.6 %      Comment:      Normal <5.7% Prediabetes 5.7-6.4%  Diabetes 6.5% or higher - adopted from ADA   consensus guidelines.     BASIC METABOLIC PANEL   Result Value Ref Range    Sodium 134 133 - 144 mmol/L    Potassium 3.4 3.4 - 5.3 mmol/L    Chloride 99 94 - 109 mmol/L    Carbon Dioxide 29 20 - 32 mmol/L    Anion Gap 6 3 - 14 mmol/L    Glucose 120 (H) 70 - 99 mg/dL      Comment:      Fasting specimen    Urea Nitrogen 10 7 - 30 mg/dL    Creatinine 0.79 0.52 - 1.04 mg/dL    " GFR Estimate 87 >60 mL/min/[1.73_m2]      Comment:      Non  GFR Calc  Starting 12/18/2018, serum creatinine based estimated GFR (eGFR) will be   calculated using the Chronic Kidney Disease Epidemiology Collaboration   (CKD-EPI) equation.      GFR Estimate If Black >90 >60 mL/min/[1.73_m2]      Comment:       GFR Calc  Starting 12/18/2018, serum creatinine based estimated GFR (eGFR) will be   calculated using the Chronic Kidney Disease Epidemiology Collaboration   (CKD-EPI) equation.      Calcium 9.0 8.5 - 10.1 mg/dL   Lipid panel reflex to direct LDL Fasting   Result Value Ref Range    Cholesterol 199 <200 mg/dL    Triglycerides 112 <150 mg/dL      Comment:      Fasting specimen    HDL Cholesterol 50 >49 mg/dL    LDL Cholesterol Calculated 127 (H) <100 mg/dL      Comment:      Above desirable:  100-129 mg/dl  Borderline High:  130-159 mg/dL  High:             160-189 mg/dL  Very high:       >189 mg/dl      Non HDL Cholesterol 149 (H) <130 mg/dL      Comment:      Above Desirable:  130-159 mg/dl  Borderline high:  160-189 mg/dl  High:             190-219 mg/dl  Very high:       >219 mg/dl     Albumin Random Urine Quantitative with Creat Ratio   Result Value Ref Range    Creatinine Urine 48 mg/dL    Albumin Urine mg/L 30 mg/L    Albumin Urine mg/g Cr 62.58 (H) 0 - 25 mg/g Cr   ALT   Result Value Ref Range    ALT 31 0 - 50 U/L   AST   Result Value Ref Range    AST 15 0 - 45 U/L   CBC with platelets   Result Value Ref Range    WBC 7.1 4.0 - 11.0 10e9/L    RBC Count 4.51 3.8 - 5.2 10e12/L    Hemoglobin 11.8 11.7 - 15.7 g/dL    Hematocrit 36.8 35.0 - 47.0 %    MCV 82 78 - 100 fl    MCH 26.2 (L) 26.5 - 33.0 pg    MCHC 32.1 31.5 - 36.5 g/dL    RDW 16.9 (H) 10.0 - 15.0 %    Platelet Count 301 150 - 450 10e9/L       If you have any questions or concerns, please call the clinic at the number listed above.       Sincerely,      Timi Prater MD

## 2021-01-05 ENCOUNTER — TELEPHONE (OUTPATIENT)
Dept: FAMILY MEDICINE | Facility: CLINIC | Age: 51
End: 2021-01-05

## 2021-01-05 DIAGNOSIS — E78.5 HYPERLIPIDEMIA LDL GOAL <100: Primary | ICD-10-CM

## 2021-01-05 RX ORDER — ATORVASTATIN CALCIUM 20 MG/1
20 TABLET, FILM COATED ORAL DAILY
Qty: 90 TABLET | Refills: 1 | Status: SHIPPED | OUTPATIENT
Start: 2021-01-05 | End: 2021-09-02

## 2021-01-05 NOTE — RESULT ENCOUNTER NOTE
"Lay,  Your laboratory tests show that your diabetes remains well controlled.  Your hepatitis C and HIV screening antibody tests were both normal/negative.  Your hemoglobin is back up into the normal range.  Liver tests are normal.  Electrolytes and kidney blood tests are within normal limits, but you still have some microalbumin in your urine, likely related to the diabetes.  Your LDL \"bad\" cholesterol is higher than desirable, and with your diabetes it would be especially important to keep that number low, so I would recommend starting a medicine to help lower that.  I therefore sent a prescription for atorvastatin 20 mg daily to your pharmacy to accomplish this.  Please add this once a day medication to your others that you are taking and try to take them all consistently as prescribed.    Timi Prater MD  "

## 2021-04-16 DIAGNOSIS — E11.29 TYPE 2 DIABETES MELLITUS WITH MICROALBUMINURIA, WITHOUT LONG-TERM CURRENT USE OF INSULIN (H): ICD-10-CM

## 2021-04-16 DIAGNOSIS — R80.9 TYPE 2 DIABETES MELLITUS WITH MICROALBUMINURIA, WITHOUT LONG-TERM CURRENT USE OF INSULIN (H): ICD-10-CM

## 2021-04-16 RX ORDER — LANCETS
EACH MISCELLANEOUS
Qty: 102 EACH | Refills: 0 | Status: SHIPPED | OUTPATIENT
Start: 2021-04-16 | End: 2021-09-02

## 2021-06-13 DIAGNOSIS — G47.9 SLEEP DISTURBANCE: ICD-10-CM

## 2021-06-14 RX ORDER — TRAZODONE HYDROCHLORIDE 50 MG/1
TABLET, FILM COATED ORAL
Qty: 30 TABLET | Refills: 0 | Status: SHIPPED | OUTPATIENT
Start: 2021-06-14 | End: 2021-07-27

## 2021-07-26 DIAGNOSIS — G47.9 SLEEP DISTURBANCE: ICD-10-CM

## 2021-07-27 RX ORDER — TRAZODONE HYDROCHLORIDE 50 MG/1
TABLET, FILM COATED ORAL
Qty: 30 TABLET | Refills: 0 | Status: SHIPPED | OUTPATIENT
Start: 2021-07-27 | End: 2021-09-02

## 2021-09-01 ENCOUNTER — TRANSFERRED RECORDS (OUTPATIENT)
Dept: HEALTH INFORMATION MANAGEMENT | Facility: CLINIC | Age: 51
End: 2021-09-01

## 2021-09-01 DIAGNOSIS — G47.9 SLEEP DISTURBANCE: ICD-10-CM

## 2021-09-01 LAB — RETINOPATHY: NEGATIVE

## 2021-09-02 ENCOUNTER — OFFICE VISIT (OUTPATIENT)
Dept: FAMILY MEDICINE | Facility: CLINIC | Age: 51
End: 2021-09-02
Payer: COMMERCIAL

## 2021-09-02 VITALS
HEART RATE: 75 BPM | SYSTOLIC BLOOD PRESSURE: 140 MMHG | OXYGEN SATURATION: 98 % | DIASTOLIC BLOOD PRESSURE: 70 MMHG | WEIGHT: 168.6 LBS | TEMPERATURE: 97.6 F | BODY MASS INDEX: 27.59 KG/M2

## 2021-09-02 DIAGNOSIS — R80.9 TYPE 2 DIABETES MELLITUS WITH MICROALBUMINURIA, WITHOUT LONG-TERM CURRENT USE OF INSULIN (H): Primary | ICD-10-CM

## 2021-09-02 DIAGNOSIS — Z12.31 ENCOUNTER FOR SCREENING MAMMOGRAM FOR BREAST CANCER: ICD-10-CM

## 2021-09-02 DIAGNOSIS — E89.0 POSTSURGICAL HYPOTHYROIDISM: ICD-10-CM

## 2021-09-02 DIAGNOSIS — G47.00 PERSISTENT INSOMNIA: ICD-10-CM

## 2021-09-02 DIAGNOSIS — E78.5 HYPERLIPIDEMIA LDL GOAL <100: ICD-10-CM

## 2021-09-02 DIAGNOSIS — D50.9 IRON DEFICIENCY ANEMIA, UNSPECIFIED IRON DEFICIENCY ANEMIA TYPE: ICD-10-CM

## 2021-09-02 DIAGNOSIS — G47.9 SLEEP DISTURBANCE: ICD-10-CM

## 2021-09-02 DIAGNOSIS — E11.29 TYPE 2 DIABETES MELLITUS WITH MICROALBUMINURIA, WITHOUT LONG-TERM CURRENT USE OF INSULIN (H): Primary | ICD-10-CM

## 2021-09-02 DIAGNOSIS — Z12.11 SCREEN FOR COLON CANCER: ICD-10-CM

## 2021-09-02 DIAGNOSIS — I10 HTN, GOAL BELOW 140/90: ICD-10-CM

## 2021-09-02 LAB
CREAT UR-MCNC: 29 MG/DL
HBA1C MFR BLD: 7.2 % (ref 0–5.6)
MICROALBUMIN UR-MCNC: 33 MG/L
MICROALBUMIN/CREAT UR: 113.79 MG/G CR (ref 0–25)
TSH SERPL DL<=0.005 MIU/L-ACNC: 1.19 MU/L (ref 0.4–4)

## 2021-09-02 PROCEDURE — 82043 UR ALBUMIN QUANTITATIVE: CPT | Performed by: PHYSICIAN ASSISTANT

## 2021-09-02 PROCEDURE — 84443 ASSAY THYROID STIM HORMONE: CPT | Performed by: PHYSICIAN ASSISTANT

## 2021-09-02 PROCEDURE — 99207 PR FOOT EXAM NO CHARGE: CPT | Performed by: PHYSICIAN ASSISTANT

## 2021-09-02 PROCEDURE — 83036 HEMOGLOBIN GLYCOSYLATED A1C: CPT | Performed by: PHYSICIAN ASSISTANT

## 2021-09-02 PROCEDURE — 36415 COLL VENOUS BLD VENIPUNCTURE: CPT | Performed by: PHYSICIAN ASSISTANT

## 2021-09-02 PROCEDURE — 99214 OFFICE O/P EST MOD 30 MIN: CPT | Performed by: PHYSICIAN ASSISTANT

## 2021-09-02 RX ORDER — TRAZODONE HYDROCHLORIDE 50 MG/1
TABLET, FILM COATED ORAL
Qty: 60 TABLET | Refills: 3 | Status: SHIPPED | OUTPATIENT
Start: 2021-09-02 | End: 2022-03-24

## 2021-09-02 RX ORDER — BLOOD SUGAR DIAGNOSTIC
STRIP MISCELLANEOUS
Qty: 100 STRIP | Refills: 1 | Status: SHIPPED | OUTPATIENT
Start: 2021-09-02 | End: 2022-06-01

## 2021-09-02 RX ORDER — LANCETS
EACH MISCELLANEOUS
Qty: 102 EACH | Refills: 0 | Status: SHIPPED | OUTPATIENT
Start: 2021-09-02 | End: 2021-10-22

## 2021-09-02 RX ORDER — LEVOTHYROXINE SODIUM 175 UG/1
175 TABLET ORAL DAILY
Qty: 30 TABLET | Refills: 5 | Status: SHIPPED | OUTPATIENT
Start: 2021-09-02 | End: 2022-06-01

## 2021-09-02 RX ORDER — DIPHENHYDRAMINE HCL 50 MG
50 CAPSULE ORAL EVERY 6 HOURS PRN
Qty: 24 CAPSULE | Refills: 1 | Status: CANCELLED | OUTPATIENT
Start: 2021-09-02

## 2021-09-02 RX ORDER — HYDROCHLOROTHIAZIDE 25 MG/1
25 TABLET ORAL DAILY
Qty: 30 TABLET | Refills: 5 | Status: SHIPPED | OUTPATIENT
Start: 2021-09-02 | End: 2022-06-01

## 2021-09-02 RX ORDER — METOPROLOL SUCCINATE 50 MG/1
50 TABLET, EXTENDED RELEASE ORAL DAILY
Qty: 30 TABLET | Refills: 5 | Status: SHIPPED | OUTPATIENT
Start: 2021-09-02 | End: 2022-06-01

## 2021-09-02 RX ORDER — FERROUS SULFATE 325(65) MG
TABLET ORAL
Qty: 30 TABLET | Refills: 1 | Status: SHIPPED | OUTPATIENT
Start: 2021-09-02 | End: 2023-03-14

## 2021-09-02 RX ORDER — LISINOPRIL 40 MG/1
40 TABLET ORAL DAILY
Qty: 30 TABLET | Refills: 5 | Status: SHIPPED | OUTPATIENT
Start: 2021-09-02 | End: 2022-06-01

## 2021-09-02 RX ORDER — ATORVASTATIN CALCIUM 20 MG/1
20 TABLET, FILM COATED ORAL DAILY
Qty: 90 TABLET | Refills: 1 | Status: SHIPPED | OUTPATIENT
Start: 2021-09-02 | End: 2022-06-01

## 2021-09-02 NOTE — PROGRESS NOTES
"    Assessment & Plan     HTN, goal below 140/90  - lisinopril (ZESTRIL) 40 MG tablet; Take 1 tablet (40 mg) by mouth daily  - hydrochlorothiazide (HYDRODIURIL) 25 MG tablet; Take 1 tablet (25 mg) by mouth daily  - metoprolol succinate ER (TOPROL-XL) 50 MG 24 hr tablet; Take 1 tablet (50 mg) by mouth daily    Postsurgical hypothyroidism  - levothyroxine (SYNTHROID/LEVOTHROID) 175 MCG tablet; Take 1 tablet (175 mcg) by mouth daily  - TSH with free T4 reflex; Future    Iron deficiency anemia, unspecified iron deficiency anemia type  - ferrous sulfate (FEROSUL) 325 (65 Fe) MG tablet; TAKE 1 TABLET BY MOUTH ONCE EVERY OTHER DAY    Type 2 diabetes mellitus with microalbuminuria, without long-term current use of insulin (H)  - blood glucose (ACCU-CHEK SMARTVIEW) test strip; TEST BLOOD SUGAR ONCE DAILY OR AS DIRECTED  - blood glucose monitoring (ACCU-CHEK FASTCLIX) lancets; Use to test blood sugar 2 times daily or as directed.  - HEMOGLOBIN A1C; Future  - Albumin Random Urine Quantitative with Creat Ratio; Future  - FOOT EXAM    Sleep disturbance  - traZODone (DESYREL) 50 MG tablet; TAKE ONE TABLET BY MOUTH AT BEDTIME    Hyperlipidemia LDL goal <100  - atorvastatin (LIPITOR) 20 MG tablet; Take 1 tablet (20 mg) by mouth daily    Persistent insomnia    Screen for colon cancer  - Adult Gastro Ref - Procedure Only; Future    Encounter for screening mammogram for breast cancer  - MA SCREENING DIGITAL BILAT - Future  (s+30); Future    BMI:   Estimated body mass index is 27.59 kg/m  as calculated from the following:    Height as of 1/4/21: 1.665 m (5' 5.55\").    Weight as of this encounter: 76.5 kg (168 lb 9.6 oz).   Weight management plan: Discussed healthy diet and exercise guidelines        Return in about 6 months (around 3/2/2022) for Follow up.    Bubba Singleton PA-C  [facility] LILA Chun is a 51 year old who presents for the following health issues  accompanied by her self:    HPI "     Diabetes Follow-up    How often are you checking your blood sugar? One time daily  What time of day are you checking your blood sugars (select all that apply)?  Before and after meals  Have you had any blood sugars above 200?  No  Have you had any blood sugars below 70?  No    What symptoms do you notice when your blood sugar is low?  None    What concerns do you have today about your diabetes? None     Do you have any of these symptoms? (Select all that apply)  No numbness or tingling in feet.  No redness, sores or blisters on feet.  No complaints of excessive thirst.  No reports of blurry vision.  No significant changes to weight.    Have you had a diabetic eye exam in the last 12 months? Yes- Date of last eye exam: 09/01/2021,  [facility]        Hyperlipidemia Follow-Up      Are you regularly taking any medication or supplement to lower your cholesterol?   No    Are you having muscle aches or other side effects that you think could be caused by your cholesterol lowering medication?  No    Hypertension Follow-up      Do you check your blood pressure regularly outside of the clinic? Yes     Are you following a low salt diet? No    Are your blood pressures ever more than 140 on the top number (systolic) OR more   than 90 on the bottom number (diastolic), for example 140/90? No    BP Readings from Last 2 Encounters:   09/02/21 (!) 140/70   01/04/21 120/84     Hemoglobin A1C (%)   Date Value   01/04/2021 6.4 (H)   09/03/2019 6.2 (H)     LDL Cholesterol Calculated (mg/dL)   Date Value   01/04/2021 127 (H)   09/03/2019 97         How many servings of fruits and vegetables do you eat daily?  2-3    On average, how many sweetened beverages do you drink each day (Examples: soda, juice, sweet tea, etc.  Do NOT count diet or artificially sweetened beverages)?   0    How many days per week do you exercise enough to make your heart beat faster? 3 or less    How many minutes a day do you exercise enough to  make your heart beat faster? 60 or more    How many days per week do you miss taking your medication? 0      Review of Systems   Constitutional, HEENT, cardiovascular, pulmonary, gi and gu systems are negative, except as otherwise noted.      Objective    BP (!) 140/70   Pulse 75   Temp 97.6  F (36.4  C) (Oral)   Wt 76.5 kg (168 lb 9.6 oz)   SpO2 98%   BMI 27.59 kg/m    Body mass index is 27.59 kg/m .  Physical Exam   GENERAL: healthy, alert and no distress  NECK: no adenopathy, no asymmetry, masses, or scars and thyroid normal to palpation  RESP: lungs clear to auscultation - no rales, rhonchi or wheezes  CV: regular rate and rhythm, normal S1 S2, no S3 or S4, no murmur, click or rub, no peripheral edema and peripheral pulses strong  ABDOMEN: soft, nontender, no hepatosplenomegaly, no masses and bowel sounds normal  MS: no gross musculoskeletal defects noted, no edema  Diabetic foot exam: normal DP and PT pulses, no trophic changes or ulcerative lesions and normal monofilament exam

## 2021-09-03 ENCOUNTER — TELEPHONE (OUTPATIENT)
Dept: FAMILY MEDICINE | Facility: CLINIC | Age: 51
End: 2021-09-03
Payer: COMMERCIAL

## 2021-09-03 RX ORDER — TRAZODONE HYDROCHLORIDE 50 MG/1
TABLET, FILM COATED ORAL
Qty: 90 TABLET | Refills: 1 | OUTPATIENT
Start: 2021-09-03

## 2021-09-03 NOTE — RESULT ENCOUNTER NOTE
Worsening diabetes control.  Follow up per Diabetes educator recommendations.  They should be contacting you soon for scheduling.  Rosalinda SANTOS

## 2021-09-03 NOTE — TELEPHONE ENCOUNTER
Attempted to call pt at home. This was the first attempt at calling and detailed message left on identified voice message with Bubba's result note.      Bubba Singleton PA-C   9/3/2021 12:39 PM CDT       Worsening diabetes control.  Follow up per Diabetes educator recommendations.  They should be contacting you soon for scheduling.  Rosalinda SANTOS      Pt also informed to call clinic with any questions or concerns at [phone].    Nelly REYES RN, BSN  [facility]

## 2021-09-08 NOTE — TELEPHONE ENCOUNTER
Pt was given provider's message as written. Pt declined to see diabetic educator. States she likes sweets and tea and needs to stop eating sweets. She also needs to exercise. She was also prescribed medications for diabetes that she never took, but she can take them now. States after that will recheck. JAIRO Celis RN  [facility]

## 2022-01-10 ENCOUNTER — OFFICE VISIT (OUTPATIENT)
Dept: FAMILY MEDICINE | Facility: CLINIC | Age: 52
End: 2022-01-10
Payer: COMMERCIAL

## 2022-01-10 ENCOUNTER — ANCILLARY PROCEDURE (OUTPATIENT)
Dept: GENERAL RADIOLOGY | Facility: CLINIC | Age: 52
End: 2022-01-10
Attending: FAMILY MEDICINE
Payer: COMMERCIAL

## 2022-01-10 VITALS
BODY MASS INDEX: 26.93 KG/M2 | TEMPERATURE: 97.4 F | DIASTOLIC BLOOD PRESSURE: 85 MMHG | SYSTOLIC BLOOD PRESSURE: 142 MMHG | HEIGHT: 67 IN | HEART RATE: 70 BPM | RESPIRATION RATE: 16 BRPM | WEIGHT: 171.6 LBS | OXYGEN SATURATION: 93 %

## 2022-01-10 DIAGNOSIS — M79.89 SWELLING OF LOWER EXTREMITY: ICD-10-CM

## 2022-01-10 DIAGNOSIS — Z13.220 SCREENING FOR HYPERLIPIDEMIA: ICD-10-CM

## 2022-01-10 DIAGNOSIS — M25.562 ACUTE PAIN OF LEFT KNEE: Primary | ICD-10-CM

## 2022-01-10 DIAGNOSIS — Z12.11 SCREEN FOR COLON CANCER: ICD-10-CM

## 2022-01-10 DIAGNOSIS — I10 HTN, GOAL BELOW 140/90: ICD-10-CM

## 2022-01-10 PROBLEM — N18.1 CHRONIC KIDNEY DISEASE, STAGE 1: Status: ACTIVE | Noted: 2022-01-10

## 2022-01-10 PROCEDURE — 99213 OFFICE O/P EST LOW 20 MIN: CPT | Performed by: FAMILY MEDICINE

## 2022-01-10 PROCEDURE — 73562 X-RAY EXAM OF KNEE 3: CPT | Mod: LT | Performed by: RADIOLOGY

## 2022-01-10 ASSESSMENT — MIFFLIN-ST. JEOR: SCORE: 1418.61

## 2022-01-10 NOTE — PROGRESS NOTES
Assessment & Plan     Acute pain of left knee  Pain consistent with arthritis as confirmed by xray.  Discussed the differential diagnosis of arthralgia/artritis.  Will check some labs and recommended Tylenol and Glucosamine/Chondroiten as the safest regimen for pain.  May add ibuprofen for exacerbations.  At this point I think the most likely cause is degenerative.    - XR Knee Left 3 Views    Swelling of lower extremity: Rt   Been going on for a while: differentials; DVT, venous obstruction (lesion in groin or pelvis); no had any colon/pelvic exam or PAP smear  - US Lower Extremity Venous Duplex Right; Future    HTN, goal below 140/90    BP slightly above goal. Reports taking medications regularly. Possible due to pain. Will recheck in 1 week if still high will adjust medications.      Return in about 1 week (around 1/17/2022) for Follow up for BP recheck with ancillary.    Ronen Reeves MD  [facility] LILA Chun is a 51 year old who presents for the following health issues   HPI   Chief Complaint   Patient presents with     Knee Pain     Patient is here today for both legs she states sometimes her legs are swollen and she has knee pain for about two months, tightness feeling sometimes      Lt Knee:   Was walking and felt like knee gave way   Initially was swollen., improved but continues to discomfort with squatting and feels tightness in muscle   Also swell after standing or walking for long    Rt Knee:   Is okay but has swelling of leg all the time.   Swelling worse after exercise.    HTN:   Possibly due to pain   BP Readings from Last 6 Encounters:   01/10/22 (!) 142/85   09/02/21 (!) 140/70   01/04/21 120/84   12/10/20 (!) 151/90   01/29/20 (!) 152/80   01/16/20 (!) 158/70     Review of Systems   Constitutional, HEENT, cardiovascular, pulmonary, gi and gu systems are negative, except as otherwise noted.      Objective    BP (!) 142/85 (BP Location: Right arm, Cuff  "Size: Adult Regular)   Pulse 70   Temp 97.4  F (36.3  C) (Oral)   Resp 16   Ht 1.69 m (5' 6.54\")   Wt 77.8 kg (171 lb 9.6 oz)   SpO2 93%   BMI 27.25 kg/m    Body mass index is 27.25 kg/m .  Physical Exam   GENERAL: healthy, alert and no distress  RESP: lungs clear to auscultation - no rales, rhonchi or wheezes  CV: regular rate and rhythm, no murmur, click or rub,  MS: Rt; pitting leg edema         Lt Knee: tenderness along joint line    "

## 2022-01-17 ENCOUNTER — ANCILLARY PROCEDURE (OUTPATIENT)
Dept: ULTRASOUND IMAGING | Facility: CLINIC | Age: 52
End: 2022-01-17
Attending: FAMILY MEDICINE
Payer: COMMERCIAL

## 2022-01-17 DIAGNOSIS — M79.89 SWELLING OF LOWER EXTREMITY: ICD-10-CM

## 2022-01-17 PROCEDURE — 93971 EXTREMITY STUDY: CPT | Mod: RT | Performed by: RADIOLOGY

## 2022-03-08 ENCOUNTER — NURSE TRIAGE (OUTPATIENT)
Dept: FAMILY MEDICINE | Facility: CLINIC | Age: 52
End: 2022-03-08
Payer: COMMERCIAL

## 2022-03-08 NOTE — TELEPHONE ENCOUNTER
"Received call from patient. She is calling to request an appointment to address hyperglycemia. She states that her blood sugar is over 500 right now. She had some confusion yesterday and dizziness today along with slight difficulty breathing. The difficulty breathing has improved, she is currently not in distress. She was advised, per RN triage protocol, to go to the emergency room for care. She agrees with plan. Writer inquired about having someone else drive her and she states her  is not home right now, she reports wanting to drive herself once her car is done being repaired in the [employer] shop. Writer did state it would be safer to get a ride (or 911) but she declined.     Reason for Disposition    Blood glucose > 240 mg/dL (13.3 mmol/L) and rapid breathing    Additional Information    Negative: Unconscious or difficult to awaken    Negative: Acting confused (e.g., disoriented, slurred speech)    Negative: Very weak (can't stand)    Negative: Sounds like a life-threatening emergency to the triager    Negative: Vomiting and signs of dehydration (e.g., very dry mouth, lightheaded, dark urine)    Answer Assessment - Initial Assessment Questions  1. BLOOD GLUCOSE: \"What is your blood glucose level?\"       500   2. ONSET: \"When did you check the blood glucose?\"      This morning   3. USUAL RANGE: \"What is your glucose level usually?\" (e.g., usual fasting morning value, usual evening value)      Normally 140, 150   4. KETONES: \"Do you check for ketones (urine or blood test strips)?\" If yes, ask: \"What does the test show now?\"       N/A  5. TYPE 1 or 2:  \"Do you know what type of diabetes you have?\"  (e.g., Type 1, Type 2, Gestational; doesn't know)       Type 2 diabetes   6. INSULIN: \"Do you take insulin?\" \"What type of insulin(s) do you use? What is the mode of delivery? (syringe, pen; injection or pump)?\"       No  7. DIABETES PILLS: \"Do you take any pills for your diabetes?\" If yes, ask: \"Have you missed taking " "any pills recently?\"      No  8. OTHER SYMPTOMS: \"Do you have any symptoms?\" (e.g., fever, frequent urination, difficulty breathing, dizziness, weakness, vomiting)      Yesterday difficulty breathing  9. PREGNANCY: \"Is there any chance you are pregnant?\" \"When was your last menstrual period?\"      No    Protocols used: DIABETES - HIGH BLOOD SUGAR-A-OH    JOEL Walton RN  [facility], [place]  "

## 2022-03-22 DIAGNOSIS — G47.9 SLEEP DISTURBANCE: ICD-10-CM

## 2022-03-24 RX ORDER — TRAZODONE HYDROCHLORIDE 50 MG/1
TABLET, FILM COATED ORAL
Qty: 30 TABLET | Refills: 0 | Status: SHIPPED | OUTPATIENT
Start: 2022-03-24 | End: 2022-05-03

## 2022-04-12 ENCOUNTER — TELEPHONE (OUTPATIENT)
Dept: FAMILY MEDICINE | Facility: CLINIC | Age: 52
End: 2022-04-12

## 2022-04-12 ENCOUNTER — OFFICE VISIT (OUTPATIENT)
Dept: FAMILY MEDICINE | Facility: CLINIC | Age: 52
End: 2022-04-12
Payer: COMMERCIAL

## 2022-04-12 VITALS
HEART RATE: 88 BPM | OXYGEN SATURATION: 98 % | SYSTOLIC BLOOD PRESSURE: 130 MMHG | WEIGHT: 171 LBS | BODY MASS INDEX: 27.16 KG/M2 | DIASTOLIC BLOOD PRESSURE: 80 MMHG | TEMPERATURE: 97.3 F

## 2022-04-12 DIAGNOSIS — Z12.11 SCREEN FOR COLON CANCER: ICD-10-CM

## 2022-04-12 DIAGNOSIS — E11.29 TYPE 2 DIABETES MELLITUS WITH MICROALBUMINURIA, WITHOUT LONG-TERM CURRENT USE OF INSULIN (H): Primary | ICD-10-CM

## 2022-04-12 DIAGNOSIS — Z13.220 SCREENING FOR HYPERLIPIDEMIA: ICD-10-CM

## 2022-04-12 DIAGNOSIS — R80.9 TYPE 2 DIABETES MELLITUS WITH MICROALBUMINURIA, WITHOUT LONG-TERM CURRENT USE OF INSULIN (H): Primary | ICD-10-CM

## 2022-04-12 LAB
ANION GAP SERPL CALCULATED.3IONS-SCNC: 8 MMOL/L (ref 3–14)
BUN SERPL-MCNC: 18 MG/DL (ref 7–30)
CALCIUM SERPL-MCNC: 9 MG/DL (ref 8.5–10.1)
CHLORIDE BLD-SCNC: 100 MMOL/L (ref 94–109)
CHOLEST SERPL-MCNC: 195 MG/DL
CO2 SERPL-SCNC: 28 MMOL/L (ref 20–32)
CREAT SERPL-MCNC: 0.76 MG/DL (ref 0.52–1.04)
CREAT UR-MCNC: 77 MG/DL
FASTING STATUS PATIENT QL REPORTED: YES
GFR SERPL CREATININE-BSD FRML MDRD: >90 ML/MIN/1.73M2
GLUCOSE BLD-MCNC: 401 MG/DL (ref 70–99)
HBA1C MFR BLD: 10.9 % (ref 0–5.6)
HDLC SERPL-MCNC: 46 MG/DL
LDLC SERPL CALC-MCNC: 116 MG/DL
MICROALBUMIN UR-MCNC: 247 MG/L
MICROALBUMIN/CREAT UR: 320.78 MG/G CR (ref 0–25)
NONHDLC SERPL-MCNC: 149 MG/DL
POTASSIUM BLD-SCNC: 3.6 MMOL/L (ref 3.4–5.3)
SODIUM SERPL-SCNC: 136 MMOL/L (ref 133–144)
TRIGL SERPL-MCNC: 165 MG/DL

## 2022-04-12 PROCEDURE — 82043 UR ALBUMIN QUANTITATIVE: CPT | Performed by: PHYSICIAN ASSISTANT

## 2022-04-12 PROCEDURE — 83036 HEMOGLOBIN GLYCOSYLATED A1C: CPT | Performed by: PHYSICIAN ASSISTANT

## 2022-04-12 PROCEDURE — 36415 COLL VENOUS BLD VENIPUNCTURE: CPT | Performed by: PHYSICIAN ASSISTANT

## 2022-04-12 PROCEDURE — 99214 OFFICE O/P EST MOD 30 MIN: CPT | Performed by: PHYSICIAN ASSISTANT

## 2022-04-12 PROCEDURE — 80061 LIPID PANEL: CPT | Performed by: PHYSICIAN ASSISTANT

## 2022-04-12 PROCEDURE — 80048 BASIC METABOLIC PNL TOTAL CA: CPT | Performed by: PHYSICIAN ASSISTANT

## 2022-04-12 RX ORDER — METFORMIN HCL 500 MG
500 TABLET, EXTENDED RELEASE 24 HR ORAL
Qty: 90 TABLET | Refills: 1 | Status: SHIPPED | OUTPATIENT
Start: 2022-04-12 | End: 2022-06-01

## 2022-04-12 NOTE — PROGRESS NOTES
"  Assessment & Plan     Type 2 diabetes mellitus with microalbuminuria, without long-term current use of insulin (H)  - BASIC METABOLIC PANEL; Future  - Lipid panel reflex to direct LDL Fasting; Future  - HEMOGLOBIN A1C; Future  - Albumin Random Urine Quantitative with Creat Ratio; Future  - metFORMIN (GLUCOPHAGE-XR) 500 MG 24 hr tablet; Take 1 tablet (500 mg) by mouth daily (with dinner)  - [facility] Adult Diabetes Educator Referral; Future  - HEMOGLOBIN A1C  - Albumin Random Urine Quantitative with Creat Ratio  - Lipid panel reflex to direct LDL Fasting  - BASIC METABOLIC PANEL    Screen for colon cancer  - Adult Gastro Ref - Procedure Only; Future    Screening for hyperlipidemia  - Lipid panel reflex to direct LDL Fasting; Future  - Lipid panel reflex to direct LDL Fasting       BMI:   Estimated body mass index is 27.16 kg/m  as calculated from the following:    Height as of 1/10/22: 1.69 m (5' 6.54\").    Weight as of this encounter: 77.6 kg (171 lb).   Weight management plan: Patient referred to endocrine and/or weight management specialty        No follow-ups on file.    Bubba Singleton PA-C  [facility] LILA Chun is a 51 year old who presents for the following health issues  accompanied by her self.    History of Present Illness       Diabetes:   She presents for follow up of diabetes.  She is checking home blood glucose one time daily. She checks blood glucose before and after meals.  Blood glucose is sometimes over 200 and never under 70. She is aware of hypoglycemia symptoms including shakiness, dizziness, weakness and lethargy. She is concerned about blood sugar frequently over 200. She is having burning in feet and weight gain.         She eats 0-1 servings of fruits and vegetables daily.She consumes 0 sweetened beverage(s) daily.She exercises with enough effort to increase her heart rate 9 or less minutes per day.  She exercises with enough effort to increase her heart " rate 3 or less days per week.   She is taking medications regularly.             BP Readings from Last 2 Encounters:   04/12/22 130/80   01/10/22 (!) 142/85     Hemoglobin A1C POCT (%)   Date Value   01/04/2021 6.4 (H)   09/03/2019 6.2 (H)     Hemoglobin A1C (%)   Date Value   04/12/2022 10.9 (H)   09/02/2021 7.2 (H)     LDL Cholesterol Calculated (mg/dL)   Date Value   01/04/2021 127 (H)   09/03/2019 97               Reviewed home glucometer results with Patient by her report.  She has noticed some readings in the 300s as of late.  Amenable to Metformin start and DM education consultation.     Review of Systems   Constitutional, HEENT, cardiovascular, pulmonary, gi and gu systems are negative, except as otherwise noted.      Objective    /80 (BP Location: Left arm, Patient Position: Chair, Cuff Size: Adult Regular)   Pulse 88   Temp 97.3  F (36.3  C) (Oral)   Wt 77.6 kg (171 lb)   SpO2 98%   BMI 27.16 kg/m    Body mass index is 27.16 kg/m .  Physical Exam   GENERAL: healthy, alert and no distress  NECK: no adenopathy, no asymmetry, masses, or scars and thyroid normal to palpation  RESP: lungs clear to auscultation - no rales, rhonchi or wheezes  CV: regular rate and rhythm, normal S1 S2, no S3 or S4, no murmur, click or rub, no peripheral edema and peripheral pulses strong  MS: no gross musculoskeletal defects noted, no edema  Diabetic foot exam: normal DP and PT pulses, no trophic changes or ulcerative lesions and normal monofilament exam

## 2022-04-12 NOTE — TELEPHONE ENCOUNTER
Inez with [facility] lab calling with critical glucose value from today-    Glucose- 401    Thank you,    Daquan RODRIGUEZ RN  [facility]

## 2022-04-13 ENCOUNTER — TELEPHONE (OUTPATIENT)
Dept: FAMILY MEDICINE | Facility: CLINIC | Age: 52
End: 2022-04-13
Payer: COMMERCIAL

## 2022-04-13 NOTE — TELEPHONE ENCOUNTER
Diabetes Education Scheduling Outreach #1:    Call to patient to schedule. Left message with phone number to call to schedule.    Plan for 2nd outreach attempt within 2 business days.    Lexus Balbuena On[employer]  Diabetes and Nutrition Scheduling

## 2022-05-02 DIAGNOSIS — G47.9 SLEEP DISTURBANCE: ICD-10-CM

## 2022-05-03 RX ORDER — TRAZODONE HYDROCHLORIDE 50 MG/1
TABLET, FILM COATED ORAL
Qty: 30 TABLET | Refills: 0 | Status: SHIPPED | OUTPATIENT
Start: 2022-05-03 | End: 2022-06-01

## 2022-06-01 ENCOUNTER — OFFICE VISIT (OUTPATIENT)
Dept: FAMILY MEDICINE | Facility: CLINIC | Age: 52
End: 2022-06-01
Payer: COMMERCIAL

## 2022-06-01 VITALS
SYSTOLIC BLOOD PRESSURE: 134 MMHG | HEIGHT: 67 IN | HEART RATE: 77 BPM | TEMPERATURE: 98.3 F | DIASTOLIC BLOOD PRESSURE: 80 MMHG | WEIGHT: 169 LBS | BODY MASS INDEX: 26.53 KG/M2 | OXYGEN SATURATION: 100 %

## 2022-06-01 DIAGNOSIS — N76.0 BACTERIAL VAGINOSIS: ICD-10-CM

## 2022-06-01 DIAGNOSIS — L29.2 VULVAR ITCHING: ICD-10-CM

## 2022-06-01 DIAGNOSIS — E78.5 HYPERLIPIDEMIA LDL GOAL <100: ICD-10-CM

## 2022-06-01 DIAGNOSIS — G47.00 PERSISTENT INSOMNIA: ICD-10-CM

## 2022-06-01 DIAGNOSIS — Z12.4 CERVICAL CANCER SCREENING: ICD-10-CM

## 2022-06-01 DIAGNOSIS — E89.0 POSTSURGICAL HYPOTHYROIDISM: ICD-10-CM

## 2022-06-01 DIAGNOSIS — R80.9 TYPE 2 DIABETES MELLITUS WITH MICROALBUMINURIA, WITHOUT LONG-TERM CURRENT USE OF INSULIN (H): Primary | ICD-10-CM

## 2022-06-01 DIAGNOSIS — Z12.11 SCREEN FOR COLON CANCER: ICD-10-CM

## 2022-06-01 DIAGNOSIS — E11.29 TYPE 2 DIABETES MELLITUS WITH MICROALBUMINURIA, WITHOUT LONG-TERM CURRENT USE OF INSULIN (H): Primary | ICD-10-CM

## 2022-06-01 DIAGNOSIS — B96.89 BACTERIAL VAGINOSIS: ICD-10-CM

## 2022-06-01 DIAGNOSIS — Z23 NEED FOR VACCINATION: ICD-10-CM

## 2022-06-01 DIAGNOSIS — I10 HTN, GOAL BELOW 140/90: ICD-10-CM

## 2022-06-01 DIAGNOSIS — G47.9 SLEEP DISTURBANCE: ICD-10-CM

## 2022-06-01 PROBLEM — Z00.00 ROUTINE GENERAL MEDICAL EXAMINATION AT A HEALTH CARE FACILITY: Status: ACTIVE | Noted: 2022-06-01

## 2022-06-01 PROBLEM — Z00.00 ROUTINE GENERAL MEDICAL EXAMINATION AT A HEALTH CARE FACILITY: Status: RESOLVED | Noted: 2022-06-01 | Resolved: 2022-06-01

## 2022-06-01 LAB
CLUE CELLS: PRESENT
CREAT UR-MCNC: 101 MG/DL
MICROALBUMIN UR-MCNC: 651 MG/L
MICROALBUMIN/CREAT UR: 644.55 MG/G CR (ref 0–25)
TRICHOMONAS, WET PREP: ABNORMAL
WBC'S/HIGH POWER FIELD, WET PREP: ABNORMAL
YEAST, WET PREP: ABNORMAL

## 2022-06-01 PROCEDURE — 87624 HPV HI-RISK TYP POOLED RSLT: CPT | Performed by: PHYSICIAN ASSISTANT

## 2022-06-01 PROCEDURE — 82043 UR ALBUMIN QUANTITATIVE: CPT | Performed by: PHYSICIAN ASSISTANT

## 2022-06-01 PROCEDURE — 90472 IMMUNIZATION ADMIN EACH ADD: CPT | Performed by: PHYSICIAN ASSISTANT

## 2022-06-01 PROCEDURE — 90746 HEPB VACCINE 3 DOSE ADULT IM: CPT | Performed by: PHYSICIAN ASSISTANT

## 2022-06-01 PROCEDURE — 99214 OFFICE O/P EST MOD 30 MIN: CPT | Mod: 25 | Performed by: PHYSICIAN ASSISTANT

## 2022-06-01 PROCEDURE — G0145 SCR C/V CYTO,THINLAYER,RESCR: HCPCS | Performed by: PHYSICIAN ASSISTANT

## 2022-06-01 PROCEDURE — 87210 SMEAR WET MOUNT SALINE/INK: CPT | Performed by: PHYSICIAN ASSISTANT

## 2022-06-01 PROCEDURE — 99207 PR FOOT EXAM NO CHARGE: CPT | Performed by: PHYSICIAN ASSISTANT

## 2022-06-01 PROCEDURE — 90471 IMMUNIZATION ADMIN: CPT | Performed by: PHYSICIAN ASSISTANT

## 2022-06-01 PROCEDURE — 90732 PPSV23 VACC 2 YRS+ SUBQ/IM: CPT | Performed by: PHYSICIAN ASSISTANT

## 2022-06-01 RX ORDER — HYDROCHLOROTHIAZIDE 25 MG/1
25 TABLET ORAL DAILY
Qty: 90 TABLET | Refills: 3 | Status: SHIPPED | OUTPATIENT
Start: 2022-06-01 | End: 2023-03-14

## 2022-06-01 RX ORDER — FERROUS SULFATE 325(65) MG
TABLET ORAL
Status: CANCELLED | OUTPATIENT
Start: 2022-06-01

## 2022-06-01 RX ORDER — LISINOPRIL 40 MG/1
40 TABLET ORAL DAILY
Qty: 90 TABLET | Refills: 3 | Status: SHIPPED | OUTPATIENT
Start: 2022-06-01 | End: 2023-03-14

## 2022-06-01 RX ORDER — DIPHENHYDRAMINE HCL 50 MG
50 CAPSULE ORAL EVERY 6 HOURS PRN
Qty: 24 CAPSULE | Refills: 1 | Status: SHIPPED | OUTPATIENT
Start: 2022-06-01 | End: 2022-12-02

## 2022-06-01 RX ORDER — LANCETS
EACH MISCELLANEOUS
Qty: 102 EACH | Refills: 3 | Status: SHIPPED | OUTPATIENT
Start: 2022-06-01 | End: 2024-06-17

## 2022-06-01 RX ORDER — LEVOTHYROXINE SODIUM 175 UG/1
175 TABLET ORAL DAILY
Qty: 90 TABLET | Refills: 3 | Status: SHIPPED | OUTPATIENT
Start: 2022-06-01 | End: 2023-03-14

## 2022-06-01 RX ORDER — TRAZODONE HYDROCHLORIDE 50 MG/1
50 TABLET, FILM COATED ORAL AT BEDTIME
Qty: 60 TABLET | Refills: 1 | Status: SHIPPED | OUTPATIENT
Start: 2022-06-01 | End: 2022-10-06

## 2022-06-01 RX ORDER — METFORMIN HCL 500 MG
500 TABLET, EXTENDED RELEASE 24 HR ORAL
Qty: 90 TABLET | Refills: 3 | Status: SHIPPED | OUTPATIENT
Start: 2022-06-01 | End: 2022-11-04

## 2022-06-01 RX ORDER — METOPROLOL SUCCINATE 50 MG/1
50 TABLET, EXTENDED RELEASE ORAL DAILY
Qty: 90 TABLET | Refills: 3 | Status: SHIPPED | OUTPATIENT
Start: 2022-06-01 | End: 2023-03-14

## 2022-06-01 RX ORDER — BLOOD SUGAR DIAGNOSTIC
STRIP MISCELLANEOUS
Qty: 100 STRIP | Refills: 1 | Status: SHIPPED | OUTPATIENT
Start: 2022-06-01 | End: 2023-08-07

## 2022-06-01 RX ORDER — ATORVASTATIN CALCIUM 20 MG/1
20 TABLET, FILM COATED ORAL DAILY
Qty: 90 TABLET | Refills: 1 | Status: SHIPPED | OUTPATIENT
Start: 2022-06-01 | End: 2023-12-20

## 2022-06-01 NOTE — PROGRESS NOTES
Assessment & Plan     Type 2 diabetes mellitus with microalbuminuria, without long-term current use of insulin (H)  - metFORMIN (GLUCOPHAGE XR) 500 MG 24 hr tablet; Take 1 tablet (500 mg) by mouth daily (with dinner)  - blood glucose monitoring (ACCU-CHEK FASTCLIX) lancets; Use to test blood sugar 2 times daily or as directed.  - blood glucose (ACCU-CHEK SMARTVIEW) test strip; TEST BLOOD SUGAR ONCE DAILY OR AS DIRECTED  - Albumin Random Urine Quantitative with Creat Ratio; Future  - FOOT EXAM  - PPSV23, IM/SUBQ (2+ YRS) - [id number]  - Albumin Random Urine Quantitative with Creat Ratio    Sleep disturbance  - traZODone (DESYREL) 50 MG tablet; Take 1 tablet (50 mg) by mouth At Bedtime    HTN, goal below 140/90  - metoprolol succinate ER (TOPROL XL) 50 MG 24 hr tablet; Take 1 tablet (50 mg) by mouth daily  - lisinopril (ZESTRIL) 40 MG tablet; Take 1 tablet (40 mg) by mouth daily  - hydrochlorothiazide (HYDRODIURIL) 25 MG tablet; Take 1 tablet (25 mg) by mouth daily  - diphenhydrAMINE (BENADRYL) 50 MG capsule; Take 1 capsule (50 mg) by mouth every 6 hours as needed for sleep    Postsurgical hypothyroidism  - levothyroxine (SYNTHROID/LEVOTHROID) 175 MCG tablet; Take 1 tablet (175 mcg) by mouth daily    Persistent insomnia  - diphenhydrAMINE (BENADRYL) 50 MG capsule; Take 1 capsule (50 mg) by mouth every 6 hours as needed for sleep    Hyperlipidemia LDL goal <100  - atorvastatin (LIPITOR) 20 MG tablet; Take 1 tablet (20 mg) by mouth daily    Screen for colon cancer  - Adult Gastro Ref - Procedure Only; Future    Cervical cancer screening  - Pap Screen with HPV - recommended age 30 - 65 years    Need for vaccination  - HEPATITIS B VACCINE, ADULT, IM    Vulvar itching  - Wet prep - Clinic Collect      Return in about 6 months (around 12/1/2022) for Follow up.    Bubba Singleton PA-C  [facility] LILA Chun is a 51 year old who presents for the following health issues  accompanied  by her self.    HPI     Diabetes Follow-up    How often are you checking your blood sugar? One time daily  What time of day are you checking your blood sugars (select all that apply)?  After meals  Have you had any blood sugars above 200?  No  Have you had any blood sugars below 70?  No    What symptoms do you notice when your blood sugar is low?  Tired     What concerns do you have today about your diabetes? None     Do you have any of these symptoms? (Select all that apply)  Weight gain        Hyperlipidemia Follow-Up      Are you regularly taking any medication or supplement to lower your cholesterol?   Yes- Atorvastatin    Are you having muscle aches or other side effects that you think could be caused by your cholesterol lowering medication?  No    Hypertension Follow-up      Do you check your blood pressure regularly outside of the clinic? Yes     Are you following a low salt diet? No    Are your blood pressures ever more than 140 on the top number (systolic) OR more   than 90 on the bottom number (diastolic), for example 140/90? No    BP Readings from Last 2 Encounters:   06/01/22 134/80   04/12/22 130/80     Hemoglobin A1C POCT (%)   Date Value   01/04/2021 6.4 (H)   09/03/2019 6.2 (H)     Hemoglobin A1C (%)   Date Value   04/12/2022 10.9 (H)   09/02/2021 7.2 (H)     LDL Cholesterol Calculated (mg/dL)   Date Value   04/12/2022 116 (H)   01/04/2021 127 (H)   09/03/2019 97         How many servings of fruits and vegetables do you eat daily?  2-3    On average, how many sweetened beverages do you drink each day (Examples: soda, juice, sweet tea, etc.  Do NOT count diet or artificially sweetened beverages)?   0    How many days per week do you exercise enough to make your heart beat faster? 3 or less    How many minutes a day do you exercise enough to make your heart beat faster? 60 or more    How many days per week do you miss taking your medication? 0    Patient notes much less pruritis now that blood glucose  "levels are falling.  She is scheduled with the DM educator in 6 days.     Review of Systems   Constitutional, HEENT, cardiovascular, pulmonary, gi and gu systems are negative, except as otherwise noted.      Objective    /80   Pulse 77   Temp 98.3  F (36.8  C) (Oral)   Ht 1.69 m (5' 6.54\")   Wt 76.7 kg (169 lb)   SpO2 100%   BMI 26.84 kg/m    Body mass index is 26.84 kg/m .  Physical Exam   GENERAL: healthy, alert and no distress  RESP: lungs clear to auscultation - no rales, rhonchi or wheezes  BREAST: normal without masses, tenderness or nipple discharge and no palpable axillary masses or adenopathy  CV: regular rate and rhythm, normal S1 S2, no S3 or S4, no murmur, click or rub, no peripheral edema and peripheral pulses strong  ABDOMEN: soft, nontender, no hepatosplenomegaly, no masses and bowel sounds normal   (female): normal female external genitalia, normal urethral meatus, vaginal mucosa pink, moist, well rugated, and normal cervix/adnexa/uterus without masses or discharge  MS: no gross musculoskeletal defects noted, no edema  SKIN: no suspicious lesions or rashes  NEURO: Normal strength and tone, mentation intact and speech normal  PSYCH: mentation appears normal, affect normal/bright  Diabetic foot exam: normal DP and PT pulses, no trophic changes or ulcerative lesions and normal monofilament exam                "

## 2022-06-01 NOTE — RESULT ENCOUNTER NOTE
Some Clue Cells found on wet prep.  This would only need treatment if you find you have symptoms.  It is not sexually transmitted.  Rosalinda SANTOS

## 2022-06-02 RX ORDER — METRONIDAZOLE 7.5 MG/G
1 GEL VAGINAL AT BEDTIME
Qty: 45 G | Refills: 0 | Status: SHIPPED | OUTPATIENT
Start: 2022-06-02 | End: 2022-06-07

## 2022-06-06 LAB
BKR LAB AP GYN ADEQUACY: NORMAL
BKR LAB AP GYN INTERPRETATION: NORMAL
BKR LAB AP HPV REFLEX: NORMAL
BKR LAB AP PREVIOUS ABNORMAL: NORMAL
PATH REPORT.COMMENTS IMP SPEC: NORMAL
PATH REPORT.COMMENTS IMP SPEC: NORMAL
PATH REPORT.RELEVANT HX SPEC: NORMAL

## 2022-06-08 LAB
HUMAN PAPILLOMA VIRUS 16 DNA: NEGATIVE
HUMAN PAPILLOMA VIRUS 18 DNA: NEGATIVE
HUMAN PAPILLOMA VIRUS FINAL DIAGNOSIS: NORMAL
HUMAN PAPILLOMA VIRUS OTHER HR: NEGATIVE

## 2022-10-06 DIAGNOSIS — G47.9 SLEEP DISTURBANCE: ICD-10-CM

## 2022-10-06 RX ORDER — TRAZODONE HYDROCHLORIDE 50 MG/1
TABLET, FILM COATED ORAL
Qty: 60 TABLET | Refills: 0 | Status: SHIPPED | OUTPATIENT
Start: 2022-10-06 | End: 2022-12-02

## 2022-11-04 ENCOUNTER — ALLIED HEALTH/NURSE VISIT (OUTPATIENT)
Dept: EDUCATION SERVICES | Facility: CLINIC | Age: 52
End: 2022-11-04
Payer: COMMERCIAL

## 2022-11-04 DIAGNOSIS — E11.29 TYPE 2 DIABETES MELLITUS WITH MICROALBUMINURIA, WITHOUT LONG-TERM CURRENT USE OF INSULIN (H): ICD-10-CM

## 2022-11-04 DIAGNOSIS — R80.9 TYPE 2 DIABETES MELLITUS WITH MICROALBUMINURIA, WITHOUT LONG-TERM CURRENT USE OF INSULIN (H): ICD-10-CM

## 2022-11-04 PROCEDURE — G0108 DIAB MANAGE TRN  PER INDIV: HCPCS | Performed by: DIETITIAN, REGISTERED

## 2022-11-04 RX ORDER — METFORMIN HCL 500 MG
1000 TABLET, EXTENDED RELEASE 24 HR ORAL
Qty: 180 TABLET | Refills: 0 | Status: SHIPPED | OUTPATIENT
Start: 2022-11-04 | End: 2023-03-14

## 2022-11-04 NOTE — LETTER
11/4/2022         RE: Lay Solis  [address] [facility] [place] [address]        Dear Colleague,    Thank you for referring your patient, Lay Solis, to the [facility]. Please see a copy of my visit note below.      Diabetes Self-Management Education & Support    Presents for: Initial Assessment for new diagnosis    Type of Service: In Person Visit    Assessment Type:   ASSESSMENT:  Patient believes she had one diabetes education session many years ago.  She requests to discuss food/meal planning today.  Brings in her meter and the below blood sugars were obtained - please note the date and time were incorrect on the meter making interpretation challenging (below is as best writer is able to ascertain).  Fasting and before meal blood sugars above target with over half of post meal blood sugars in target.  Last A1C was over 6 months ago.  As patient states she needs to see her primary care provider, recommended patient schedule with primary care and have A1C drawn at that time. Recommended a personal CGM to obtain additional glucose data.  Demonstrated the Freestyle Jay to patient and ultimately she declined.  She agreed to increase fingerstick glucose monitoring.  States she is tolerating metformin XR as 500 mg/day.  Reports some nausea when she originally started taking metformin XR.  Given elevated fasting and before meal blood sugars, instructed patient to increase metformin XR to 1000 mg/day.  Introduced the Plate method and carbohydrate counting.      Patient's most recent   Lab Results   Component Value Date    A1C 10.9 04/12/2022    A1C 6.4 01/04/2021     is not meeting goal of <6.5%    Diabetes knowledge and skills assessment:   Patient is knowledgeable in diabetes management concepts related to: Monitoring and Taking Medication    Continue education with the following diabetes management concepts: Healthy Eating, Being Active, Monitoring, Taking Medication, Problem Solving,  Reducing Risks and Healthy Coping    Based on learning assessment above, most appropriate setting for further diabetes education would be: Individual setting.      PLAN  1.  Test glucose BID: fasting and before dinner.     2.  Increase metformin XR to 1000 mg/day.     3.  Schedule visit with primary care provider and have A1C drawn at that time.     4. Stop adding brown sugar to tea - choose Splenda or Truvia (stevia) instead.   Keep rice or pasta portion at a meal to 1 cup cooked - have with vegetables and protein.       Topics to cover at upcoming visits: Healthy Eating, Being Active, Monitoring, Taking Medication, Problem Solving, Reducing Risks and Healthy Coping    Follow-up: 12/2/22    See Care Plan for co-developed, patient-state behavior change goals.  AVS provided for patient today.    Education Materials Provided:  BG Log Sheet, My Plate Planner and Mauritian Carobohydrate Counting Guide      SUBJECTIVE/OBJECTIVE:  Presents for: Initial Assessment for new diagnosis  Accompanied by: Self  Diabetes education in the past 24mo: No  Focus of Visit: Healthy Eating  Diabetes type: Type 2  Date of diagnosis: 2021  Disease course: Worsening  How confident are you filling out medical forms by yourself:: Not at all  Diabetes management related comments/concerns: food/meal planning  Transportation concerns: No  Difficulty affording diabetes medication?: No  Difficulty affording diabetes testing supplies?: No  Other concerns:: Glasses  Cultural Influences/Ethnic Background:  Not  or       Diabetes Symptoms & Complications:  Fatigue: No  Neuropathy: No  Polydipsia: No  Polyphagia: Sometimes  Polyuria: No  Visual change: Yes  Slow healing wounds: No  Weight trend: Increasing  Complications assessed today?: No    Patient Problem List and Family Medical History reviewed for relevant medical history, current medical status, and diabetes risk factors.    Vitals:  There were no vitals taken for this  "visit.  Estimated body mass index is 26.84 kg/m  as calculated from the following:    Height as of 6/1/22: 1.69 m (5' 6.54\").    Weight as of 6/1/22: 76.7 kg (169 lb).   Last 3 BP:   BP Readings from Last 3 Encounters:   06/01/22 134/80   04/12/22 130/80   01/10/22 (!) 142/85       History   Smoking Status     Never   Smokeless Tobacco     Never       Labs:  Lab Results   Component Value Date    A1C 10.9 04/12/2022    A1C 6.4 01/04/2021     Lab Results   Component Value Date     04/12/2022     01/04/2021     Lab Results   Component Value Date     04/12/2022     01/04/2021     HDL Cholesterol   Date Value Ref Range Status   01/04/2021 50 >49 mg/dL Final     Direct Measure HDL   Date Value Ref Range Status   04/12/2022 46 (L) >=50 mg/dL Final   ]  GFR Estimate   Date Value Ref Range Status   04/12/2022 >90 >60 mL/min/1.73m2 Final     Comment:     Effective December 21, 2021 eGFRcr in adults is calculated using the 2021 CKD-EPI creatinine equation which includes age and gender (Akash et al., NEJM, DOI: 10.1056/WYLKjz1165156)   01/04/2021 87 >60 mL/min/[1.73_m2] Final     Comment:     Non  GFR Calc  Starting 12/18/2018, serum creatinine based estimated GFR (eGFR) will be   calculated using the Chronic Kidney Disease Epidemiology Collaboration   (CKD-EPI) equation.       GFR Estimate If Black   Date Value Ref Range Status   01/04/2021 >90 >60 mL/min/[1.73_m2] Final     Comment:      GFR Calc  Starting 12/18/2018, serum creatinine based estimated GFR (eGFR) will be   calculated using the Chronic Kidney Disease Epidemiology Collaboration   (CKD-EPI) equation.       Lab Results   Component Value Date    CR 0.76 04/12/2022    CR 0.79 01/04/2021     No results found for: MICROALBUMIN    Healthy Eating:  Healthy Eating Assessed Today: Yes  Cultural/Hoahaoism diet restrictions?: Yes (fasts for Ramadan)  Meal planning/habits: Avoiding sweets  How many times a week on " average do you eat food made away from home (restaurant/take-out)?: 1  Meals include: Breakfast, Lunch, Dinner  Breakfast: egg +  bread OR injera with tea (with brown sugar)  Lunch: rice with meat, sauce and veggies  Dinner: sometimes skips, glass of milk OR oatmeal  Snacks: tea (with brown sugar)  Beverages: Water, Juice (juice - small amt once a day)  Has patient met with a dietitian in the past?: No    Being Active:  Being Active Assessed Today: Yes  Exercise:: Yes  Days per week of moderate to strenuous exercise (like a brisk walk):  (3-4 days per week)  On average, minutes per day of exercise at this level: 60  How intense was your typical exercise? : Moderate (like brisk walking)    Monitoring:  Monitoring Assessed Today: Yes  Did patient bring glucose meter to appointment? : Yes  Blood Glucose Meter: Accu-chek  Times checking blood sugar at home (number):  (0-4 times)  Times checking blood sugar at home (per): Day  Blood glucose trend: Fluctuating    Date Breakfast  Lunch  Dinner  Bedtime    Before After Before After Before After    11/3    178 (about 30 min after)                10/29 236         10/28 156  224   157    10/27 210 359  132 180     10/26    180  219              10/24    176      10/23  298                  10/17  134         0% in target 33% in target Above target 100% in target Above target 1 of 2 in target        Taking Medications:  Diabetes Medication(s)     Biguanides       metFORMIN (GLUCOPHAGE XR) 500 MG 24 hr tablet    Take 1 tablets (500 mg) by mouth daily (with breakfast)          Taking Medication Assessed Today: Yes  Current Treatments: Oral Medication (taken by mouth)  Problems taking diabetes medications regularly?: No  Diabetes medication side effects?: No (had some nausea initially)    Problem Solving:  Problem Solving Assessed Today: Yes  Is the patient at risk for hypoglycemia?: No    Reducing Risks:  Reducing Risks Assessed Today: Yes  Diabetes Risks: Age over 45 years,  Hyperlipidemia, Ethnicity  CAD Risks: Diabetes Mellitus, Dyslipidemia, Hypertension  Has dilated eye exam at least once a year?: Yes  Sees dentist every 6 months?: No  Feet checked by healthcare provider in the last year?: Yes    Healthy Coping:  Healthy Coping Assessed Today: Yes  Emotional response to diabetes: Ready to learn  Informal Support system:: None  Stage of change: ACTION (Actively working towards change)  Support resources: None  Patient Activation Measure Survey Score:  No flowsheet data found.      Care Plan and Education Provided:  Care Plan: Diabetes   Updates made by Pili Calvert RD since 11/4/2022 12:00 AM      Problem: HbA1C Not In Goal       Goal: Establish Regular Follow-Ups with PCP       Task: Discuss with PCP the recommended timing for patient's next follow up visit(s)    Responsible User: Pili Calvert RD      Task: Discuss schedule for PCP visits with patient    Responsible User: Pili Calvert RD      Goal: Get HbA1C Level in Goal       Task: Educate patient on diabetes education self-management topics    Responsible User: Pili Calvert RD      Task: Educate patient on benefits of regular glucose monitoring    Responsible User: Pili Calvert RD      Task: Refer patient to appropriate extended care team member, as needed (Medication Therapy Management, Behavioral Health, Physical Therapy, etc.)    Responsible User: Pili Calvert RD      Task: Discuss diabetes treatment plan with patient    Responsible User: Pili Calvert RD      Problem: Diabetes Self-Management Education Needed to Optimize Self-Care Behaviors       Goal: Understand diabetes pathophysiology and disease progression       Task: Provide education on diabetes pathophysiology and disease progression specfic to patient's diabetes type    Responsible User: Pili Calvert RD      Goal: Healthy Eating - follow a healthy eating pattern for diabetes       Task: Provide education  on portion control and consistency in amount, composition and timing of food intake    Responsible User: Pili Calvert RD      Task: Provide education on managing carbohydrate intake (carbohydrate counting, plate planning method, etc.)    Responsible User: Pili Calvert RD      Task: Provide education on weight management    Responsible User: Pili Calvert RD      Task: Provide education on heart healthy eating    Responsible User: Pili Calvert RD      Task: Provide education on eating out    Responsible User: Pili Calvert RD      Task: Develop individualized healthy eating plan with patient    Responsible User: Pili Calvert RD      Goal: Being Active - get regular physical activity, working up to at least 150 minutes per week       Task: Provide education on relationship of activity to glucose and precautions to take if at risk for low glucose    Responsible User: Pili Calvert RD      Task: Discuss barriers to physical activity with patient    Responsible User: Pili Calvert RD      Task: Develop physical activity plan with patient    Responsible User: Pili Calvert RD      Task: Explore community resources including walking groups, assistance programs, and home videos    Responsible User: Pili Calvert RD      Goal: Monitoring - monitor glucose and ketones as directed    Note:    Test blood sugar twice a day.      Task: Provide education on blood glucose monitoring (purpose, proper technique, frequency, glucose targets, interpreting results, when to use glucose control solution, sharps disposal) Completed 11/4/2022   Responsible User: Pili Calvert RD      Task: Provide education on continuous glucose monitoring (sensor placement, use of osmel or /reader, understanding glucose trends, alerts and alarms, differences between sensor glucose and blood glucose)    Responsible User: Pili Calvert RD      Task: Provide education  on ketone monitoring (when to monitor, frequency, etc.)    Responsible User: Pili Calvert RD      Goal: Taking Medication - patient is consistently taking medications as directed       Task: Provide education on action of prescribed medication, including when to take and possible side effects    Responsible User: Pili Calvert RD      Task: Provide education on insulin and injectable diabetes medications, including administration, storage, site selection and rotation for injection sites    Responsible User: Pili Calvert RD      Task: Discuss barriers to medication adherence with patient and provide management technique ideas as appropriate    Responsible User: Pili Calvert RD      Task: Provide education on frequency and refill details of medications    Responsible User: Pili Calvert RD      Goal: Problem Solving - know how to prevent and manage short-term diabetes complications       Task: Provide education on high blood glucose - causes, signs/symptoms, prevention and treatment    Responsible User: Pili Calvert RD      Task: Provide education on low blood glucose - causes, signs/symptoms, prevention, treatment, carrying a carbohydrate source at all times, and medical identification    Responsible User: Pili Calvert RD      Task: Provide education on safe travel with diabetes    Responsible User: Pili Calvert RD      Task: Provide education on how to care for diabetes on sick days    Responsible User: Pili Calvert RD      Task: Provide education on when to call a health care provider    Responsible User: Pili Calvert RD      Goal: Reducing Risks - know how to prevent and treat long-term diabetes complications       Task: Provide education on major complications of diabetes, prevention, early diagnostic measures and treatment of complications    Responsible User: Pili Calvert RD      Task: Provide education on recommended care for  dental, eye and foot health Completed 11/4/2022   Responsible User: Pili Calvert RD      Task: Provide education on Hemoglobin A1c - goals and relationship to blood glucose levels    Responsible User: Pili Calvert RD      Task: Provide education on recommendations for heart health - lipid levels and goals, blood pressure and goals, and aspirin therapy, if indicated    Responsible User: Pili Calvert RD      Task: Provide education on tobacco cessation    Responsible User: Pili Calvert RD      Goal: Healthy Coping - use available resources to cope with the challenges of managing diabetes       Task: Discuss recognizing feelings about having diabetes    Responsible User: Pili Calvert RD      Task: Provide education on the benefits of making appropriate lifestyle changes    Responsible User: Pili Calvert RD      Task: Provide education on benefits of utilizing support systems    Responsible User: Pili Calvert RD      Task: Discuss methods for coping with stress    Responsible User: Pili Calvert RD      Task: Provide education on when to seek professional counseling    Responsible User: Pili Calvert RD Beth Reisdorf, MPH, RD, CDCES, LD 11/4/2022      Time Spent: 60 minutes  Encounter Type: Individual    Any diabetes medication dose changes were made via the CDE Protocol per the patient's referring provider. A copy of this encounter was shared with the provider.

## 2022-11-04 NOTE — PATIENT INSTRUCTIONS
Increase metformin extended release to 2 tablets (1000 mg) once a day.     2.  Test blood sugar two times per day: before breakfast (fasting) and before evening meal    Goal:  mg/dL    3. Schedule an appointment with your primary care provider and have your A1C drawn.     4.  Meal Planning    Stop adding brown sugar to your tea.   Use Splenda or Truvia (stevia) instead to jl your tea.     When you have rice or pasta, stick to 1 cup cooked at a meal - have with lots of vegetables and protein (salmon, goat, lamb)    Please call or send a Room n House message with any questions or concerns.    Lisa Calvert, MPH, RD, [place], LD  Diabetes Education Triage Line: [phone]  Diabetes Education Appointment [phone]

## 2022-11-04 NOTE — PROGRESS NOTES
Diabetes Self-Management Education & Support    Presents for: Initial Assessment for new diagnosis    Type of Service: In Person Visit    Assessment Type:   ASSESSMENT:  Patient believes she had one diabetes education session many years ago.  She requests to discuss food/meal planning today.  Brings in her meter and the below blood sugars were obtained - please note the date and time were incorrect on the meter making interpretation challenging (below is as best writer is able to ascertain).  Fasting and before meal blood sugars above target with over half of post meal blood sugars in target.  Last A1C was over 6 months ago.  As patient states she needs to see her primary care provider, recommended patient schedule with primary care and have A1C drawn at that time. Recommended a personal CGM to obtain additional glucose data.  Demonstrated the Freestyle Jay to patient and ultimately she declined.  She agreed to increase fingerstick glucose monitoring.  States she is tolerating metformin XR as 500 mg/day.  Reports some nausea when she originally started taking metformin XR.  Given elevated fasting and before meal blood sugars, instructed patient to increase metformin XR to 1000 mg/day.  Introduced the Plate method and carbohydrate counting.      Patient's most recent   Lab Results   Component Value Date    A1C 10.9 04/12/2022    A1C 6.4 01/04/2021     is not meeting goal of <6.5%    Diabetes knowledge and skills assessment:   Patient is knowledgeable in diabetes management concepts related to: Monitoring and Taking Medication    Continue education with the following diabetes management concepts: Healthy Eating, Being Active, Monitoring, Taking Medication, Problem Solving, Reducing Risks and Healthy Coping    Based on learning assessment above, most appropriate setting for further diabetes education would be: Individual setting.      PLAN  1.  Test glucose BID: fasting and before dinner.     2.  Increase metformin  "XR to 1000 mg/day.     3.  Schedule visit with primary care provider and have A1C drawn at that time.     4. Stop adding brown sugar to tea - choose Splenda or Truvia (stevia) instead.   Keep rice or pasta portion at a meal to 1 cup cooked - have with vegetables and protein.       Topics to cover at upcoming visits: Healthy Eating, Being Active, Monitoring, Taking Medication, Problem Solving, Reducing Risks and Healthy Coping    Follow-up: 12/2/22    See Care Plan for co-developed, patient-state behavior change goals.  AVS provided for patient today.    Education Materials Provided:  BG Log Sheet, My Plate Planner and Filipino Carobohydrate Counting Guide      SUBJECTIVE/OBJECTIVE:  Presents for: Initial Assessment for new diagnosis  Accompanied by: Self  Diabetes education in the past 24mo: No  Focus of Visit: Healthy Eating  Diabetes type: Type 2  Date of diagnosis: 2021  Disease course: Worsening  How confident are you filling out medical forms by yourself:: Not at all  Diabetes management related comments/concerns: food/meal planning  Transportation concerns: No  Difficulty affording diabetes medication?: No  Difficulty affording diabetes testing supplies?: No  Other concerns:: Glasses  Cultural Influences/Ethnic Background:  Not  or       Diabetes Symptoms & Complications:  Fatigue: No  Neuropathy: No  Polydipsia: No  Polyphagia: Sometimes  Polyuria: No  Visual change: Yes  Slow healing wounds: No  Weight trend: Increasing  Complications assessed today?: No    Patient Problem List and Family Medical History reviewed for relevant medical history, current medical status, and diabetes risk factors.    Vitals:  There were no vitals taken for this visit.  Estimated body mass index is 26.84 kg/m  as calculated from the following:    Height as of 6/1/22: 1.69 m (5' 6.54\").    Weight as of 6/1/22: 76.7 kg (169 lb).   Last 3 BP:   BP Readings from Last 3 Encounters:   06/01/22 134/80   04/12/22 130/80 "   01/10/22 (!) 142/85       History   Smoking Status     Never   Smokeless Tobacco     Never       Labs:  Lab Results   Component Value Date    A1C 10.9 04/12/2022    A1C 6.4 01/04/2021     Lab Results   Component Value Date     04/12/2022     01/04/2021     Lab Results   Component Value Date     04/12/2022     01/04/2021     HDL Cholesterol   Date Value Ref Range Status   01/04/2021 50 >49 mg/dL Final     Direct Measure HDL   Date Value Ref Range Status   04/12/2022 46 (L) >=50 mg/dL Final   ]  GFR Estimate   Date Value Ref Range Status   04/12/2022 >90 >60 mL/min/1.73m2 Final     Comment:     Effective December 21, 2021 eGFRcr in adults is calculated using the 2021 CKD-EPI creatinine equation which includes age and gender (Akash cerda al., NEJ, DOI: 10.1056/QKEXrh1731070)   01/04/2021 87 >60 mL/min/[1.73_m2] Final     Comment:     Non  GFR Calc  Starting 12/18/2018, serum creatinine based estimated GFR (eGFR) will be   calculated using the Chronic Kidney Disease Epidemiology Collaboration   (CKD-EPI) equation.       GFR Estimate If Black   Date Value Ref Range Status   01/04/2021 >90 >60 mL/min/[1.73_m2] Final     Comment:      GFR Calc  Starting 12/18/2018, serum creatinine based estimated GFR (eGFR) will be   calculated using the Chronic Kidney Disease Epidemiology Collaboration   (CKD-EPI) equation.       Lab Results   Component Value Date    CR 0.76 04/12/2022    CR 0.79 01/04/2021     No results found for: MICROALBUMIN    Healthy Eating:  Healthy Eating Assessed Today: Yes  Cultural/Restoration diet restrictions?: Yes (fasts for Ramadan)  Meal planning/habits: Avoiding sweets  How many times a week on average do you eat food made away from home (restaurant/take-out)?: 1  Meals include: Breakfast, Lunch, Dinner  Breakfast: egg +  bread OR injera with tea (with brown sugar)  Lunch: rice with meat, sauce and veggies  Dinner: sometimes skips, glass of milk  OR oatmeal  Snacks: tea (with brown sugar)  Beverages: Water, Juice (juice - small amt once a day)  Has patient met with a dietitian in the past?: No    Being Active:  Being Active Assessed Today: Yes  Exercise:: Yes  Days per week of moderate to strenuous exercise (like a brisk walk):  (3-4 days per week)  On average, minutes per day of exercise at this level: 60  How intense was your typical exercise? : Moderate (like brisk walking)    Monitoring:  Monitoring Assessed Today: Yes  Did patient bring glucose meter to appointment? : Yes  Blood Glucose Meter: Accu-chek  Times checking blood sugar at home (number):  (0-4 times)  Times checking blood sugar at home (per): Day  Blood glucose trend: Fluctuating    Date Breakfast  Lunch  Dinner  Bedtime    Before After Before After Before After    11/3    178 (about 30 min after)                10/29 236         10/28 156  224   157    10/27 210 359  132 180     10/26    180  219              10/24    176      10/23  298                  10/17  134         0% in target 33% in target Above target 100% in target Above target 1 of 2 in target        Taking Medications:  Diabetes Medication(s)     Biguanides       metFORMIN (GLUCOPHAGE XR) 500 MG 24 hr tablet    Take 1 tablets (500 mg) by mouth daily (with breakfast)          Taking Medication Assessed Today: Yes  Current Treatments: Oral Medication (taken by mouth)  Problems taking diabetes medications regularly?: No  Diabetes medication side effects?: No (had some nausea initially)    Problem Solving:  Problem Solving Assessed Today: Yes  Is the patient at risk for hypoglycemia?: No    Reducing Risks:  Reducing Risks Assessed Today: Yes  Diabetes Risks: Age over 45 years, Hyperlipidemia, Ethnicity  CAD Risks: Diabetes Mellitus, Dyslipidemia, Hypertension  Has dilated eye exam at least once a year?: Yes  Sees dentist every 6 months?: No  Feet checked by healthcare provider in the last year?: Yes    Healthy Coping:  Healthy  Coping Assessed Today: Yes  Emotional response to diabetes: Ready to learn  Informal Support system:: None  Stage of change: ACTION (Actively working towards change)  Support resources: None  Patient Activation Measure Survey Score:  No flowsheet data found.      Care Plan and Education Provided:  Care Plan: Diabetes   Updates made by Pili Calvert RD since 11/4/2022 12:00 AM      Problem: HbA1C Not In Goal       Goal: Establish Regular Follow-Ups with PCP       Task: Discuss with PCP the recommended timing for patient's next follow up visit(s)    Responsible User: Plii Calvert RD      Task: Discuss schedule for PCP visits with patient    Responsible User: Pili Calvert RD      Goal: Get HbA1C Level in Goal       Task: Educate patient on diabetes education self-management topics    Responsible User: Pili Calvert RD      Task: Educate patient on benefits of regular glucose monitoring    Responsible User: Pili Calvert RD      Task: Refer patient to appropriate extended care team member, as needed (Medication Therapy Management, Behavioral Health, Physical Therapy, etc.)    Responsible User: Pili Calvert RD      Task: Discuss diabetes treatment plan with patient    Responsible User: Pili Calvert RD      Problem: Diabetes Self-Management Education Needed to Optimize Self-Care Behaviors       Goal: Understand diabetes pathophysiology and disease progression       Task: Provide education on diabetes pathophysiology and disease progression specfic to patient's diabetes type    Responsible User: Pili Calvert RD      Goal: Healthy Eating - follow a healthy eating pattern for diabetes       Task: Provide education on portion control and consistency in amount, composition and timing of food intake    Responsible User: Pili Calvert RD      Task: Provide education on managing carbohydrate intake (carbohydrate counting, plate planning method, etc.)     Responsible User: Pili Calvert RD      Task: Provide education on weight management    Responsible User: Pili Calvert RD      Task: Provide education on heart healthy eating    Responsible User: Pili Calvert RD      Task: Provide education on eating out    Responsible User: Pili Calvert RD      Task: Develop individualized healthy eating plan with patient    Responsible User: Pili Calvert RD      Goal: Being Active - get regular physical activity, working up to at least 150 minutes per week       Task: Provide education on relationship of activity to glucose and precautions to take if at risk for low glucose    Responsible User: Pili Calvert RD      Task: Discuss barriers to physical activity with patient    Responsible User: Pili Calvert RD      Task: Develop physical activity plan with patient    Responsible User: Pili Calvert RD      Task: Explore community resources including walking groups, assistance programs, and home videos    Responsible User: Pili Calvert RD      Goal: Monitoring - monitor glucose and ketones as directed    Note:    Test blood sugar twice a day.      Task: Provide education on blood glucose monitoring (purpose, proper technique, frequency, glucose targets, interpreting results, when to use glucose control solution, sharps disposal) Completed 11/4/2022   Responsible User: Pili Calvert RD      Task: Provide education on continuous glucose monitoring (sensor placement, use of osmel or /reader, understanding glucose trends, alerts and alarms, differences between sensor glucose and blood glucose)    Responsible User: Pili Calvert RD      Task: Provide education on ketone monitoring (when to monitor, frequency, etc.)    Responsible User: Pili Calvert RD      Goal: Taking Medication - patient is consistently taking medications as directed       Task: Provide education on action of prescribed  medication, including when to take and possible side effects    Responsible User: Pili Calvert RD      Task: Provide education on insulin and injectable diabetes medications, including administration, storage, site selection and rotation for injection sites    Responsible User: Pili Calvert RD      Task: Discuss barriers to medication adherence with patient and provide management technique ideas as appropriate    Responsible User: Pili Calvert RD      Task: Provide education on frequency and refill details of medications    Responsible User: Pili Calvert RD      Goal: Problem Solving - know how to prevent and manage short-term diabetes complications       Task: Provide education on high blood glucose - causes, signs/symptoms, prevention and treatment    Responsible User: Pili Calvert RD      Task: Provide education on low blood glucose - causes, signs/symptoms, prevention, treatment, carrying a carbohydrate source at all times, and medical identification    Responsible User: Pili Calvert RD      Task: Provide education on safe travel with diabetes    Responsible User: Pili Calvert RD      Task: Provide education on how to care for diabetes on sick days    Responsible User: Pili Calvert RD      Task: Provide education on when to call a health care provider    Responsible User: Pili Calvert RD      Goal: Reducing Risks - know how to prevent and treat long-term diabetes complications       Task: Provide education on major complications of diabetes, prevention, early diagnostic measures and treatment of complications    Responsible User: Pili Calvert RD      Task: Provide education on recommended care for dental, eye and foot health Completed 11/4/2022   Responsible User: Pili Calvert RD      Task: Provide education on Hemoglobin A1c - goals and relationship to blood glucose levels    Responsible User: Pili Calvert RD       Task: Provide education on recommendations for heart health - lipid levels and goals, blood pressure and goals, and aspirin therapy, if indicated    Responsible User: Pili Calvert RD      Task: Provide education on tobacco cessation    Responsible User: Pili Calvert RD      Goal: Healthy Coping - use available resources to cope with the challenges of managing diabetes       Task: Discuss recognizing feelings about having diabetes    Responsible User: Plii Calvert RD      Task: Provide education on the benefits of making appropriate lifestyle changes    Responsible User: Pili Calvert RD      Task: Provide education on benefits of utilizing support systems    Responsible User: Pili Calvert RD      Task: Discuss methods for coping with stress    Responsible User: Pili Calvert RD      Task: Provide education on when to seek professional counseling    Responsible User: Pili Calvert RD Beth Reisdorf, MPH, RD, CDCES, LD 11/4/2022      Time Spent: 60 minutes  Encounter Type: Individual    Any diabetes medication dose changes were made via the CDE Protocol per the patient's referring provider. A copy of this encounter was shared with the provider.

## 2022-12-23 ENCOUNTER — TELEPHONE (OUTPATIENT)
Dept: EDUCATION SERVICES | Facility: CLINIC | Age: 52
End: 2022-12-23

## 2022-12-23 NOTE — TELEPHONE ENCOUNTER
No show for scheduled appointment today. Schedulers to reach out to assist with rescheduling.   Lisa Calvert, MPH, RD, CDCES, LD 12/23/2022

## 2023-01-04 ENCOUNTER — TELEPHONE (OUTPATIENT)
Dept: FAMILY MEDICINE | Facility: CLINIC | Age: 53
End: 2023-01-04

## 2023-01-04 NOTE — LETTER
January 4, 2023    To  Lay Solis  [address] [place] [id number]    Your team at [facility] cares about your health. We have reviewed your chart and based on our findings; we are making the following recommendations to better manage your health.     You are in particular need of attention regarding the following:     Schedule Annual MAMMOGRAPHY. [facility] scheduling number is [phone] or schedule in [facility] (self referral).  Call or MyChart message your clinic to schedule a colonoscopy, schedule/ a FIT Test, or order a Cologuard test. If you are unsure what type of test you need, please call your clinic and speak to clinic staff.   Colon cancer is now the second leading cause of cancer-related deaths in the [place] for both men and women and there are over 130,000 new cases and 50,000 deaths per year from colon cancer. Colonoscopies can prevent 90-95% of these deaths. Problem lesions can be removed before they ever become cancer. This test is not only looking for cancer, but also getting rid of precancerous lesions.   If you are under/uninsured, we recommend you contact the [employer] Program.[employer] is a free colorectal cancer screening program that provides colonoscopies for eligible under/uninsured [place] men and women. If you are interested in receiving a free colonoscopy, please call [employer] at t [phone] (mention code [employer]) to see if you're eligible. Please have them send us the results.   PREVENTATIVE VISIT: Physical    If you have already completed these items, please contact the clinic via phone or   [facility] so your care team can review and update your records. Thank you for   choosing [facility] for your healthcare needs. For any questions,   concerns, or to schedule an appointment please contact our clinic.    Healthy Regards,      Your [facility] Care Team

## 2023-01-04 NOTE — TELEPHONE ENCOUNTER
Patient Quality Outreach    Patient is due for the following:   Colon Cancer Screening  Breast Cancer Screening - Mammogram  Physical Annual Wellness Visit    Next Steps:   Schedule a Adult Preventative    Type of outreach:    Sent letter.      Questions for provider review:    None     BINH MAYNARD, CMA

## 2023-01-24 DIAGNOSIS — G47.00 PERSISTENT INSOMNIA: ICD-10-CM

## 2023-01-24 DIAGNOSIS — I10 HTN, GOAL BELOW 140/90: ICD-10-CM

## 2023-01-24 DIAGNOSIS — G47.9 SLEEP DISTURBANCE: ICD-10-CM

## 2023-01-25 RX ORDER — DIPHENHYDRAMINE HCL 50 MG/1
CAPSULE ORAL
Qty: 24 CAPSULE | Refills: 0 | Status: SHIPPED | OUTPATIENT
Start: 2023-01-25 | End: 2023-03-14

## 2023-01-25 RX ORDER — TRAZODONE HYDROCHLORIDE 50 MG/1
TABLET, FILM COATED ORAL
Qty: 60 TABLET | Refills: 0 | Status: SHIPPED | OUTPATIENT
Start: 2023-01-25 | End: 2023-03-27

## 2023-03-14 ENCOUNTER — OFFICE VISIT (OUTPATIENT)
Dept: FAMILY MEDICINE | Facility: CLINIC | Age: 53
End: 2023-03-14
Payer: COMMERCIAL

## 2023-03-14 VITALS
HEART RATE: 88 BPM | RESPIRATION RATE: 16 BRPM | OXYGEN SATURATION: 97 % | DIASTOLIC BLOOD PRESSURE: 82 MMHG | BODY MASS INDEX: 26.97 KG/M2 | WEIGHT: 169.8 LBS | SYSTOLIC BLOOD PRESSURE: 142 MMHG

## 2023-03-14 DIAGNOSIS — I10 HTN, GOAL BELOW 140/90: ICD-10-CM

## 2023-03-14 DIAGNOSIS — N18.1 CHRONIC KIDNEY DISEASE, STAGE 1: ICD-10-CM

## 2023-03-14 DIAGNOSIS — E89.0 POSTSURGICAL HYPOTHYROIDISM: ICD-10-CM

## 2023-03-14 DIAGNOSIS — E11.21 TYPE 2 DIABETES MELLITUS WITH MACROALBUMINURIC DIABETIC NEPHROPATHY (H): Primary | ICD-10-CM

## 2023-03-14 DIAGNOSIS — C73 THYROID CANCER (H): ICD-10-CM

## 2023-03-14 DIAGNOSIS — Z13.220 SCREENING FOR HYPERLIPIDEMIA: ICD-10-CM

## 2023-03-14 LAB — HBA1C MFR BLD: 7.9 % (ref 0–5.6)

## 2023-03-14 PROCEDURE — 36415 COLL VENOUS BLD VENIPUNCTURE: CPT | Performed by: INTERNAL MEDICINE

## 2023-03-14 PROCEDURE — 84443 ASSAY THYROID STIM HORMONE: CPT | Performed by: INTERNAL MEDICINE

## 2023-03-14 PROCEDURE — 82570 ASSAY OF URINE CREATININE: CPT | Performed by: INTERNAL MEDICINE

## 2023-03-14 PROCEDURE — 99214 OFFICE O/P EST MOD 30 MIN: CPT | Performed by: INTERNAL MEDICINE

## 2023-03-14 PROCEDURE — 84439 ASSAY OF FREE THYROXINE: CPT | Performed by: INTERNAL MEDICINE

## 2023-03-14 PROCEDURE — 84460 ALANINE AMINO (ALT) (SGPT): CPT | Performed by: INTERNAL MEDICINE

## 2023-03-14 PROCEDURE — 80048 BASIC METABOLIC PNL TOTAL CA: CPT | Performed by: INTERNAL MEDICINE

## 2023-03-14 PROCEDURE — 83036 HEMOGLOBIN GLYCOSYLATED A1C: CPT | Performed by: INTERNAL MEDICINE

## 2023-03-14 PROCEDURE — 80061 LIPID PANEL: CPT | Performed by: INTERNAL MEDICINE

## 2023-03-14 PROCEDURE — 82043 UR ALBUMIN QUANTITATIVE: CPT | Performed by: INTERNAL MEDICINE

## 2023-03-14 RX ORDER — LISINOPRIL 40 MG/1
40 TABLET ORAL DAILY
Qty: 90 TABLET | Refills: 3 | Status: SHIPPED | OUTPATIENT
Start: 2023-03-14 | End: 2024-02-21

## 2023-03-14 RX ORDER — HYDROCHLOROTHIAZIDE 25 MG/1
25 TABLET ORAL DAILY
Qty: 90 TABLET | Refills: 3 | Status: SHIPPED | OUTPATIENT
Start: 2023-03-14 | End: 2024-02-21

## 2023-03-14 RX ORDER — LEVOTHYROXINE SODIUM 175 UG/1
175 TABLET ORAL DAILY
Qty: 90 TABLET | Refills: 1 | Status: SHIPPED | OUTPATIENT
Start: 2023-03-14 | End: 2024-02-14

## 2023-03-14 RX ORDER — METRONIDAZOLE 7.5 MG/G
1 GEL VAGINAL AT BEDTIME
COMMUNITY
Start: 2022-06-07 | End: 2023-03-14

## 2023-03-14 RX ORDER — METFORMIN HCL 500 MG
1000 TABLET, EXTENDED RELEASE 24 HR ORAL
Qty: 180 TABLET | Refills: 1 | Status: SHIPPED | OUTPATIENT
Start: 2023-03-14

## 2023-03-14 RX ORDER — METOPROLOL SUCCINATE 50 MG/1
50 TABLET, EXTENDED RELEASE ORAL DAILY
Qty: 90 TABLET | Refills: 3 | Status: SHIPPED | OUTPATIENT
Start: 2023-03-14 | End: 2024-02-21

## 2023-03-14 ASSESSMENT — PAIN SCALES - GENERAL: PAINLEVEL: NO PAIN (0)

## 2023-03-14 NOTE — PROGRESS NOTES
DEO Chun is a 52 year old, presenting for the following health issues:    Diabetes Follow-up    How often are you checking your blood sugar? One time daily  What time of day are you checking your blood sugars (select all that apply)?  After meals  Have you had any blood sugars above 200?  Yes sometimes   Have you had any blood sugars below 70?  No    What symptoms do you notice when your blood sugar is low?  None    What concerns do you have today about your diabetes? None     Do you have any of these symptoms? (Select all that apply)  No numbness or tingling in feet.  No redness, sores or blisters on feet.  No complaints of excessive thirst.  No reports of blurry vision.  No significant changes to weight.    Have you had a diabetic eye exam in the last 12 months? Yes.    Hyperlipidemia Follow-Up      Are you regularly taking any medication or supplement to lower your cholesterol?   Yes- Atorvastatin    Are you having muscle aches or other side effects that you think could be caused by your cholesterol lowering medication?  No    Hypertension Follow-up      Do you check your blood pressure regularly outside of the clinic? Yes     Are you following a low salt diet? No    Are your blood pressures ever more than 140 on the top number (systolic) OR more   than 90 on the bottom number (diastolic), for example 140/90? Yes    BP Readings from Last 2 Encounters:   06/01/22 134/80   04/12/22 130/80     Hemoglobin A1C (%)   Date Value   04/12/2022 10.9 (H)   09/02/2021 7.2 (H)   01/04/2021 6.4 (H)   09/03/2019 6.2 (H)     LDL Cholesterol Calculated (mg/dL)   Date Value   04/12/2022 116 (H)   01/04/2021 127 (H)   09/03/2019 97   }    REVIEW OF SYSTEMS   CONSTITUTIONAL: NEGATIVE for fever, chills, change in weight  INTEGUMENTARY/SKIN: NEGATIVE for worrisome rashes, moles or lesions  EYES: NEGATIVE for vision changes or irritation  ENT/MOUTH: NEGATIVE for ear, mouth and throat problems  RESP: NEGATIVE for significant  cough or SOB  BREAST: NEGATIVE for masses, tenderness or discharge  CV: NEGATIVE for chest pain, palpitations or peripheral edema  GI: NEGATIVE for nausea, abdominal pain, heartburn, or change in bowel habits  : NEGATIVE for frequency, dysuria, or hematuria  MUSCULOSKELETAL: NEGATIVE for significant arthralgias or myalgia  NEURO: NEGATIVE for weakness, dizziness or paresthesias  ENDOCRINE: POSITIVE  for thyroid cancer, S/P thyroidectomy.  HEME: NEGATIVE for bleeding problems  PSYCHIATRIC: NEGATIVE for changes in mood or affect      OBJECTIVE   Physical Exam   BP (!) 142/82   Pulse 88   Resp 16   Wt 77 kg (169 lb 12.8 oz)   SpO2 97%   BMI 26.97 kg/m    GENERAL: healthy, alert and no distress  EYES: Eyes grossly normal to inspection and conjunctivae and sclerae normal  HENT: normal cephalic/atraumatic  RESP: lungs clear to auscultation - no rales, rhonchi or wheezes  CV: regular rate and rhythm, normal S1 S2, no S3 or S4, no murmur, click or rub, no peripheral edema and peripheral pulses strong  ABDOMEN: soft, nontender, no hepatosplenomegaly, no masses and bowel sounds normal  MS: no gross musculoskeletal defects noted, no edema  NEURO: Normal strength and tone, mentation intact and speech normal  PSYCH: mentation appears normal, affect normal/bright    ASSESSMENT/PLAN  Type 2 diabetes mellitus with macroalbuminuric diabetic nephropathy (H)  - REVIEW OF HEALTH MAINTENANCE PROTOCOL ORDERS  - HEMOGLOBIN A1C  - BASIC METABOLIC PANEL  - Albumin Random Urine Quantitative with Creat Ratio  - metFORMIN (GLUCOPHAGE XR) 500 MG 24 hr tablet; Take 2 tablets (1,000 mg) by mouth daily (with breakfast)    HTN, goal below 140/90  - REVIEW OF HEALTH MAINTENANCE PROTOCOL ORDERS  - BASIC METABOLIC PANEL  - Albumin Random Urine Quantitative with Creat Ratio  - hydrochlorothiazide (HYDRODIURIL) 25 MG tablet; Take 1 tablet (25 mg) by mouth daily  - lisinopril (ZESTRIL) 40 MG tablet; Take 1 tablet (40 mg) by mouth daily  -  metoprolol succinate ER (TOPROL XL) 50 MG 24 hr tablet; Take 1 tablet (50 mg) by mouth daily    Postsurgical hypothyroidism  - TSH WITH FREE T4 REFLEX  - TSH  - T4, free  - levothyroxine (SYNTHROID/LEVOTHROID) 175 MCG tablet; Take 1 tablet (175 mcg) by mouth daily    Chronic kidney disease, stage 1  - REVIEW OF HEALTH MAINTENANCE PROTOCOL ORDERS  - BASIC METABOLIC PANEL    Thyroid cancer (H)  - TSH WITH FREE T4 REFLEX    Screening for hyperlipidemia  - REVIEW OF HEALTH MAINTENANCE PROTOCOL ORDERS  - Lipid panel reflex to direct LDL Non-fasting  - ALT    Disposition:  Follow-up in 12 weeks.    Gareth Vazquez MD  Internal Medicine

## 2023-03-14 NOTE — PATIENT INSTRUCTIONS
At [facility], we strive to deliver an exceptional experience to you, every time we see you. If you receive a survey, please complete it as we do value your feedback.  If you have MyChart, you can expect to receive results automatically within 24 hours of their completion.  Your provider will send a note interpreting your results as well.   If you do not have MyChart, you should receive your results in about a week by mail.    Your care team:                            Family Medicine Internal Medicine   MD Gareth Jung MD Shantel Branch-Fleming, MD Srinivasa Vaka, MD Katya Belousova, PASUKHI Parrish CNP, MD ([facility]   Gio Celis, MD Charisma Ross MD Amelia Massimini APRN GUDELIA Walsh APRN MD Tiago Berman MD          Clinic hours: Monday - Thursday 7 am-6 pm; Fridays 7 am-5 pm.   Urgent care: Monday - Friday 10 am- 8 pm; Saturday and Sunday 9 am-5 pm.    Clinic: [phone]       [facility]: Monday - Thursday 8 am - 7 pm; Friday 8 am - 6 pm  [facility]: [phone]

## 2023-03-14 NOTE — LETTER
March 21, 2023      Lay Solis  [address] [place] [address]  [place] [address]      Dear Lay,     Lab tests show that your diabetes, cholesterol panel and thyroid function tests are better.  Kidney function and liver function are normal. But an abnormal urine albumin means that your diabetes and hypertension has still affected your kidneys. Continue current medications. For any questions, you may call my office at [phone].     Sincerely,     Gareth Vazquez MD   Internal Medicine       Resulted Orders   HEMOGLOBIN A1C   Result Value Ref Range    Hemoglobin A1C 7.9 (H) 0.0 - 5.6 %   BASIC METABOLIC PANEL   Result Value Ref Range    Sodium 138 133 - 144 mmol/L    Potassium 3.5 3.4 - 5.3 mmol/L    Chloride 104 94 - 109 mmol/L    Carbon Dioxide (CO2) 28 20 - 32 mmol/L    Anion Gap 6 3 - 14 mmol/L    Urea Nitrogen 15 7 - 30 mg/dL    Creatinine 0.72 0.52 - 1.04 mg/dL    Calcium 9.3 8.5 - 10.1 mg/dL    Glucose 172 (H) 70 - 99 mg/dL    GFR Estimate >90 >60 mL/min/1.73m2      Comment:      eGFR calculated using 2021 CKD-EPI equation.   Lipid panel reflex to direct LDL Non-fasting   Result Value Ref Range    Cholesterol 194 <200 mg/dL    Triglycerides 238 (H) <150 mg/dL    Direct Measure HDL 45 (L) >=50 mg/dL    LDL Cholesterol Calculated 101 (H) <=100 mg/dL    Non HDL Cholesterol 149 (H) <130 mg/dL    Patient Fasting > 8hrs? No     Narrative    Cholesterol  Desirable:  <200 mg/dL    Triglycerides  Normal:  Less than 150 mg/dL  Borderline High:  150-199 mg/dL  High:  200-499 mg/dL  Very High:  Greater than or equal to 500 mg/dL    Direct Measure HDL  Female:  Greater than or equal to 50 mg/dL   Male:  Greater than or equal to 40 mg/dL    LDL Cholesterol  Desirable:  <100mg/dL  Above Desirable:  100-129 mg/dL   Borderline High:  130-159 mg/dL   High:  160-189 mg/dL   Very High:  >= 190 mg/dL    Non HDL Cholesterol  Desirable:  130 mg/dL  Above Desirable:  130-159 mg/dL  Borderline High:  160-189 mg/dL  High:  190-219 mg/dL  Very  High:  Greater than or equal to 220 mg/dL   ALT   Result Value Ref Range    ALT 41 0 - 50 U/L   TSH   Result Value Ref Range    TSH 1.67 0.40 - 4.00 mU/L   T4, free   Result Value Ref Range    Free T4 0.94 0.76 - 1.46 ng/dL   Albumin Random Urine Quantitative with Creat Ratio   Result Value Ref Range    Creatinine Urine mg/dL 20 mg/dL    Albumin Urine mg/L 149 mg/L    Albumin Urine mg/g Cr 745.00 (H) 0.00 - 25.00 mg/g Cr

## 2023-03-15 LAB
ALT SERPL W P-5'-P-CCNC: 41 U/L (ref 0–50)
ANION GAP SERPL CALCULATED.3IONS-SCNC: 6 MMOL/L (ref 3–14)
BUN SERPL-MCNC: 15 MG/DL (ref 7–30)
CALCIUM SERPL-MCNC: 9.3 MG/DL (ref 8.5–10.1)
CHLORIDE BLD-SCNC: 104 MMOL/L (ref 94–109)
CHOLEST SERPL-MCNC: 194 MG/DL
CO2 SERPL-SCNC: 28 MMOL/L (ref 20–32)
CREAT SERPL-MCNC: 0.72 MG/DL (ref 0.52–1.04)
CREAT UR-MCNC: 20 MG/DL
FASTING STATUS PATIENT QL REPORTED: NO
GFR SERPL CREATININE-BSD FRML MDRD: >90 ML/MIN/1.73M2
GLUCOSE BLD-MCNC: 172 MG/DL (ref 70–99)
HDLC SERPL-MCNC: 45 MG/DL
LDLC SERPL CALC-MCNC: 101 MG/DL
MICROALBUMIN UR-MCNC: 149 MG/L
MICROALBUMIN/CREAT UR: 745 MG/G CR (ref 0–25)
NONHDLC SERPL-MCNC: 149 MG/DL
POTASSIUM BLD-SCNC: 3.5 MMOL/L (ref 3.4–5.3)
SODIUM SERPL-SCNC: 138 MMOL/L (ref 133–144)
T4 FREE SERPL-MCNC: 0.94 NG/DL (ref 0.76–1.46)
TRIGL SERPL-MCNC: 238 MG/DL
TSH SERPL DL<=0.005 MIU/L-ACNC: 1.67 MU/L (ref 0.4–4)

## 2023-03-25 DIAGNOSIS — G47.00 PERSISTENT INSOMNIA: ICD-10-CM

## 2023-03-25 DIAGNOSIS — I10 HTN, GOAL BELOW 140/90: ICD-10-CM

## 2023-03-25 DIAGNOSIS — G47.9 SLEEP DISTURBANCE: ICD-10-CM

## 2023-03-27 RX ORDER — TRAZODONE HYDROCHLORIDE 50 MG/1
TABLET, FILM COATED ORAL
Qty: 90 TABLET | Refills: 1 | Status: SHIPPED | OUTPATIENT
Start: 2023-03-27 | End: 2023-11-29

## 2023-03-27 RX ORDER — DIPHENHYDRAMINE HCL 50 MG/1
CAPSULE ORAL
Qty: 24 CAPSULE | Refills: 0 | OUTPATIENT
Start: 2023-03-27

## 2023-04-03 ENCOUNTER — TELEPHONE (OUTPATIENT)
Dept: FAMILY MEDICINE | Facility: CLINIC | Age: 53
End: 2023-04-03
Payer: COMMERCIAL

## 2023-04-03 NOTE — LETTER
April 3, 2023    Lay Solis  [address] [place] [address]  [place] [address]    Dear Lay,    At [facility] we care about your health and are committed to providing quality patient care.     Here is a list of Health Maintenance topics that are due now or due soon:  Health Maintenance Due   Topic Date Due    ADVANCE CARE PLANNING  Never done    MAMMO SCREENING  Never done    URINALYSIS  Never done    COLORECTAL CANCER SCREENING  Never done    YEARLY PREVENTIVE VISIT  11/15/2019    ZOSTER IMMUNIZATION (1 of 2) Never done    COVID-19 Vaccine (4 - Booster for Moderna series) 05/11/2022    HEPATITIS B IMMUNIZATION (2 of 3 - 19+ 3-dose series) 06/29/2022    EYE EXAM  09/01/2022    INFLUENZA VACCINE (1) Never done    PHQ-2 (once per calendar year)  01/01/2023        We are recommending that you:     . Schedule an annual physical. Please contact our clinic at [phone].    Schedule a MAMMOGRAM which is due.    1 in 8 women will develop invasive breast cancer during her lifetime and it is the most common non-skin cancer in American women.  EARLY detection, new treatments, and a better understanding of the disease have increased survival rates - the 5 year survival rate in the 1960s was 63% and today it is close to 90%.    If you are under/uninsured, we recommend you contact the [facility]. They offer mammograms at no charge or on a sliding fee charge. You can schedule with them at [phone]. Please have them send us the results.      Please disregard this reminder if you have had this exam elsewhere within the last year.  It would be helpful for us to have a copy of your mammogram report in your file so that we can best coordinate your care - please contact us with when your test was done so we can update your record.    Schedule a COLONOSCOPY to assess for colon cancer (due every 10 years or 5 years in higher risk situations.)       Colon cancer is now the second leading cause of  cancer-related deaths in the [place] for both men and women and there are over 130,000 new cases and 50,000 deaths per year from colon cancer.  Colonoscopies can prevent 90-95% of these deaths.  Problem lesions can be removed before they ever become cancer.  This test is not only looking for cancer, but also getting rid of precancerious lesions.    If you are under/uninsured, we recommend you contact the [employer] program. [employer] is a free colorectal cancer screening program that provides colonoscopies for eligible under/uninsured [place] men and women. If you are interested in receiving a free colonoscopy, please call [employer] at [phone] (mention code [email or web address]) to see if you re eligible.     If you do not wish to do a colonoscopy or cannot afford to do one, at this time, there is another option. It is called a FIT test or Fecal Immunochemical Occult Blood Test (take home stool sample kit).  It does not replace the colonoscopy for colorectal cancer screening, but it can detect hidden bleeding in the lower colon.  It does need to be repeated every year and if a positive result is obtained, you would be referred for a colonoscopy.    If you have completed either one of these tests at another facility, please call/respond to this message with the details of when and where the tests were done and if they were normal or not. Or have the records sent to our clinic so that we can best coordinate your care.      Complete the attached Questionnaire:  You are due to complete the attached PHQ questionnaire. Please complete as soon as you are able.    Schedule a Nurse-Only appointment to update your immunizations: Your records indicate that you are not up to date with your immunizations, please schedule a nurse-only appointment to get these updated or update them at your next office visit. If this is incorrect, please disregard.    To schedule an appointment or discuss this further, you may contact us  by phone at the [facility] at [phone] or online through the [email or web address] @ [email or web address]            Thank you for trusting [facility] and we appreciate the opportunity to serve you.  We look forward to supporting your healthcare needs in the future.    Your partners in health,      Quality Committee at [facility]

## 2023-04-03 NOTE — TELEPHONE ENCOUNTER
Patient Quality Outreach    Patient is due for the following:   Colon Cancer Screening  Breast Cancer Screening - Mammogram  Depression  -  PHQ-9 needed  Physical Preventive Adult Physical      Topic Date Due     Zoster (Shingles) Vaccine (1 of 2) Never done     COVID-19 Vaccine (4 - Booster for Moderna series) 05/11/2022     Hepatitis B Vaccine (2 of 3 - 19+ 3-dose series) 06/29/2022     Flu Vaccine (1) Never done       Next Steps:   No follow up needed at this time.    Type of outreach:    Phone, left message for patient/parent to call back. and Sent letter.      Questions for provider review:    None     Carlota [place] MA

## 2023-05-05 ENCOUNTER — NURSE TRIAGE (OUTPATIENT)
Dept: NURSING | Facility: CLINIC | Age: 53
End: 2023-05-05
Payer: COMMERCIAL

## 2023-05-05 NOTE — TELEPHONE ENCOUNTER
Triage call   Dry cough dry throat sore throat for about 3 weeks she is coughing up a little blood tinged yellow sputum.  No fever or shortness of breath.    Per protocol see in office today.  Care advice given.  Verbalizes understanding and agrees with plan. Transferred to scheduling    Jasmin Peres RN   [facility] Nurse Advisor  11:23 AM 5/5/2023                  Reason for Disposition    Coughing up nahed-colored (reddish-brown) or blood-tinged sputum    Additional Information    Negative: Bluish (or gray) lips or face    Negative: SEVERE difficulty breathing (e.g., struggling for each breath, speaks in single words)    Negative: Rapid onset of cough and has hives    Negative: Coughing started suddenly after medicine, an allergic food or bee sting    Negative: Difficulty breathing after exposure to flames, smoke, or fumes    Negative: Sounds like a life-threatening emergency to the triager    Negative: Previous asthma attacks and this feels like asthma attack    Negative: Dry cough (non-productive; no sputum or minimal clear sputum) and within 14 days of COVID-19 Exposure    Negative: MODERATE difficulty breathing (e.g., speaks in phrases, SOB even at rest, pulse 100-120) and still present when not coughing    Negative: Chest pain present when not coughing    Negative: Passed out (i.e., fainted, collapsed and was not responding)    Negative: Patient sounds very sick or weak to the triager    Negative: Coughed up > 1 tablespoon (15 ml) blood (Exception: Blood-tinged sputum.)    Negative: MILD difficulty breathing (e.g., minimal/no SOB at rest, SOB with walking, pulse <100) and still present when not coughing    Negative: Fever > 103 F (39.4 C)    Negative: Fever > 101 F (38.3 C) and over 60 years of age    Negative: Fever > 100.0 F (37.8 C) and has diabetes mellitus or a weak immune system (e.g., HIV positive, cancer chemotherapy, organ transplant, splenectomy, chronic steroids)    Negative: Fever >  100.0 F (37.8 C) and bedridden (e.g., nursing home patient, stroke, chronic illness, recovering from surgery)    Negative: Increasing ankle swelling    Negative: Wheezing is present    Negative: SEVERE coughing spells (e.g., whooping sound after coughing, vomiting after coughing)    Protocols used: COUGH-A-OH

## 2023-06-29 ENCOUNTER — TELEPHONE (OUTPATIENT)
Dept: FAMILY MEDICINE | Facility: CLINIC | Age: 53
End: 2023-06-29
Payer: COMMERCIAL

## 2023-06-29 NOTE — TELEPHONE ENCOUNTER
Patient Quality Outreach    Patient is due for the following:   Hypertension -  BP check    Next Steps:   Schedule a nurse only visit for blood pressure check.     Type of outreach:    Sent letter.      Questions for provider review:    None           Carlota Galeana MA

## 2023-06-29 NOTE — LETTER
June 29, 2023    Lay Solis  [address] [place], [place] [address] [address]    Dear Lay,    At [facility] we care about your health and are committed to providing quality patient care.     Here is a list of Health Maintenance topics that are due now or due soon:  Health Maintenance Due   Topic Date Due    ADVANCE CARE PLANNING  Never done    MAMMO SCREENING  Never done    URINALYSIS  Never done    COLORECTAL CANCER SCREENING  Never done    YEARLY PREVENTIVE VISIT  11/15/2019    ZOSTER IMMUNIZATION (1 of 2) Never done    COVID-19 Vaccine (4 - Moderna series) 05/11/2022    HEPATITIS B IMMUNIZATION (2 of 3 - 19+ 3-dose series) 06/29/2022    EYE EXAM  09/01/2022    PHQ-2 (once per calendar year)  01/01/2023    DIABETIC FOOT EXAM  06/01/2023    Pneumococcal Vaccine: Pediatrics (0 to 5 Years) and At-Risk Patients (6 to 64 Years) (2 - PCV) 06/01/2023        We are recommending that you:  Hyptertension: If you have a home blood pressure monitor, please respond to this message with your latest home blood pressure reading. If you do not, please schedule a free blood pressure check with our clinic.    To schedule an appointment or discuss this further, you may contact us by phone at the [facility] at [phone] or online through the patient [email or web address] @ [email or web address]    Thank you for trusting [facility] and we appreciate the opportunity to serve you.  We look forward to supporting your healthcare needs in the future.    Your partners in health,      Quality Committee at [facility]

## 2023-08-07 DIAGNOSIS — R80.9 TYPE 2 DIABETES MELLITUS WITH MICROALBUMINURIA, WITHOUT LONG-TERM CURRENT USE OF INSULIN (H): ICD-10-CM

## 2023-08-07 DIAGNOSIS — E11.29 TYPE 2 DIABETES MELLITUS WITH MICROALBUMINURIA, WITHOUT LONG-TERM CURRENT USE OF INSULIN (H): ICD-10-CM

## 2023-08-07 NOTE — TELEPHONE ENCOUNTER
[facility]  [facility]: [place]        Social Work Care Navigator Note      Patient: Paula Ho  Date: December 7, 2021  Preferred Name: Paula    Previous PHQ-9:   PHQ-9 SCORE 7/15/2021 8/25/2021 9/20/2021   PHQ-9 Total Score 15 26 14     Previous LIBRA-7:   LIBRA-7 SCORE 7/15/2021 8/25/2021 9/20/2021   Total Score 19 21 8     ARNALDO LEVEL:  No flowsheet data found.    Preferred Contact:  Need for : No  Preferred Contact: Cell      Type of Contact Today: Phone call (not reached/unavailable)      Data: (subjective / Objective):  Attempted to reach patient for [facility] ([facility]) monthly check-in, but was unsuccessful, unable to leave message, as voicemail is full. Plan to attempt again.      Domonique Harris [facility]  [facility] ([facility])   [facility]  [phone]  December 7, 2021  9:20 AM            Next 5 appointments (look out 90 days)    Dec 09, 2021  7:00 AM  (Arrive by 6:45 AM)  Provider Visit with LEONA Anderson CNP  [facility] ([facility] [place] [facility] ) [address] [place] [id number] [phone]           Pending Prescriptions:                       Disp   Refills    blood glucose (ACCU-CHEK SMARTVIEW) test *100 st*1            Sig: TEST BLOOD SUGAR ONCE DAILY OR AS DIRECTED    Cherri Castellano CMA

## 2023-08-08 RX ORDER — BLOOD SUGAR DIAGNOSTIC
STRIP MISCELLANEOUS
Qty: 100 STRIP | Refills: 1 | Status: SHIPPED | OUTPATIENT
Start: 2023-08-08 | End: 2024-09-30

## 2023-11-29 DIAGNOSIS — G47.9 SLEEP DISTURBANCE: ICD-10-CM

## 2023-11-29 RX ORDER — TRAZODONE HYDROCHLORIDE 50 MG/1
TABLET, FILM COATED ORAL
Qty: 90 TABLET | Refills: 0 | Status: SHIPPED | OUTPATIENT
Start: 2023-11-29 | End: 2024-02-16

## 2023-12-07 ENCOUNTER — OFFICE VISIT (OUTPATIENT)
Dept: FAMILY MEDICINE | Facility: CLINIC | Age: 53
End: 2023-12-07
Payer: COMMERCIAL

## 2023-12-07 VITALS
RESPIRATION RATE: 18 BRPM | DIASTOLIC BLOOD PRESSURE: 80 MMHG | SYSTOLIC BLOOD PRESSURE: 132 MMHG | TEMPERATURE: 97.5 F | HEART RATE: 72 BPM | HEIGHT: 67 IN | WEIGHT: 168 LBS | BODY MASS INDEX: 26.37 KG/M2 | OXYGEN SATURATION: 98 %

## 2023-12-07 DIAGNOSIS — Z12.11 SCREEN FOR COLON CANCER: ICD-10-CM

## 2023-12-07 DIAGNOSIS — E11.21 TYPE 2 DIABETES MELLITUS WITH MACROALBUMINURIC DIABETIC NEPHROPATHY (H): ICD-10-CM

## 2023-12-07 DIAGNOSIS — Z12.31 VISIT FOR SCREENING MAMMOGRAM: Primary | ICD-10-CM

## 2023-12-07 DIAGNOSIS — I10 UNCONTROLLED HYPERTENSION: ICD-10-CM

## 2023-12-07 LAB — HBA1C MFR BLD: 9.5 % (ref 0–5.6)

## 2023-12-07 PROCEDURE — 36415 COLL VENOUS BLD VENIPUNCTURE: CPT | Performed by: STUDENT IN AN ORGANIZED HEALTH CARE EDUCATION/TRAINING PROGRAM

## 2023-12-07 PROCEDURE — 99000 SPECIMEN HANDLING OFFICE-LAB: CPT | Performed by: STUDENT IN AN ORGANIZED HEALTH CARE EDUCATION/TRAINING PROGRAM

## 2023-12-07 PROCEDURE — 84443 ASSAY THYROID STIM HORMONE: CPT | Performed by: INTERNAL MEDICINE

## 2023-12-07 PROCEDURE — 82088 ASSAY OF ALDOSTERONE: CPT | Performed by: STUDENT IN AN ORGANIZED HEALTH CARE EDUCATION/TRAINING PROGRAM

## 2023-12-07 PROCEDURE — 83036 HEMOGLOBIN GLYCOSYLATED A1C: CPT | Performed by: STUDENT IN AN ORGANIZED HEALTH CARE EDUCATION/TRAINING PROGRAM

## 2023-12-07 PROCEDURE — 84244 ASSAY OF RENIN: CPT | Mod: 90 | Performed by: STUDENT IN AN ORGANIZED HEALTH CARE EDUCATION/TRAINING PROGRAM

## 2023-12-07 PROCEDURE — 99214 OFFICE O/P EST MOD 30 MIN: CPT | Performed by: STUDENT IN AN ORGANIZED HEALTH CARE EDUCATION/TRAINING PROGRAM

## 2023-12-07 PROCEDURE — 84132 ASSAY OF SERUM POTASSIUM: CPT | Performed by: STUDENT IN AN ORGANIZED HEALTH CARE EDUCATION/TRAINING PROGRAM

## 2023-12-07 NOTE — PROGRESS NOTES
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 Prior A1c noted to be 7.9%. Pending labs today. Eye referral placed  Plan: Adult Eye  Referral, HEMOGLOBIN A1C            (I10) Uncontrolled hypertension  Comment: Chronic, uncontrolled. Extensive history of elevated BP. In office BP today 154/82. Repeat down to 132/80. At home blood pressures noted to be as high as 200 systolic and 100 diastolic. Pt is on 3 different blood pressure medications that are at their maximum and pt states she is compliant. Pending labs today for secondary causes of hypertension. Will also place Cardiology referral for further evaluation   Plan: Aldosterone, Renin activity, Aldosterone Renin         Ratio, Potassium, Adult Cardiology Eval          Referral            Dictation Disclaimer: Some of this Note has been completed with voice-recognition dictation software. Although errors are generally corrected real-time, there is the potential for a rare error to be present in the completed chart.                BMI:   Estimated body mass index is 26.68 kg/m  as calculated from the following:    Height as of this encounter: 1.69 m (5' 6.54\").    Weight as of this encounter: 76.2 kg (168 lb).   Weight management plan: Discussed healthy diet and exercise guidelines        ALLEN JOSÉ MD  [facility] [place] is a 53 year old, presenting for the following health issues:  Recheck Medication        12/7/2023     4:29 PM   Additional Questions   Roomed by Shy OLIVIA     The patient presents for evaluation of multiple medical concerns.    Her blood " pressure has been elevated for the past 21 years. She has not been seen by a cardiologist before. She had preeclampsia after she was pregnant. She checks her blood pressure at home and it is sometimes in the 200s systolic. She has a headache today. She is on 3 blood pressure medications.    Her diabetes has been better. She eats sweets. She has not made an appointment with the eye doctor yet.    She needs a colonoscopy.    Supplemental Information  She does not sleep well. She needs a refill on her trazodone.     Hypertension Follow-up    Do you check your blood pressure regularly outside of the clinic? Yes   Are you following a low salt diet? No  Are your blood pressures ever more than 140 on the top number (systolic) OR more   than 90 on the bottom number (diastolic), for example 140/90? Yes    Diabetes Follow-up    How often are you checking your blood sugar? Not at all  What concerns do you have today about your diabetes? None   Do you have any of these symptoms? (Select all that apply)  No numbness or tingling in feet.  No redness, sores or blisters on feet.  No complaints of excessive thirst.  No reports of blurry vision.  No significant changes to weight.  Have you had a diabetic eye exam in the last 12 months?  Patient is due and aware.         BP Readings from Last 2 Encounters:   12/07/23 (!) 154/82   03/14/23 (!) 142/82     Hemoglobin A1C (%)   Date Value   03/14/2023 7.9 (H)   04/12/2022 10.9 (H)   01/04/2021 6.4 (H)   09/03/2019 6.2 (H)     LDL Cholesterol Calculated (mg/dL)   Date Value   03/14/2023 101 (H)   04/12/2022 116 (H)   01/04/2021 127 (H)   09/03/2019 97             Hypothyroidism Follow-up    Since last visit, patient describes the following symptoms: Weight stable, no hair loss, no skin changes, no constipation, no loose stools        Review of Systems   CONSTITUTIONAL: NEGATIVE for fever, chills, change in weight  ENT/MOUTH: NEGATIVE for ear, mouth and throat problems  RESP: NEGATIVE for  "significant cough or SOB  CV: NEGATIVE for chest pain, palpitations or peripheral edema  NEURO: POSITIVE for headaches      Objective    BP (!) 154/82   Pulse 72   Temp 97.5  F (36.4  C) (Temporal)   Resp 18   Ht 1.69 m (5' 6.54\")   Wt 76.2 kg (168 lb)   LMP 01/02/2020 (Approximate)   SpO2 98%   PF (!) 9 L/min   BMI 26.68 kg/m    Body mass index is 26.68 kg/m .  Physical Exam   GENERAL: healthy, alert and no distress  EYES: Eyes grossly normal to inspection, PERRL and conjunctivae and sclerae normal  Neck: No visible JVD or lymphadenopathy   RESP: symmetrical rise in chest   CV: No peripheral edema notable   MS: no gross musculoskeletal defects noted  SKIN: no suspicious lesions or rashes  PSYCH: mentation appears normal, affect normal/bright      No results found for any visits on 12/07/23.                  "

## 2023-12-07 NOTE — LETTER
2023      Lay Solis  [address] [place] [id number]        Dear ,    We are writing to inform you of your test results. A1c increase from 7.9% to 9.5%  meaning your type 2 diabetes is not controlled. I have placed a referral for you to see a diabetes educator for improvement management along with a medical pharmacist. Your remaining labs for screening  elevated blood pressure were normal. Compliance in taking  blood pressure medication is important.         Resulted Orders   HEMOGLOBIN A1C   Result Value Ref Range    Hemoglobin A1C 9.5 (H) 0.0 - 5.6 %      Comment:      Normal <5.7%   Prediabetes 5.7-6.4%    Diabetes 6.5% or higher     Note: Adopted from ADA consensus guidelines.   Potassium   Result Value Ref Range    Potassium 3.6 3.4 - 5.3 mmol/L   Aldosterone   Result Value Ref Range    Aldosterone 12.0 0.0 - 31.0 ng/dL   Renin activity   Result Value Ref Range    Renin Activity 3.5 ng/mL/hr      Comment:      INTERPRETIVE INFORMATION: Renin Activity    Adult, Normal sodium diet:    Supine ................. 0.2-1.6 ng/mL/hr    Upright ................ 0.5-4.0 ng/mL/hr    Children, Normal sodium diet, Supine:    [place] (1-7 days) ..... 2.0-35.0 ng/mL/hr    Cord blood ............. 4.0-32.0 ng/mL/hr    1-12 mos ............... 2.4-37.0 ng/mL/hr    13 mos-3 yrs ........... 1.7-11.2 ng/mL/hr    4-5 yrs ................ 1.0- 6.5 ng/mL/hr    6-10 yrs ............... 0.5- 5.9 ng/mL/hr    11-15 yrs .............. 0.5- 3.3 ng/mL/hr    Children, normal sodium diet, Upright:    0-3 yrs ................ Not Available    4-5 yrs ................ Less than or equal to 15 ng/mL/hr    6-10 yrs ............... Less than or equal to 17 ng/mL/hr    11-15 yrs .............. Less than or equal to 16 ng/mL/hr    Plasma renin activity measures enzyme ability to convert   angiotensinogen to angiotensin I and is limited by the   availability of angiotensinogen. Plasma renin activity is   not an accurate  indicator  of enzyme activity when   angiotensinogen is decreased.    This test was developed and its performance characteristics   determined by [facility]. It has not been cleared or   approved by the US Food and Drug Administration. This test   was performed in a CLIA certified laboratory and is   intended for clinical purposes.  Performed By: [facility]  [address] [place] [id number]  : Haile Boswell MD, PhD  CLIA Number: [id number]   Aldosterone Renin Ratio   Result Value Ref Range    Aldosterone Renin Ratio 3.4 0.0 - 25.0       If you have any questions or concerns, please call the clinic at the number listed above.       Sincerely,      Nadege Del Rosario MD

## 2023-12-08 LAB
POTASSIUM SERPL-SCNC: 3.6 MMOL/L (ref 3.4–5.3)
TSH SERPL DL<=0.005 MIU/L-ACNC: 3.04 UIU/ML (ref 0.3–4.2)

## 2023-12-12 DIAGNOSIS — E11.21 TYPE 2 DIABETES MELLITUS WITH MACROALBUMINURIC DIABETIC NEPHROPATHY (H): Primary | ICD-10-CM

## 2023-12-12 LAB
ALDOST SERPL-MCNC: 12 NG/DL (ref 0–31)
ALDOST/RENIN PLAS-RTO: 3.4 {RATIO} (ref 0–25)
RENIN PLAS-CCNC: 3.5 NG/ML/HR

## 2023-12-13 ENCOUNTER — TELEPHONE (OUTPATIENT)
Dept: FAMILY MEDICINE | Facility: CLINIC | Age: 53
End: 2023-12-13
Payer: COMMERCIAL

## 2023-12-13 NOTE — LETTER
December 13, 2023    Lay Solis  [address] [place] [address]  [facility] [id number]    Dear Lay,    At [facility] we care about your health and are committed to providing quality patient care.     Here is a list of Health Maintenance topics that are due now or due soon:  Health Maintenance Due   Topic Date Due    ADVANCE CARE PLANNING  Never done    MAMMO SCREENING  Never done    COLORECTAL CANCER SCREENING  Never done    IPV IMMUNIZATION (2 of 3 - Adult catch-up series) 06/12/2012    YEARLY PREVENTIVE VISIT  11/15/2019    ZOSTER IMMUNIZATION (1 of 2) Never done    HEPATITIS B IMMUNIZATION (2 of 3 - 19+ 3-dose series) 06/29/2022    EYE EXAM  09/01/2022    PHQ-2 (once per calendar year)  01/01/2023    DIABETIC FOOT EXAM  06/01/2023    Pneumococcal Vaccine: Pediatrics (0 to 5 Years) and At-Risk Patients (6 to 64 Years) (2 - PCV) 06/01/2023    INFLUENZA VACCINE (1) Never done    COVID-19 Vaccine (4 - 2023-24 season) 09/01/2023        We are recommending that you:  Schedule a WELLNESS (Preventative/Physical) APPOINTMENT with your primary care provider. If you go elsewhere for your wellness appointments then please disregard this reminder    ,   Schedule a MAMMOGRAM which is due.    1 in 8 women will develop invasive breast cancer during her lifetime and it is the most common non-skin cancer in American women.  EARLY detection, new treatments, and a better understanding of the disease have increased survival rates - the 5 year survival rate in the 1960s was 63% and today it is close to 90%.    If you are under/uninsured, we recommend you contact the [facility]. They offer mammograms at no charge or on a sliding fee charge. You can schedule with them at [phone]. Please have them send us the results.      Please disregard this reminder if you have had this exam elsewhere within the last year.  It would be helpful for us to have a copy of your mammogram report in your file so that we can best  coordinate your care - please contact us with when your test was done so we can update your record.    ,   Schedule a COLONOSCOPY to assess for colon cancer (due every 10 years or 5 years in higher risk situations.)       Colon cancer is now the second leading cause of cancer-related deaths in the [place] for both men and women and there are over 130,000 new cases and 50,000 deaths per year from colon cancer.  Colonoscopies can prevent 90-95% of these deaths.  Problem lesions can be removed before they ever become cancer.  This test is not only looking for cancer, but also getting rid of precancerious lesions.    If you are under/uninsured, we recommend you contact the [employer] program. [employer] is a free colorectal cancer screening program that provides colonoscopies for eligible under/uninsured [place] men and women. If you are interested in receiving a free colonoscopy, please call [employer] at [phone] (mention code [employer]) to see if you re eligible.     If you do not wish to do a colonoscopy or cannot afford to do one, at this time, there is another option. It is called a FIT test or Fecal Immunochemical Occult Blood Test (take home stool sample kit).  It does not replace the colonoscopy for colorectal cancer screening, but it can detect hidden bleeding in the lower colon.  It does need to be repeated every year and if a positive result is obtained, you would be referred for a colonoscopy.    If you have completed either one of these tests at another facility, please call/respond to this message with the details of when and where the tests were done and if they were normal or not. Or have the records sent to our clinic so that we can best coordinate your care.,   Hyptertension: If you have a home blood pressure monitor, please respond to this message with your latest home blood pressure reading. If you do not, please schedule a free blood pressure check with our clinic.    ,    Diabetic  Eye Exam is due:  If this was done within the last 12 months then please contact the clinic or respond to this message with the date and location so we can update your records.    , and   Schedule a Nurse-Only appointment to update your immunizations: Your records indicate that you are not up to date with your immunizations, please schedule a nurse-only appointment to get these updated or update them at your next office visit. If this is incorrect, please disregard.    To schedule an appointment or discuss this further, you may contact us by phone at the [facility] at [phone] or online through the patient portal/[employer] @ [email or web address]    Thank you for trusting [facility] and we appreciate the opportunity to serve you.  We look forward to supporting your healthcare needs in the future.    Your partners in health,      Quality Committee at [facility]

## 2023-12-13 NOTE — TELEPHONE ENCOUNTER
Patient Quality Outreach    Patient is due for the following:   Diabetes -  Eye Exam and Diabetic Follow-Up Visit  Hypertension -  BP check  Colon Cancer Screening  Breast Cancer Screening - Mammogram  Physical Preventive Adult Physical      Topic Date Due    Polio Vaccine (2 of 3 - Adult catch-up series) 06/12/2012    Zoster (Shingles) Vaccine (1 of 2) Never done    Hepatitis B Vaccine (2 of 3 - 19+ 3-dose series) 06/29/2022    Pneumococcal Vaccine (2 - PCV) 06/01/2023    Flu Vaccine (1) Never done    COVID-19 Vaccine (4 - 2023-24 season) 09/01/2023       Next Steps:   Schedule a Adult Preventative    Type of outreach:    Sent letter.      Questions for provider review:    None           Carlota Galeana MA

## 2023-12-19 NOTE — PROGRESS NOTES
[facility]      Referring provider:Nadege Del Rosario MD     HPI: Ms. Lay Solis is a 53 year old  female with PMH significant for    -Diabetes type 2 (uncontrolled) with microalbuminuric diabetic nephropathy  -Hypertension, uncontrolled  -History of preeclampsia 20 years ago  -Hyperlipidemia  -History of thyroid cancer    Patient has history of hypertension.  She tells me that she had preeclampsia with her pregnancy 20 years ago.  She has been hypertensive since then.  She tells me that she is compliant with medications.  She does not check her blood pressure at home very regularly.  At times with pressure can be as high as 170/110 mmHg associated with chest pressure and dizziness.  No dizziness, syncope, shortness of breath.  Reports lower extremity edema on the right side but no edema on the left side.  Patient is referred to cardiology since she is only taking 3 blood pressure medications.  Recent lab tests including aldosterone renin ratio and potassium are normal.  Hemoglobin A1c is 9.5.  TSH is normal.  No history of cardiac disease.    Current medications:  Metoprolol 50 mg daily  Lisinopril 40 mg  Hydrochlorothiazide 25 mg  Metformin    Medications, personal, family, and social history reviewed with patient and revised.    PAST MEDICAL HISTORY:  Past Medical History:   Diagnosis Date    Cancer (H)     Diabetes (H)     Hypertension     Thyroid disease        CURRENT MEDICATIONS:  Current Outpatient Medications   Medication Sig Dispense Refill    atorvastatin (LIPITOR) 20 MG tablet Take 1 tablet (20 mg) by mouth daily (Patient not taking: Reported on 12/7/2023) 90 tablet 1    blood glucose (ACCU-CHEK SMARTVIEW) test strip TEST BLOOD SUGAR ONCE DAILY OR AS DIRECTED 100 strip 1    blood glucose monitoring (ACCU-CHEK FASTCLIX) lancets Use to test blood sugar 2 times daily or as directed. 102  "each 3    hydrochlorothiazide (HYDRODIURIL) 25 MG tablet Take 1 tablet (25 mg) by mouth daily 90 tablet 3    levothyroxine (SYNTHROID/LEVOTHROID) 175 MCG tablet Take 1 tablet (175 mcg) by mouth daily 90 tablet 1    lisinopril (ZESTRIL) 40 MG tablet Take 1 tablet (40 mg) by mouth daily 90 tablet 3    metFORMIN (GLUCOPHAGE XR) 500 MG 24 hr tablet Take 2 tablets (1,000 mg) by mouth daily (with breakfast) 180 tablet 1    metoprolol succinate ER (TOPROL XL) 50 MG 24 hr tablet Take 1 tablet (50 mg) by mouth daily 90 tablet 3    traZODone (DESYREL) 50 MG tablet TAKE ONE TABLET BY MOUTH AT BEDTIME 90 tablet 0       PAST SURGICAL HISTORY:  Past Surgical History:   Procedure Laterality Date    ENT SURGERY      partial thyroidectomy    GI SURGERY      cesearean section       ALLERGIES:   No Known Allergies    FAMILY HISTORY:  Family History   Problem Relation Age of Onset    Cerebrovascular Disease Mother     Heart Disease Mother     Glaucoma No family hx of     Macular Degeneration No family hx of          SOCIAL HISTORY:  Social History     Tobacco Use    Smoking status: Never    Smokeless tobacco: Never   Vaping Use    Vaping Use: Never used   Substance Use Topics    Alcohol use: No    Drug use: No       ROS:   Constitutional: No fever, chills, or sweats. Weight stable.   Cardiovascular: As per HPI.       Exam:  BP (!) 174/106   Pulse 110   Ht 1.689 m (5' 6.5\")   Wt 77.1 kg (170 lb)   LMP 01/02/2020 (Approximate)   SpO2 93%   BMI 27.03 kg/m    GENERAL APPEARANCE: alert and no distress  HEENT: no icterus, no central cyanosis  LYMPH/NECK: no adenopathy, no asymmetry, JVP not elevated  RESPIRATORY: lungs clear to auscultation - no rales, rhonchi or wheezes, no use of accessory muscles, no retractions, respirations are unlabored, normal respiratory rate  CARDIOVASCULAR: regular rhythm, normal S1, S2, no S3 or S4 and no murmur, click or rub, precordium quiet with normal PMI.  GI: soft, non tender  EXTREMITIES: 1+ edema on " the right side, no edema on the left side  NEURO: alert, normal speech,and affect  SKIN: no ecchymoses, no rashes     I have reviewed the labs and personally reviewed the imaging below and made my comment in the assessment and plan.    Labs:  CBC RESULTS:   Lab Results   Component Value Date    WBC 7.1 01/04/2021    RBC 4.51 01/04/2021    HGB 11.8 01/04/2021    HCT 36.8 01/04/2021    MCV 82 01/04/2021    MCH 26.2 (L) 01/04/2021    MCHC 32.1 01/04/2021    RDW 16.9 (H) 01/04/2021     01/04/2021       BMP RESULTS:  Lab Results   Component Value Date     03/14/2023     01/04/2021    POTASSIUM 3.6 12/07/2023    POTASSIUM 3.5 03/14/2023    POTASSIUM 3.4 01/04/2021    CHLORIDE 104 03/14/2023    CHLORIDE 99 01/04/2021    CO2 28 03/14/2023    CO2 29 01/04/2021    ANIONGAP 6 03/14/2023    ANIONGAP 6 01/04/2021     (H) 03/14/2023     (H) 01/04/2021    BUN 15 03/14/2023    BUN 10 01/04/2021    CR 0.72 03/14/2023    CR 0.79 01/04/2021    GFRESTIMATED >90 03/14/2023    GFRESTIMATED 87 01/04/2021    GFRESTBLACK >90 01/04/2021    ALEXANDER 9.3 03/14/2023    ALEXANDER 9.0 01/04/2021        Echocardiogram  No results found for this or any previous visit (from the past 8760 hour(s)).    Reviewed EKG in clinic today shows sinus rhythm and LVH.      Assessment and Plan:     # History of hypertension for over 20 years, currently not well-controlled.  -I reviewed EKG in clinic today.  LVH noted on EKG but no evidence of ischemia.  She reports dizziness and chest pressure with high blood pressure episodes.  # History of preeclampsia 20 years ago    Plan:  -Continue hydrochlorothiazide 25 mg  -Continue lisinopril l 40 mg  -Continue metoprolol 50 mg  -Start amlodipine 5 mg daily  -Cut down on salt  -Transthoracic echocardiogram to assess LVH.    # Diabetes type 2 uncontrolled  -She is only on metformin.  I think she needs endocrinology input.  She might benefit from GLP-1 agonist.  -Refer to endocrinology    #  Hyperlipidemia  -Patient is not taking atorvastatin.  LDL is 101.  -Start Crestor 10 mg    Return to clinic 3 months.    Total time spent today for this visit is 31 minutes including precharting, face-to-face clinic visit, review of labs/imaging and medical documentation.    Marsha VALERO MD  [facility] Division of Cardiology  Pager [phone]

## 2023-12-20 ENCOUNTER — OFFICE VISIT (OUTPATIENT)
Dept: CARDIOLOGY | Facility: CLINIC | Age: 53
End: 2023-12-20
Attending: STUDENT IN AN ORGANIZED HEALTH CARE EDUCATION/TRAINING PROGRAM
Payer: COMMERCIAL

## 2023-12-20 VITALS
HEIGHT: 67 IN | WEIGHT: 170 LBS | BODY MASS INDEX: 26.68 KG/M2 | HEART RATE: 75 BPM | SYSTOLIC BLOOD PRESSURE: 157 MMHG | DIASTOLIC BLOOD PRESSURE: 101 MMHG | OXYGEN SATURATION: 93 %

## 2023-12-20 DIAGNOSIS — I10 UNCONTROLLED HYPERTENSION: Primary | ICD-10-CM

## 2023-12-20 DIAGNOSIS — E11.9 TYPE 2 DIABETES, HBA1C GOAL < 7% (H): ICD-10-CM

## 2023-12-20 LAB
ATRIAL RATE - MUSE: 62 BPM
DIASTOLIC BLOOD PRESSURE - MUSE: NORMAL MMHG
INTERPRETATION ECG - MUSE: NORMAL
P AXIS - MUSE: 51 DEGREES
PR INTERVAL - MUSE: 172 MS
QRS DURATION - MUSE: 90 MS
QT - MUSE: 422 MS
QTC - MUSE: 428 MS
R AXIS - MUSE: -21 DEGREES
SYSTOLIC BLOOD PRESSURE - MUSE: NORMAL MMHG
T AXIS - MUSE: 0 DEGREES
VENTRICULAR RATE- MUSE: 62 BPM

## 2023-12-20 PROCEDURE — 93000 ELECTROCARDIOGRAM COMPLETE: CPT | Performed by: INTERNAL MEDICINE

## 2023-12-20 PROCEDURE — 99204 OFFICE O/P NEW MOD 45 MIN: CPT | Performed by: INTERNAL MEDICINE

## 2023-12-20 RX ORDER — AMLODIPINE BESYLATE 5 MG/1
5 TABLET ORAL DAILY
Qty: 90 TABLET | Refills: 3 | Status: SHIPPED | OUTPATIENT
Start: 2023-12-20 | End: 2024-03-19

## 2023-12-20 RX ORDER — ROSUVASTATIN CALCIUM 10 MG/1
10 TABLET, COATED ORAL DAILY
Qty: 90 TABLET | Refills: 3 | Status: SHIPPED | OUTPATIENT
Start: 2023-12-20

## 2023-12-20 NOTE — PATIENT INSTRUCTIONS
Thank you for coming to the [facility] at [place]; please note the following instructions:    1. EKG today.    2. Cardiology Providers: Dr. Marsha Sorto has ordered an echocardiogram to be performed in December/January.      Echocardiogram Instructions:  -Wear comfortable clothing  -Refrain from wearing perfumes or scented lotions    Please also call your insurance company for any billing questions regarding the echocardiogram.    3. A referral to see Endocrinology has been placed.    4. Please monitor your blood pressure over the next month and follow up with your primary care provider in about a month.    5. START: Amlodipine 5 mg daily. We also resent your prescription for crestor for your cholesterol.     6. Follow up in 3 months       If you have any questions regarding your visit, please contact your care team:     CARDIOLOGY  TELEPHONE NUMBER   Donna ENRIQUEZKasandra, Registered Nurse  Roxana RODRIGUEZ, Registered Nurse  Nelly MENDES, Registered Nurse  Eli SPARKS, Registered Medical Assistant  Alva HUANG, Certified Medical Assistant  Yecenia MERCADO, Visit Facilitator [phone] (select option 1)    *After hours: [phone]   For Scheduling Appts:     [phone] (select option 1)    *After hours: [phone]   For the Device Clinic (Pacemakers and ICD's)  Brook ANTUNEZ, Registered Nurse   During business hours: [phone]    *After business hours:  [phone] (select option 4)      Normal test result notifications will be released via [employer] or mailed within 7 business days.  All other test results, will be communicated via telephone once reviewed by your cardiologist.    If you need a medication refill, please contact your pharmacy.  Please allow 3 business days for your refill to be completed.    As always, thank you for trusting us with your health care needs!

## 2023-12-20 NOTE — LETTER
12/20/2023      RE: Lay Solis  [address] [facility] [address]       Dear Colleague,    Thank you for the opportunity to participate in the care of your patient, Lay Solis, at the [facility]. Please see a copy of my visit note below.                                                                                               General Cardiology Clinic-[place]      Referring provider:Nadege Del Rosario MD     HPI: Ms. Lay Solis is a 53 year old  female with PMH significant for    -Diabetes type 2 (uncontrolled) with microalbuminuric diabetic nephropathy  -Hypertension, uncontrolled  -History of preeclampsia 20 years ago  -Hyperlipidemia  -History of thyroid cancer    Patient has history of hypertension.  She tells me that she had preeclampsia with her pregnancy 20 years ago.  She has been hypertensive since then.  She tells me that she is compliant with medications.  She does not check her blood pressure at home very regularly.  At times with pressure can be as high as 170/110 mmHg associated with chest pressure and dizziness.  No dizziness, syncope, shortness of breath.  Reports lower extremity edema on the right side but no edema on the left side.  Patient is referred to cardiology since she is only taking 3 blood pressure medications.  Recent lab tests including aldosterone renin ratio and potassium are normal.  Hemoglobin A1c is 9.5.  TSH is normal.  No history of cardiac disease.    Current medications:  Metoprolol 50 mg daily  Lisinopril 40 mg  Hydrochlorothiazide 25 mg  Metformin    Medications, personal, family, and social history reviewed with patient and revised.    PAST MEDICAL HISTORY:  Past Medical History:   Diagnosis Date    Cancer (H)     Diabetes (H)     Hypertension     Thyroid disease        CURRENT MEDICATIONS:  Current Outpatient Medications   Medication Sig Dispense Refill     "atorvastatin (LIPITOR) 20 MG tablet Take 1 tablet (20 mg) by mouth daily (Patient not taking: Reported on 12/7/2023) 90 tablet 1    blood glucose (ACCU-CHEK SMARTVIEW) test strip TEST BLOOD SUGAR ONCE DAILY OR AS DIRECTED 100 strip 1    blood glucose monitoring (ACCU-CHEK FASTCLIX) lancets Use to test blood sugar 2 times daily or as directed. 102 each 3    hydrochlorothiazide (HYDRODIURIL) 25 MG tablet Take 1 tablet (25 mg) by mouth daily 90 tablet 3    levothyroxine (SYNTHROID/LEVOTHROID) 175 MCG tablet Take 1 tablet (175 mcg) by mouth daily 90 tablet 1    lisinopril (ZESTRIL) 40 MG tablet Take 1 tablet (40 mg) by mouth daily 90 tablet 3    metFORMIN (GLUCOPHAGE XR) 500 MG 24 hr tablet Take 2 tablets (1,000 mg) by mouth daily (with breakfast) 180 tablet 1    metoprolol succinate ER (TOPROL XL) 50 MG 24 hr tablet Take 1 tablet (50 mg) by mouth daily 90 tablet 3    traZODone (DESYREL) 50 MG tablet TAKE ONE TABLET BY MOUTH AT BEDTIME 90 tablet 0       PAST SURGICAL HISTORY:  Past Surgical History:   Procedure Laterality Date    ENT SURGERY      partial thyroidectomy    GI SURGERY      cesearean section       ALLERGIES:   No Known Allergies    FAMILY HISTORY:  Family History   Problem Relation Age of Onset    Cerebrovascular Disease Mother     Heart Disease Mother     Glaucoma No family hx of     Macular Degeneration No family hx of          SOCIAL HISTORY:  Social History     Tobacco Use    Smoking status: Never    Smokeless tobacco: Never   Vaping Use    Vaping Use: Never used   Substance Use Topics    Alcohol use: No    Drug use: No       ROS:   Constitutional: No fever, chills, or sweats. Weight stable.   Cardiovascular: As per HPI.       Exam:  BP (!) 174/106   Pulse 110   Ht 1.689 m (5' 6.5\")   Wt 77.1 kg (170 lb)   LMP 01/02/2020 (Approximate)   SpO2 93%   BMI 27.03 kg/m    GENERAL APPEARANCE: alert and no distress  HEENT: no icterus, no central cyanosis  LYMPH/NECK: no adenopathy, no asymmetry, JVP not " elevated  RESPIRATORY: lungs clear to auscultation - no rales, rhonchi or wheezes, no use of accessory muscles, no retractions, respirations are unlabored, normal respiratory rate  CARDIOVASCULAR: regular rhythm, normal S1, S2, no S3 or S4 and no murmur, click or rub, precordium quiet with normal PMI.  GI: soft, non tender  EXTREMITIES: 1+ edema on the right side, no edema on the left side  NEURO: alert, normal speech,and affect  SKIN: no ecchymoses, no rashes     I have reviewed the labs and personally reviewed the imaging below and made my comment in the assessment and plan.    Labs:  CBC RESULTS:   Lab Results   Component Value Date    WBC 7.1 01/04/2021    RBC 4.51 01/04/2021    HGB 11.8 01/04/2021    HCT 36.8 01/04/2021    MCV 82 01/04/2021    MCH 26.2 (L) 01/04/2021    MCHC 32.1 01/04/2021    RDW 16.9 (H) 01/04/2021     01/04/2021       BMP RESULTS:  Lab Results   Component Value Date     03/14/2023     01/04/2021    POTASSIUM 3.6 12/07/2023    POTASSIUM 3.5 03/14/2023    POTASSIUM 3.4 01/04/2021    CHLORIDE 104 03/14/2023    CHLORIDE 99 01/04/2021    CO2 28 03/14/2023    CO2 29 01/04/2021    ANIONGAP 6 03/14/2023    ANIONGAP 6 01/04/2021     (H) 03/14/2023     (H) 01/04/2021    BUN 15 03/14/2023    BUN 10 01/04/2021    CR 0.72 03/14/2023    CR 0.79 01/04/2021    GFRESTIMATED >90 03/14/2023    GFRESTIMATED 87 01/04/2021    GFRESTBLACK >90 01/04/2021    ALEXANDER 9.3 03/14/2023    ALEXANDER 9.0 01/04/2021        Echocardiogram  No results found for this or any previous visit (from the past 8760 hour(s)).    Reviewed EKG in clinic today shows sinus rhythm and LVH.      Assessment and Plan:     # History of hypertension for over 20 years, currently not well-controlled.  -I reviewed EKG in clinic today.  LVH noted on EKG but no evidence of ischemia.  She reports dizziness and chest pressure with high blood pressure episodes.  # History of preeclampsia 20 years ago    Plan:  -Continue  hydrochlorothiazide 25 mg  -Continue lisinopril l 40 mg  -Continue metoprolol 50 mg  -Start amlodipine 5 mg daily  -Cut down on salt  -Transthoracic echocardiogram to assess LVH.    # Diabetes type 2 uncontrolled  -She is only on metformin.  I think she needs endocrinology input.  She might benefit from GLP-1 agonist.  -Refer to endocrinology    # Hyperlipidemia  -Patient is not taking atorvastatin.  LDL is 101.  -Start Crestor 10 mg    Return to clinic 3 months.    Total time spent today for this visit is 31 minutes including precharting, face-to-face clinic visit, review of labs/imaging and medical documentation.    Marsha VALERO MD  [facility] Division of Cardiology  Pager [phone]

## 2023-12-20 NOTE — NURSING NOTE
"Chief Complaint   Patient presents with    New Patient     Uncontrolled HTN       Initial BP (!) 174/106   Pulse 110   Ht 1.689 m (5' 6.5\")   Wt 77.1 kg (170 lb)   LMP 01/02/2020 (Approximate)   SpO2 93%   BMI 27.03 kg/m   Estimated body mass index is 27.03 kg/m  as calculated from the following:    Height as of this encounter: 1.689 m (5' 6.5\").    Weight as of this encounter: 77.1 kg (170 lb)..  BP completed using cuff size: yasmani Hunter CMA ([facility])    "

## 2024-01-09 ENCOUNTER — ANCILLARY PROCEDURE (OUTPATIENT)
Dept: CARDIOLOGY | Facility: CLINIC | Age: 54
End: 2024-01-09
Attending: INTERNAL MEDICINE
Payer: COMMERCIAL

## 2024-01-09 DIAGNOSIS — I10 UNCONTROLLED HYPERTENSION: ICD-10-CM

## 2024-01-09 LAB — LVEF ECHO: NORMAL

## 2024-01-09 PROCEDURE — 93306 TTE W/DOPPLER COMPLETE: CPT | Performed by: INTERNAL MEDICINE

## 2024-01-10 ENCOUNTER — TELEPHONE (OUTPATIENT)
Dept: CARDIOLOGY | Facility: CLINIC | Age: 54
End: 2024-01-10
Payer: COMMERCIAL

## 2024-01-10 NOTE — TELEPHONE ENCOUNTER
Marsha Valero MD  P  Cardiology  [facility],    Your echocardiogram shows that your heart muscle is thicker than a normal heart due to high blood pressure for a long time.  Also there is slight/borderline decline in your heart squeezing function.  How is your blood pressure at home these days?  Are you monitoring Since we added this new medication called amlodipine 5 mg.  You need to monitor blood pressure and make sure that your blood pressure is less than 130/80 mmHg at home.  If not then we need to adjust your medications.  Will see you in clinic in 3 months.  Let me know if your blood pressure still over 130/80 mmHg.    DR VALERO    Attempted to call patient. Left message for patient.    Roxana Lindquist, RN, BSN  Cardiology RN Care Coordinator   [place]   Phone: [phone]  Fax: [phone] ([place]) [phone] ([place])

## 2024-01-11 NOTE — TELEPHONE ENCOUNTER
Attempted to contact patient, daughter answered and states that at this time patient is not available and will call clinic back when convenient to patient.  Will wait for return call.    Donna Cooper RN  Cardiology Care Coordinator  [facility]  [phone] option 1

## 2024-01-16 ENCOUNTER — TELEPHONE (OUTPATIENT)
Dept: CARDIOLOGY | Facility: CLINIC | Age: 54
End: 2024-01-16
Payer: COMMERCIAL

## 2024-01-16 NOTE — TELEPHONE ENCOUNTER
[facility] Call Center    Phone Message    May a detailed message be left on voicemail: yes     Reason for Call: Other: Pt would like a call back to discuss results from Echo     Action Taken: Other: Cardiology    Travel Screening: Not Applicable    Thank you!  [facility]

## 2024-01-18 NOTE — TELEPHONE ENCOUNTER
Attempted to contact patient, no answer, vm is full.  Will try again at a later time.    Donna Cooper, RN  Cardiology Care Coordinator  [facility]  [phone] option 1

## 2024-02-13 DIAGNOSIS — E89.0 POSTSURGICAL HYPOTHYROIDISM: ICD-10-CM

## 2024-02-14 RX ORDER — LEVOTHYROXINE SODIUM 175 UG/1
175 TABLET ORAL DAILY
Qty: 90 TABLET | Refills: 3 | Status: SHIPPED | OUTPATIENT
Start: 2024-02-14

## 2024-02-15 DIAGNOSIS — G47.9 SLEEP DISTURBANCE: ICD-10-CM

## 2024-02-15 DIAGNOSIS — E11.21 TYPE 2 DIABETES MELLITUS WITH MACROALBUMINURIC DIABETIC NEPHROPATHY (H): Primary | ICD-10-CM

## 2024-02-16 ENCOUNTER — TELEPHONE (OUTPATIENT)
Dept: FAMILY MEDICINE | Facility: CLINIC | Age: 54
End: 2024-02-16
Payer: COMMERCIAL

## 2024-02-16 ENCOUNTER — TELEPHONE (OUTPATIENT)
Dept: EDUCATION SERVICES | Facility: CLINIC | Age: 54
End: 2024-02-16
Payer: COMMERCIAL

## 2024-02-16 DIAGNOSIS — I10 HTN, GOAL BELOW 140/90: ICD-10-CM

## 2024-02-16 RX ORDER — METOPROLOL SUCCINATE 50 MG/1
50 TABLET, EXTENDED RELEASE ORAL DAILY
Qty: 90 TABLET | Refills: 3 | Status: CANCELLED | OUTPATIENT
Start: 2024-02-16

## 2024-02-16 RX ORDER — LISINOPRIL 40 MG/1
40 TABLET ORAL DAILY
Qty: 90 TABLET | Refills: 3 | Status: CANCELLED | OUTPATIENT
Start: 2024-02-16

## 2024-02-16 RX ORDER — HYDROCHLOROTHIAZIDE 25 MG/1
25 TABLET ORAL DAILY
Qty: 90 TABLET | Refills: 3 | Status: CANCELLED | OUTPATIENT
Start: 2024-02-16

## 2024-02-16 RX ORDER — TRAZODONE HYDROCHLORIDE 50 MG/1
TABLET, FILM COATED ORAL
Qty: 90 TABLET | Refills: 0 | Status: SHIPPED | OUTPATIENT
Start: 2024-02-16 | End: 2024-05-06

## 2024-02-16 RX ORDER — BLOOD-GLUCOSE METER
EACH MISCELLANEOUS
Qty: 1 KIT | Refills: 1 | Status: SHIPPED | OUTPATIENT
Start: 2024-02-16

## 2024-02-16 NOTE — TELEPHONE ENCOUNTER
"Pt left a message that she is needing to reschedule a missed diabetes education appt. She also notes that her \"machine\" is not working. She is requesting a call back to schedule.      "

## 2024-02-16 NOTE — TELEPHONE ENCOUNTER
RN received a call from patient.     She would like to schedule an appointment at [place] to follow up on blood pressure medications. It sounds like amlodipine might have caused her legs to be swollen after she started taking it in December 2023. It sounds like she is no longer taking it?     She is requesting refills for hydrochlorothiazide, lisinopril, metoprolol, and trazodone.     She denies leg swelling at this time.     She would like to go to [facility] as it is closer to home.     RN offered to warm transfer to [place] to schedule, she prefers a call back at [phone].     After hanging up with patient. RN notes patient was referred to Cardiology and met with Dr. Sorto 12/20/23 at [facility] in [place].     Routing to Dr. Del Rosario to please review and advise if patient BP medications can be refilled, if she should schedule with Cardiology for BP medication changes if needed. She would like to have Dr. Del Rosario be her PCP going forward.    Luly Mays, RN, BSN, PHN  [facility]  Nurse Triage, Family Practice

## 2024-02-19 NOTE — TELEPHONE ENCOUNTER
Provider's message relayed to pt. Confirmed pt's appt for 3/19 with cardiology.     Routing refill request to cardiology team.    Luly Celis RN  [facility]

## 2024-02-19 NOTE — TELEPHONE ENCOUNTER
I can be patient's PCP however due to her uncontrolled hypertension I would like her followed by Cardiology and medication management with them so she should inform them of the leg swelling. It is noted that she does have an upcoming appointment with Cardiology in March 16th    [facility] [place]

## 2024-02-21 ENCOUNTER — TELEPHONE (OUTPATIENT)
Dept: CARDIOLOGY | Facility: CLINIC | Age: 54
End: 2024-02-21
Payer: COMMERCIAL

## 2024-02-21 DIAGNOSIS — I10 HTN, GOAL BELOW 140/90: ICD-10-CM

## 2024-02-21 RX ORDER — METOPROLOL SUCCINATE 50 MG/1
50 TABLET, EXTENDED RELEASE ORAL DAILY
Qty: 90 TABLET | Refills: 1 | Status: SHIPPED | OUTPATIENT
Start: 2024-02-21 | End: 2024-03-19

## 2024-02-21 RX ORDER — HYDROCHLOROTHIAZIDE 25 MG/1
25 TABLET ORAL DAILY
Qty: 90 TABLET | Refills: 1 | Status: SHIPPED | OUTPATIENT
Start: 2024-02-21 | End: 2024-03-19

## 2024-02-21 RX ORDER — LISINOPRIL 40 MG/1
40 TABLET ORAL DAILY
Qty: 90 TABLET | Refills: 1 | Status: SHIPPED | OUTPATIENT
Start: 2024-02-21 | End: 2024-03-19

## 2024-02-21 NOTE — TELEPHONE ENCOUNTER
Rx refilled.    Donna Cooper, RN  Cardiology Care Coordinator  [facility]  [phone] option 1

## 2024-03-19 ENCOUNTER — OFFICE VISIT (OUTPATIENT)
Dept: CARDIOLOGY | Facility: CLINIC | Age: 54
End: 2024-03-19
Payer: COMMERCIAL

## 2024-03-19 VITALS
HEART RATE: 77 BPM | OXYGEN SATURATION: 99 % | BODY MASS INDEX: 25.76 KG/M2 | WEIGHT: 162 LBS | SYSTOLIC BLOOD PRESSURE: 151 MMHG | DIASTOLIC BLOOD PRESSURE: 95 MMHG

## 2024-03-19 DIAGNOSIS — I10 PRIMARY HYPERTENSION: ICD-10-CM

## 2024-03-19 DIAGNOSIS — I10 HYPERTENSION, UNCONTROLLED: Primary | ICD-10-CM

## 2024-03-19 DIAGNOSIS — E11.9 TYPE 2 DIABETES, HBA1C GOAL < 7% (H): ICD-10-CM

## 2024-03-19 DIAGNOSIS — I10 HTN, GOAL BELOW 140/90: ICD-10-CM

## 2024-03-19 PROCEDURE — 99214 OFFICE O/P EST MOD 30 MIN: CPT | Performed by: INTERNAL MEDICINE

## 2024-03-19 RX ORDER — LISINOPRIL 40 MG/1
40 TABLET ORAL DAILY
Qty: 90 TABLET | Refills: 3 | Status: SHIPPED | OUTPATIENT
Start: 2024-03-19

## 2024-03-19 RX ORDER — SPIRONOLACTONE 25 MG/1
25 TABLET ORAL DAILY
Qty: 90 TABLET | Refills: 3 | Status: SHIPPED | OUTPATIENT
Start: 2024-03-19

## 2024-03-19 RX ORDER — LABETALOL 100 MG/1
100 TABLET, FILM COATED ORAL 2 TIMES DAILY
Qty: 180 TABLET | Refills: 3 | Status: SHIPPED | OUTPATIENT
Start: 2024-03-19

## 2024-03-19 RX ORDER — HYDROCHLOROTHIAZIDE 25 MG/1
25 TABLET ORAL DAILY
Qty: 90 TABLET | Refills: 3 | Status: SHIPPED | OUTPATIENT
Start: 2024-03-19

## 2024-03-19 NOTE — NURSING NOTE
"Chief Complaint   Patient presents with    RECHECK     Return general cardiology for 3 month follow-up       Initial BP (!) 165/100 (BP Location: Right arm, Patient Position: Chair, Cuff Size: Adult Regular)   Pulse 80   Wt 73.5 kg (162 lb)   LMP 01/02/2020 (Approximate)   SpO2 99%   BMI 25.76 kg/m   Estimated body mass index is 25.76 kg/m  as calculated from the following:    Height as of 12/20/23: 1.689 m (5' 6.5\").    Weight as of this encounter: 73.5 kg (162 lb)..  BP completed using cuff size: regular    GWEN Benedict    "

## 2024-03-19 NOTE — PROGRESS NOTES
[facility]      Referring provider:Nadege Del Rosario MD     HPI: Ms. Lay Solis is a 53 year old  female with PMH significant for    -Diabetes type 2 (uncontrolled) with microalbuminuric diabetic nephropathy  -Hypertension, uncontrolled  -History of preeclampsia 20 years ago  -Hyperlipidemia  -History of thyroid cancer    Patient has history of hypertension.  She tells me that she had preeclampsia with her pregnancy 20 years ago.  She has been hypertensive since then.  She tells me that she is compliant with medications.  She does not check her blood pressure at home very regularly.  At times with pressure can be as high as 170/110 mmHg associated with chest pressure and dizziness.  No dizziness, syncope, shortness of breath.  Reports lower extremity edema on the right side but no edema on the left side.  Patient is referred to cardiology since she is only taking 3 blood pressure medications.  Recent lab tests including aldosterone renin ratio and potassium are normal.  Hemoglobin A1c is 9.5.  TSH is normal.  No history of cardiac disease.    Current medications:  Metoprolol 50 mg daily  Lisinopril 40 mg  Hydrochlorothiazide 25 mg  Metformin    Medications, personal, family, and social history reviewed with patient and revised.    Interval history 3/19/2024:  Patient is being seen today for follow-up on blood pressure management.  She tells me that she could not tolerate amlodipine 5 mg due to lower extremity swelling.  She tells me that the swelling went up beyond her knees all the way up to her thighs.  She stopped 2 weeks ago.  Lower extremity edema resolved since.  Blood pressure still high.  Echocardiogram showed LVEF 50 to 55% with mild LVH.  She is currently asymptomatic.    PAST MEDICAL HISTORY:  Past Medical History:   Diagnosis Date    Cancer (H)     Diabetes (H)     Hypertension      Thyroid disease        CURRENT MEDICATIONS:  Current Outpatient Medications   Medication Sig Dispense Refill    hydrochlorothiazide (HYDRODIURIL) 25 MG tablet Take 1 tablet (25 mg) by mouth daily 90 tablet 1    levothyroxine (SYNTHROID/LEVOTHROID) 175 MCG tablet TAKE ONE TABLET BY MOUTH ONE TIME DAILY 90 tablet 3    lisinopril (ZESTRIL) 40 MG tablet Take 1 tablet (40 mg) by mouth daily 90 tablet 1    metFORMIN (GLUCOPHAGE XR) 500 MG 24 hr tablet Take 2 tablets (1,000 mg) by mouth daily (with breakfast) 180 tablet 1    metoprolol succinate ER (TOPROL XL) 50 MG 24 hr tablet Take 1 tablet (50 mg) by mouth daily 90 tablet 1    rosuvastatin (CRESTOR) 10 MG tablet Take 1 tablet (10 mg) by mouth daily 90 tablet 3    traZODone (DESYREL) 50 MG tablet TAKE ONE TABLET BY MOUTH AT BEDTIME 90 tablet 0    amLODIPine (NORVASC) 5 MG tablet Take 1 tablet (5 mg) by mouth daily (Patient not taking: Reported on 3/19/2024) 90 tablet 3    blood glucose (ACCU-CHEK SMARTVIEW) test strip TEST BLOOD SUGAR ONCE DAILY OR AS DIRECTED 100 strip 1    blood glucose monitoring (ACCU-CHEK FASTCLIX) lancets Use to test blood sugar 2 times daily or as directed. 102 each 3    blood glucose monitoring (ONETOUCH VERIO) meter device kit Use to test blood sugar 3 times daily or as directed. 1 kit 1       PAST SURGICAL HISTORY:  Past Surgical History:   Procedure Laterality Date    ENT SURGERY      partial thyroidectomy    GI SURGERY      cesearean section       ALLERGIES:   No Known Allergies    FAMILY HISTORY:  Family History   Problem Relation Age of Onset    Hypertension Mother     Cerebrovascular Disease Mother     Heart Disease Mother     Glaucoma No family hx of     Macular Degeneration No family hx of          SOCIAL HISTORY:  Social History     Tobacco Use    Smoking status: Never    Smokeless tobacco: Never   Vaping Use    Vaping Use: Never used   Substance Use Topics    Alcohol use: No    Drug use: No       ROS:   Constitutional: No fever,  chills, or sweats. Weight stable.   Cardiovascular: As per HPI.       Exam:  BP (!) 165/100 (BP Location: Right arm, Patient Position: Chair, Cuff Size: Adult Regular)   Pulse 80   Wt 73.5 kg (162 lb)   LMP 01/02/2020 (Approximate)   SpO2 99%   BMI 25.76 kg/m    GENERAL APPEARANCE: alert and no distress  HEENT: no icterus, no central cyanosis  LYMPH/NECK: no adenopathy, no asymmetry, JVP not elevated  CARDIOVASCULAR: regular rhythm, normal S1, S2, no S3 or S4 and no murmur, click or rub, precordium quiet with normal PMI.  GI: soft, non tender  EXTREMITIES: No edema.  NEURO: alert, normal speech,and affect  SKIN: no ecchymoses, no rashes     I have reviewed the labs and personally reviewed the imaging below and made my comment in the assessment and plan.    Labs:  CBC RESULTS:   Lab Results   Component Value Date    WBC 7.1 01/04/2021    RBC 4.51 01/04/2021    HGB 11.8 01/04/2021    HCT 36.8 01/04/2021    MCV 82 01/04/2021    MCH 26.2 (L) 01/04/2021    MCHC 32.1 01/04/2021    RDW 16.9 (H) 01/04/2021     01/04/2021       BMP RESULTS:  Lab Results   Component Value Date     03/14/2023     01/04/2021    POTASSIUM 3.6 12/07/2023    POTASSIUM 3.5 03/14/2023    POTASSIUM 3.4 01/04/2021    CHLORIDE 104 03/14/2023    CHLORIDE 99 01/04/2021    CO2 28 03/14/2023    CO2 29 01/04/2021    ANIONGAP 6 03/14/2023    ANIONGAP 6 01/04/2021     (H) 03/14/2023     (H) 01/04/2021    BUN 15 03/14/2023    BUN 10 01/04/2021    CR 0.72 03/14/2023    CR 0.79 01/04/2021    GFRESTIMATED >90 03/14/2023    GFRESTIMATED 87 01/04/2021    GFRESTBLACK >90 01/04/2021    ALEXANDER 9.3 03/14/2023    ALEXANDER 9.0 01/04/2021        Echocardiogram 1/9/2024  Left ventricular function is decreased. The ejection fraction is 50-55%  (borderline).  Global right ventricular function is normal.  No significant valvular abnormalities present.  Pulmonary artery systolic pressure is normal.  Estimated mean right atrial pressure is  normal.  No pericardial effusion is present.    Reviewed EKG in clinic today shows sinus rhythm and LVH.      Assessment and Plan:     # History of hypertension for over 20 years, currently not well-controlled  # History of preeclampsia 20 years ago  # Proteinuria (745 mg/gram creatinine)  Patient is currently asymptomatic.  Echocardiogram showed LVEF 50 to 55% with mild LVH.  I think this is consistent with longstanding hypertension.  Aldosterone renin ratio is normal.    Plan:  -Continue hydrochlorothiazide 25 mg  -Continue lisinopril l 40 mg  -Stop metoprolol  -Start labetalol 100 mg twice daily  -Start spironolactone 25 mg daily  -BMP in 2 weeks  -Patient stopped amlodipine due to lower extremity edema  -Cut down on salt    # Diabetes type 2 uncontrolled  -She is only on metformin.  I referred her to endocrinology during my previous visit 3 months ago.  She has not scheduled appointment yet.    # Hyperlipidemia  -Patient is not taking atorvastatin.  LDL is 101.  -Start Crestor 10 mg    Return to clinic 3 months.    Total time spent today for this visit is 26 minutes including precharting, face-to-face clinic visit, review of labs/imaging and medical documentation.    Marsha VALERO MD  [facility] Division of Cardiology  Pager [phone]

## 2024-03-19 NOTE — PATIENT INSTRUCTIONS
Thank you for coming to the [facility]; please note the following instructions:    1. STOP Metoprolol    2. START labetalol (NORMODYNE) 100 MG tablet. Take 1 tablet (100 mg) by mouth 2 times daily     3. START spironolactone (ALDACTONE) 25 MG tablet. Take 1 tablet (25 mg) by mouth daily     4. BMP in two weeks    5. Follow-up with Dr. Sorto in 3-4 months        If you have any questions regarding your visit please contact your care team:     Cardiology  Telephone Number   Donna ENRIQUEZ., RN  Roxana RODRIGUEZ, RN  Nelly MENDES, RN  Kadi KNOWLES, RMA  Eli SPARKS, RMA  Yecenia MERCADO, Visit Facilitator  Alva HUANG [facility] [phone] (option 1)   For scheduling appts:     [phone] (select option 1)       For the Device Clinic (Pacemakers and ICD's)  RN's :  Brook Henry   During business hours: [phone]    *After business hours:  [phone] (select option 4)      Normal test result notifications will be released via [employer] or mailed within 7 business days.  All other test results, will be communicated via telephone once reviewed by your cardiologist.    If you need a medication refill please contact your pharmacy.  Please allow 3 business days for your refill to be completed.    As always, thank you for trusting us with your health care needs!

## 2024-03-19 NOTE — LETTER
3/19/2024      RE: Lay Solis  [address] [facility] [address]       Dear Colleague,    Thank you for the opportunity to participate in the care of your patient, Lay Solis, at the [facility]. Please see a copy of my visit note below.                                                                                               General Cardiology Clinic[facility]      Referring provider:Nadege Del Rosario MD     HPI: Ms. Lay Solis is a 53 year old  female with PMH significant for    -Diabetes type 2 (uncontrolled) with microalbuminuric diabetic nephropathy  -Hypertension, uncontrolled  -History of preeclampsia 20 years ago  -Hyperlipidemia  -History of thyroid cancer    Patient has history of hypertension.  She tells me that she had preeclampsia with her pregnancy 20 years ago.  She has been hypertensive since then.  She tells me that she is compliant with medications.  She does not check her blood pressure at home very regularly.  At times with pressure can be as high as 170/110 mmHg associated with chest pressure and dizziness.  No dizziness, syncope, shortness of breath.  Reports lower extremity edema on the right side but no edema on the left side.  Patient is referred to cardiology since she is only taking 3 blood pressure medications.  Recent lab tests including aldosterone renin ratio and potassium are normal.  Hemoglobin A1c is 9.5.  TSH is normal.  No history of cardiac disease.    Current medications:  Metoprolol 50 mg daily  Lisinopril 40 mg  Hydrochlorothiazide 25 mg  Metformin    Medications, personal, family, and social history reviewed with patient and revised.    Interval history 3/19/2024:  Patient is being seen today for follow-up on blood pressure management.  She tells me that she could not tolerate amlodipine 5 mg due to lower extremity swelling.  She tells me that the swelling went up beyond  her knees all the way up to her thighs.  She stopped 2 weeks ago.  Lower extremity edema resolved since.  Blood pressure still high.  Echocardiogram showed LVEF 50 to 55% with mild LVH.  She is currently asymptomatic.    PAST MEDICAL HISTORY:  Past Medical History:   Diagnosis Date    Cancer (H)     Diabetes (H)     Hypertension     Thyroid disease        CURRENT MEDICATIONS:  Current Outpatient Medications   Medication Sig Dispense Refill    hydrochlorothiazide (HYDRODIURIL) 25 MG tablet Take 1 tablet (25 mg) by mouth daily 90 tablet 1    levothyroxine (SYNTHROID/LEVOTHROID) 175 MCG tablet TAKE ONE TABLET BY MOUTH ONE TIME DAILY 90 tablet 3    lisinopril (ZESTRIL) 40 MG tablet Take 1 tablet (40 mg) by mouth daily 90 tablet 1    metFORMIN (GLUCOPHAGE XR) 500 MG 24 hr tablet Take 2 tablets (1,000 mg) by mouth daily (with breakfast) 180 tablet 1    metoprolol succinate ER (TOPROL XL) 50 MG 24 hr tablet Take 1 tablet (50 mg) by mouth daily 90 tablet 1    rosuvastatin (CRESTOR) 10 MG tablet Take 1 tablet (10 mg) by mouth daily 90 tablet 3    traZODone (DESYREL) 50 MG tablet TAKE ONE TABLET BY MOUTH AT BEDTIME 90 tablet 0    amLODIPine (NORVASC) 5 MG tablet Take 1 tablet (5 mg) by mouth daily (Patient not taking: Reported on 3/19/2024) 90 tablet 3    blood glucose (ACCU-CHEK SMARTVIEW) test strip TEST BLOOD SUGAR ONCE DAILY OR AS DIRECTED 100 strip 1    blood glucose monitoring (ACCU-CHEK FASTCLIX) lancets Use to test blood sugar 2 times daily or as directed. 102 each 3    blood glucose monitoring (ONETOUCH VERIO) meter device kit Use to test blood sugar 3 times daily or as directed. 1 kit 1       PAST SURGICAL HISTORY:  Past Surgical History:   Procedure Laterality Date    ENT SURGERY      partial thyroidectomy    GI SURGERY      cesearean section       ALLERGIES:   No Known Allergies    FAMILY HISTORY:  Family History   Problem Relation Age of Onset    Hypertension Mother     Cerebrovascular Disease Mother     Heart  Disease Mother     Glaucoma No family hx of     Macular Degeneration No family hx of          SOCIAL HISTORY:  Social History     Tobacco Use    Smoking status: Never    Smokeless tobacco: Never   Vaping Use    Vaping Use: Never used   Substance Use Topics    Alcohol use: No    Drug use: No       ROS:   Constitutional: No fever, chills, or sweats. Weight stable.   Cardiovascular: As per HPI.       Exam:  BP (!) 165/100 (BP Location: Right arm, Patient Position: Chair, Cuff Size: Adult Regular)   Pulse 80   Wt 73.5 kg (162 lb)   LMP 01/02/2020 (Approximate)   SpO2 99%   BMI 25.76 kg/m    GENERAL APPEARANCE: alert and no distress  HEENT: no icterus, no central cyanosis  LYMPH/NECK: no adenopathy, no asymmetry, JVP not elevated  CARDIOVASCULAR: regular rhythm, normal S1, S2, no S3 or S4 and no murmur, click or rub, precordium quiet with normal PMI.  GI: soft, non tender  EXTREMITIES: No edema.  NEURO: alert, normal speech,and affect  SKIN: no ecchymoses, no rashes     I have reviewed the labs and personally reviewed the imaging below and made my comment in the assessment and plan.    Labs:  CBC RESULTS:   Lab Results   Component Value Date    WBC 7.1 01/04/2021    RBC 4.51 01/04/2021    HGB 11.8 01/04/2021    HCT 36.8 01/04/2021    MCV 82 01/04/2021    MCH 26.2 (L) 01/04/2021    MCHC 32.1 01/04/2021    RDW 16.9 (H) 01/04/2021     01/04/2021       BMP RESULTS:  Lab Results   Component Value Date     03/14/2023     01/04/2021    POTASSIUM 3.6 12/07/2023    POTASSIUM 3.5 03/14/2023    POTASSIUM 3.4 01/04/2021    CHLORIDE 104 03/14/2023    CHLORIDE 99 01/04/2021    CO2 28 03/14/2023    CO2 29 01/04/2021    ANIONGAP 6 03/14/2023    ANIONGAP 6 01/04/2021     (H) 03/14/2023     (H) 01/04/2021    BUN 15 03/14/2023    BUN 10 01/04/2021    CR 0.72 03/14/2023    CR 0.79 01/04/2021    GFRESTIMATED >90 03/14/2023    GFRESTIMATED 87 01/04/2021    GFRESTBLACK >90 01/04/2021    ALEXANDER 9.3 03/14/2023     ALEXANDER 9.0 01/04/2021        Echocardiogram 1/9/2024  Left ventricular function is decreased. The ejection fraction is 50-55%  (borderline).  Global right ventricular function is normal.  No significant valvular abnormalities present.  Pulmonary artery systolic pressure is normal.  Estimated mean right atrial pressure is normal.  No pericardial effusion is present.    Reviewed EKG in clinic today shows sinus rhythm and LVH.      Assessment and Plan:     # History of hypertension for over 20 years, currently not well-controlled  # History of preeclampsia 20 years ago  # Proteinuria (745 mg/gram creatinine)  Patient is currently asymptomatic.  Echocardiogram showed LVEF 50 to 55% with mild LVH.  I think this is consistent with longstanding hypertension.  Aldosterone renin ratio is normal.    Plan:  -Continue hydrochlorothiazide 25 mg  -Continue lisinopril l 40 mg  -Stop metoprolol  -Start labetalol 100 mg twice daily  -Start spironolactone 25 mg daily  -BMP in 2 weeks  -Patient stopped amlodipine due to lower extremity edema  -Cut down on salt    # Diabetes type 2 uncontrolled  -She is only on metformin.  I referred her to endocrinology during my previous visit 3 months ago.  She has not scheduled appointment yet.    # Hyperlipidemia  -Patient is not taking atorvastatin.  LDL is 101.  -Start Crestor 10 mg    Return to clinic 3 months.    Total time spent today for this visit is 26 minutes including precharting, face-to-face clinic visit, review of labs/imaging and medical documentation.    Marsha VALERO MD  [facility] Division of Cardiology  Pager [phone]

## 2024-04-22 ENCOUNTER — TELEPHONE (OUTPATIENT)
Dept: CARDIOLOGY | Facility: CLINIC | Age: 54
End: 2024-04-22

## 2024-04-22 NOTE — TELEPHONE ENCOUNTER
Patient was scheduled for labs 4/2 and no showed appointment. Called and LVM for pt to return call to clinic scheduler to reschedule lab work for Dr. Sorto.    ELDER Byrd

## 2024-04-22 NOTE — TELEPHONE ENCOUNTER
----- Message from Roxana Lindquist RN sent at 4/19/2024  3:51 PM CDT -----  Regarding: Lab appointment  Patient was to have a BMP a few weeks after follow up with Dr. Sorto. Patient no showed for lab appointment. Can we offer to schedule this for patient?

## 2024-04-26 ENCOUNTER — LAB (OUTPATIENT)
Dept: LAB | Facility: CLINIC | Age: 54
End: 2024-04-26
Payer: COMMERCIAL

## 2024-04-26 DIAGNOSIS — I10 PRIMARY HYPERTENSION: ICD-10-CM

## 2024-04-26 LAB
ANION GAP SERPL CALCULATED.3IONS-SCNC: 10 MMOL/L (ref 7–15)
BUN SERPL-MCNC: 18.8 MG/DL (ref 6–20)
CALCIUM SERPL-MCNC: 9.4 MG/DL (ref 8.6–10)
CHLORIDE SERPL-SCNC: 98 MMOL/L (ref 98–107)
CREAT SERPL-MCNC: 0.87 MG/DL (ref 0.51–0.95)
DEPRECATED HCO3 PLAS-SCNC: 28 MMOL/L (ref 22–29)
EGFRCR SERPLBLD CKD-EPI 2021: 79 ML/MIN/1.73M2
GLUCOSE SERPL-MCNC: 286 MG/DL (ref 70–99)
POTASSIUM SERPL-SCNC: 4 MMOL/L (ref 3.4–5.3)
SODIUM SERPL-SCNC: 136 MMOL/L (ref 135–145)

## 2024-04-26 PROCEDURE — 80048 BASIC METABOLIC PNL TOTAL CA: CPT

## 2024-04-26 PROCEDURE — 36415 COLL VENOUS BLD VENIPUNCTURE: CPT

## 2024-05-04 DIAGNOSIS — G47.9 SLEEP DISTURBANCE: ICD-10-CM

## 2024-05-06 ENCOUNTER — NURSE TRIAGE (OUTPATIENT)
Dept: NURSING | Facility: CLINIC | Age: 54
End: 2024-05-06

## 2024-05-06 RX ORDER — TRAZODONE HYDROCHLORIDE 50 MG/1
TABLET, FILM COATED ORAL
Qty: 90 TABLET | Refills: 0 | Status: SHIPPED | OUTPATIENT
Start: 2024-05-06 | End: 2024-07-03

## 2024-05-06 NOTE — TELEPHONE ENCOUNTER
Patient is calling wanting her Trazadone refilled.  Chart is reviewed and it is noted that Trazadone refill was sent to the pharmacy today.  Patient updated to let her know that it had been sent in.  Patient verbalized understanding information, no triage.      Reason for Disposition   Follow-up information-only call to recent contact, no triage required    Additional Information   Negative: Nursing judgment   Negative: Nursing judgment   Negative: Nursing judgment   Negative: Requesting lab results and adult stable (no new symptoms, not worsening)   Negative: Requesting referral to a specialist   Negative: Questions about durable medical equipment ordered and triager unable to answer   Negative: Requesting regular office appointment and adult stable (no new symptoms, not worsening)   Negative: Health information question, no triage required and triager able to answer question   Negative: General information question, no triage required and triager able to answer question   Negative: Question about upcoming scheduled surgery, procedure or test, no triage required, and triager able to answer question   Negative: Caller is not with the adult (patient) AND patient has probable NON-URGENT symptoms    Protocols used: Information Only Call - No Triage-A-OH

## 2024-05-21 ENCOUNTER — TELEPHONE (OUTPATIENT)
Dept: FAMILY MEDICINE | Facility: CLINIC | Age: 54
End: 2024-05-21

## 2024-05-21 NOTE — TELEPHONE ENCOUNTER
Patient Quality Outreach    Patient is due for the following:   Diabetes -  LDL (Fasting), Eye Exam, Microalbumin, and Foot Exam  Colon Cancer Screening  Breast Cancer Screening - Mammogram  Physical Preventive Adult Physical    Next Steps:   Schedule a Adult Preventative    Type of outreach:    Sent letter.      Questions for provider review:    None           Kathrine Roa MA

## 2024-05-21 NOTE — LETTER
May 21, 2024      Lay Solis  [address] [place] [id number]        Your team at [facility] cares about your health. We have reviewed your chart and based on our findings; we are making the following recommendations to better manage your health.     You are in particular need of attention regarding the following:     Schedule a DIABETIC EYE EXAM.  This exam is done with an optometrist, annually. You can schedule this appointment with your eye doctor.  If you need a referral, please let us know.  Call or MyChart message your clinic to schedule a colonoscopy, schedule/ a FIT Test, or order a Cologuard test. If you are unsure what type of test you need, please call your clinic and speak to clinic staff.   Colon cancer is now the second leading cause of cancer-related deaths in the [place] for both men and women and there are over 130,000 new cases and 50,000 deaths per year from colon cancer. Colonoscopies can prevent 90-95% of these deaths. Problem lesions can be removed before they ever become cancer. This test is not only looking for cancer, but also getting rid of precancerous lesions.   If you are under/uninsured, we recommend you contact the [employer] is a free colorectal cancer screening program that provides colonoscopies for eligible under/uninsured [place] men and women. If you are interested in receiving a free colonoscopy, please call [employer] at t [phone] (mention code [employer]) to see if you're eligible. Please have them send us the results.   Contact your clinic to schedule/ your FIT Test.   Schedule Annual MAMMOGRAPHY. [facility] scheduling number is [phone] or schedule in [facility] (self referral).  1 in 8 women will develop invasive breast cancer during her lifetime and it is the most common non-skin cancer in [place]. EARLY detection, new treatments, and a better understanding of the disease have increased survival  rates- the 5 year survival rate in the 1960's was 63% and today it is close to 90%.  If you are under/uninsured, we recommend you contact the [facility]. They offer mammograms at no charge or on a sliding fee charge. You can schedule with them at [phone]. Please have them send us the results.   PREVENTATIVE VISIT: Physical    If you have already completed these items, please contact the clinic via phone or   MyChart so your care team can review and update your records. Thank you for   choosing [facility] for your healthcare needs. For any questions,   concerns, or to schedule an appointment please contact our clinic.    Healthy Regards,      Your [facility] Care Team

## 2024-06-17 DIAGNOSIS — E11.29 TYPE 2 DIABETES MELLITUS WITH MICROALBUMINURIA, WITHOUT LONG-TERM CURRENT USE OF INSULIN (H): ICD-10-CM

## 2024-06-17 DIAGNOSIS — R80.9 TYPE 2 DIABETES MELLITUS WITH MICROALBUMINURIA, WITHOUT LONG-TERM CURRENT USE OF INSULIN (H): ICD-10-CM

## 2024-06-17 RX ORDER — LANCETS
EACH MISCELLANEOUS
Qty: 200 EACH | Refills: 0 | Status: SHIPPED | OUTPATIENT
Start: 2024-06-17

## 2024-07-03 DIAGNOSIS — G47.9 SLEEP DISTURBANCE: ICD-10-CM

## 2024-07-03 RX ORDER — TRAZODONE HYDROCHLORIDE 50 MG/1
TABLET, FILM COATED ORAL
Qty: 90 TABLET | Refills: 0 | Status: SHIPPED | OUTPATIENT
Start: 2024-07-03 | End: 2024-09-19

## 2024-09-18 DIAGNOSIS — G47.9 SLEEP DISTURBANCE: ICD-10-CM

## 2024-09-19 RX ORDER — TRAZODONE HYDROCHLORIDE 50 MG/1
TABLET, FILM COATED ORAL
Qty: 90 TABLET | Refills: 0 | Status: SHIPPED | OUTPATIENT
Start: 2024-09-19

## 2024-09-30 DIAGNOSIS — E11.29 TYPE 2 DIABETES MELLITUS WITH MICROALBUMINURIA, WITHOUT LONG-TERM CURRENT USE OF INSULIN (H): ICD-10-CM

## 2024-09-30 DIAGNOSIS — R80.9 TYPE 2 DIABETES MELLITUS WITH MICROALBUMINURIA, WITHOUT LONG-TERM CURRENT USE OF INSULIN (H): ICD-10-CM

## 2024-09-30 RX ORDER — BLOOD SUGAR DIAGNOSTIC
STRIP MISCELLANEOUS
Qty: 100 STRIP | Refills: 0 | Status: SHIPPED | OUTPATIENT
Start: 2024-09-30

## 2024-10-11 ENCOUNTER — TELEPHONE (OUTPATIENT)
Dept: FAMILY MEDICINE | Facility: CLINIC | Age: 54
End: 2024-10-11

## 2024-10-11 NOTE — TELEPHONE ENCOUNTER
----- Message from LEW Liu sent at 5/3/2023  1:41 PM CDT -----  Please call patient and let them know results. Normal blood cts  Thyroid is abnormal - decrease thyroid to 100mcg daily. Repeat TSH in 6 weeks. I sent the 100mcg daily med to pharmacy. Patient Quality Outreach    Patient is due for the following:   Diabetes -  A1C, Eye Exam, and Microalbumin  Physical Preventive Adult Physical    Next Steps:   Schedule a Adult Preventative    Type of outreach:    Sent letter.    Next Steps:  Reach out within 90 days via Phone.    Max number of attempts reached: No. Will try again in 90 days if patient still on fail list.    Questions for provider review:    None           Babita Guan MA

## 2024-10-27 DIAGNOSIS — E11.21 TYPE 2 DIABETES MELLITUS WITH MACROALBUMINURIC DIABETIC NEPHROPATHY (H): ICD-10-CM

## 2024-10-28 NOTE — TELEPHONE ENCOUNTER
A gap in medication is defined as 90 days from when medication should have run out. When gap is identified, route to corresponding triage team to contact patient and confirm reason for gap. Please instruct triage team to route reason for refill request and encounter to prescribing provider for review.  Beatriz Wakefield RN

## 2024-10-29 RX ORDER — METFORMIN HYDROCHLORIDE 500 MG/1
1000 TABLET, EXTENDED RELEASE ORAL
Qty: 180 TABLET | Refills: 0 | Status: SHIPPED | OUTPATIENT
Start: 2024-10-29

## 2024-10-29 NOTE — TELEPHONE ENCOUNTER
Spoke with patient. She explains that she does not take metformin regularly, however, does take it as prescribed most of the time depending on her blood sugar readings.    She does not have record of BG readings to report. Advised that Metformin is prescribed as taking 2 tablet by mouth daily with breakfast, and emphasized importance of taking medication as prescribed. Advised if patient had taken medication regularly, she would have ran out by around 9/2023. Advised follow-up appointment is recommended.    Patient has upcoming preventative visit scheduled on 12/31 at 4:30 pm.     Patient still requesting refill of medication, despite not taking as prescribed.    Please advise.    JOEL Romero RN  [facility]

## 2024-11-26 DIAGNOSIS — G47.9 SLEEP DISTURBANCE: ICD-10-CM

## 2024-11-26 RX ORDER — TRAZODONE HYDROCHLORIDE 50 MG/1
TABLET, FILM COATED ORAL
Qty: 90 TABLET | Refills: 0 | Status: SHIPPED | OUTPATIENT
Start: 2024-11-26

## 2025-02-05 DIAGNOSIS — G47.9 SLEEP DISTURBANCE: ICD-10-CM

## 2025-02-06 RX ORDER — TRAZODONE HYDROCHLORIDE 50 MG/1
TABLET ORAL
Qty: 90 TABLET | Refills: 0 | Status: SHIPPED | OUTPATIENT
Start: 2025-02-06

## 2025-02-22 DIAGNOSIS — I10 HTN, GOAL BELOW 140/90: ICD-10-CM

## 2025-02-26 RX ORDER — HYDROCHLOROTHIAZIDE 25 MG/1
25 TABLET ORAL DAILY
Qty: 90 TABLET | Refills: 0 | Status: SHIPPED | OUTPATIENT
Start: 2025-02-26

## 2025-02-26 RX ORDER — LISINOPRIL 40 MG/1
40 TABLET ORAL DAILY
Qty: 90 TABLET | Refills: 0 | Status: SHIPPED | OUTPATIENT
Start: 2025-02-26

## 2025-02-26 NOTE — TELEPHONE ENCOUNTER
Name from pharmacy: Lisinopril Oral Tablet 40 MG          Will file in chart as: lisinopril (ZESTRIL) 40 MG tablet    Sig: Take 1 tablet (40 mg) by mouth daily    Disp: 90 tablet    Refills: 0    Start: 2/22/2025    Class: E-Prescribe    Non-formulary For: HTN, goal below 140/90    Last ordered: 11 months ago (3/19/2024) by Marsah Sorto MD    Last refill: 1/31/2025    Rx #: [id number]    ACE Inhibitors (Including Combos) Protocol Pxhrnz2202/22/2025 09:47 AM   Protocol Details Most recent blood pressure under 140/90 in past 12 months- Clinicial or Patient Reported    Medication is active on med list and the sig matches. RN to manually verify dose and sig if red X/fail.    Medication indicated for associated diagnosis    Recent (12 mo) or future (90 days) visit within the authorizing provider's specialty    Most recent GFR on file in the past 12 months >30    Patient is age 18 or older    No active pregnancy on record    Normal serum potassium on file in past 12 months    No positive pregnancy test within past 12 months       Name from pharmacy: hydroCHLOROthiazide Oral Tablet 25 MG         Will file in chart as: hydrochlorothiazide (HYDRODIURIL) 25 MG tablet    Sig: Take 1 tablet (25 mg) by mouth daily    Disp: 90 tablet    Refills: 0    Start: 2/22/2025    Class: E-Prescribe    Non-formulary For: HTN, goal below 140/90    Last ordered: 11 months ago (3/19/2024) by Marsha Sorto MD    Last refill: 1/31/2025    Rx #: [id number]    Diuretics (Including Combos) Protocol [id number]/22/2025 09:47 AM   Protocol Details Most recent blood pressure under 140/90 in past 12 months    Potassium level on file in past 12 months    Medication is active on med list and the sig matches. RN to manually verify dose and sig if red X/fail.    Has GFR on file in past 12 months and most recent value is normal    Recent (12 mo) or future (90 days) visit within the authorizing provider's specialty    Patient is age 18 or older    No active pregancy on  record    No positive pregnancy test in past 12 months      To be filled at: [id number] - [place], [place] - [address] [place] [address]     Patient saw Dr. Sorto on 3/19/24. Patient was to continue lisinopril 40 mg daily. Patient was to continue hydrochlorothiazide 25 mg daily. Patient was to follow up in 3 months. Patient overdue for follow up. Lanette refill sent to patient.    Roxana Lindquist RN, BSN  Cardiology RN Care Coordinator   [place]   Phone: [phone]  Fax: [phone] ([place]) [phone] ([place])

## 2025-02-26 NOTE — TELEPHONE ENCOUNTER
Called and LVM for patient asking her to return call to  to schedule follow-up with cardiology for future medication refills.    ELDER Byrd

## 2025-03-03 NOTE — TELEPHONE ENCOUNTER
Called and LVM for patient to return call to clinic scheduler to schedule cardiology follow-up for future medication refills. Explained that future refills cannot be provided without a cardiology follow-up.    ELDER Byrd

## 2025-03-14 ENCOUNTER — TELEPHONE (OUTPATIENT)
Dept: FAMILY MEDICINE | Facility: CLINIC | Age: 55
End: 2025-03-14

## 2025-03-14 DIAGNOSIS — E11.21 TYPE 2 DIABETES MELLITUS WITH MACROALBUMINURIC DIABETIC NEPHROPATHY (H): ICD-10-CM

## 2025-03-14 RX ORDER — METFORMIN HYDROCHLORIDE 500 MG/1
1000 TABLET, EXTENDED RELEASE ORAL
Qty: 180 TABLET | Refills: 0 | Status: SHIPPED | OUTPATIENT
Start: 2025-03-14

## 2025-03-18 NOTE — TELEPHONE ENCOUNTER
Spoke to Lay and scheduled physical/diabetes with Dr. Miguel Ángel Alexander on 4/23/25.  Cherri Castellano CMA

## 2025-05-02 DIAGNOSIS — E89.0 POSTSURGICAL HYPOTHYROIDISM: ICD-10-CM

## 2025-05-04 RX ORDER — LEVOTHYROXINE SODIUM 175 UG/1
175 TABLET ORAL DAILY
Qty: 20 TABLET | Refills: 0 | Status: SHIPPED | OUTPATIENT
Start: 2025-05-04

## 2025-05-26 DIAGNOSIS — I10 HTN, GOAL BELOW 140/90: Primary | ICD-10-CM

## 2025-05-28 RX ORDER — HYDROCHLOROTHIAZIDE 25 MG/1
25 TABLET ORAL DAILY
Qty: 90 TABLET | Refills: 0 | Status: SHIPPED | OUTPATIENT
Start: 2025-05-28 | End: 2025-05-29

## 2025-05-28 RX ORDER — LISINOPRIL 40 MG/1
40 TABLET ORAL DAILY
Qty: 90 TABLET | Refills: 0 | Status: SHIPPED | OUTPATIENT
Start: 2025-05-28 | End: 2025-05-29

## 2025-05-28 NOTE — TELEPHONE ENCOUNTER
Last Visit Date: 3/19/2024 [facility] [place]    Future Visit Date: 7/30/2025  ----------------------  Medication Requested: hydrochlorothiazide (HYDRODIURIL) 25 MG tablet  Last Written Prescription: 2/26/2025 Disp:90 R:0  ---------------------  Medication Requested: lisinopril (ZESTRIL) 40 MG tablet   Last Written Prescription: 2/26/2025 Disp:90 R:0  ---------------------  []  Refill decision: Medication refilled per  Medication Refill in Ambulatory Care  policy.     []  Supervision: no future appointment scheduled. Scheduling has been notified to contact the pt for appointment.      [x]  Refill decision: Medication unable to be refilled by RN due to:   [x]    Pt not seen within past 12 months and FOV exceeds timeframe. Plan last visit 3/19/2024 RTC in 3 months.  Future visit is scheduled for 7/30/2025  [x]    No Shows  []    Verification - order discrepancy, clarification needed, Sig modification needed  []    Controlled medication  []    Medication not included in refill protocol policy  []    Abnormal labs/test:  [x]    Overdue labs/test:  GFR, Potassium level  []    Medication not active on Pt's med list  []    Drug interaction Warning  []    Medication - Last script is Reported/Historical/Transitional  []    Advanced refill request  []    Review Needed: New med; Med adjusted within <= 30 days; Safety Alert; Lab monitoring required  [x]    Other: ericka refills previously given    Request from pharmacy:  Requested Prescriptions   Pending Prescriptions Disp Refills    hydrochlorothiazide (HYDRODIURIL) 25 MG tablet [Pharmacy Med Name: hydroCHLOROthiazide Oral Tablet 25 MG] 90 tablet 0     Sig: Take 1 tablet (25 mg) by mouth daily       Diuretics (Including Combos) Protocol Failed - 5/28/2025 12:47 PM        Failed - Most recent blood pressure under 140/90 in past 12 months     BP Readings from Last 3 Encounters:   03/19/24 (!) 151/95   12/20/23 (!) 157/101   12/07/23 132/80       No data  recorded            Failed - Potassium level on file in past 12 months        Failed - Has GFR on file in past 12 months and most recent value is normal        Passed - Medication is active on med list and the sig matches. RN to manually verify dose and sig if red X/fail.     If the protocol passes (green check), you do not need to verify med dose and sig.    A prescription matches if they are the same clinical intention.    For Example: once daily and every morning are the same.    The protocol can not identify upper and lower case letters as matching and will fail.     For Example: Take 1 tablet (50 mg) by mouth daily     TAKE 1 TABLET (50 MG) BY MOUTH DAILY    For all fails (red x), verify dose and sig.    If the refill does match what is on file, the RN can still proceed to approve the refill request.       If they do not match, route to the appropriate provider.             Passed - Recent (12 month) or future (90 days) visit with authorizing provider's specialty (provided they have been seen in the past 15 months)     The patient must have completed an in-person or virtual visit within the past 12 months or has a future visit scheduled within the next 90 days with the authorizing provider s specialty.  Urgent care and e-visits do not qualify as an office visit for this protocol.          Passed - Patient is age 18 or older        Passed - No active pregancy on record        Passed - No positive pregnancy test in past 12 months          lisinopril (ZESTRIL) 40 MG tablet [Pharmacy Med Name: Lisinopril Oral Tablet 40 MG] 90 tablet 0     Sig: Take 1 tablet (40 mg) by mouth daily       ACE Inhibitors (Including Combos) Protocol Failed - 5/28/2025 12:47 PM        Failed - Most recent blood pressure under 140/90 in past 12 months- Clinicial or Patient Reported     BP Readings from Last 3 Encounters:   03/19/24 (!) 151/95   12/20/23 (!) 157/101   12/07/23 132/80       No data recorded            Failed - Most recent  GFR on file in the past 12 months >30        Failed - Normal serum potassium on file in past 12 months     Recent Labs   Lab Test 04/26/24  1157   POTASSIUM 4.0             Passed - Medication is active on med list and the sig matches. RN to manually verify dose and sig if red X/fail.     If the protocol passes (green check), you do not need to verify med dose and sig.    A prescription matches if they are the same clinical intention.    For Example: once daily and every morning are the same.    The protocol can not identify upper and lower case letters as matching and will fail.     For Example: Take 1 tablet (50 mg) by mouth daily     TAKE 1 TABLET (50 MG) BY MOUTH DAILY    For all fails (red x), verify dose and sig.    If the refill does match what is on file, the RN can still proceed to approve the refill request.       If they do not match, route to the appropriate provider.             Passed - Medication indicated for associated diagnosis     Medication is associated with one or more of the following diagnoses:     Chronic Kidney Disease (CKD)   Coronary Artery Disease (CAD)   Diabetes   Heart Failure (HF)   Hypertension (HTN)   Nephropathy   History of myocarditis   Tachycardia induced cardiomyopathy   STEMI (ST elevation myocardial infarction)   Spontaneous dissection of coronary artery   Status post percutaneous transluminal coronary angioplasty   Cardiomyopathy            Passed - Recent (12 month) or future (90 days) visit with authorizing provider's specialty (provided they have been seen in the past 15 months)     The patient must have completed an in-person or virtual visit within the past 12 months or has a future visit scheduled within the next 90 days with the authorizing provider s specialty.  Urgent care and e-visits do not qualify as an office visit for this protocol.          Passed - Patient is age 18 or older        Passed - No active pregnancy on record        Passed - No positive pregnancy  test within past 12 months

## 2025-05-29 ENCOUNTER — PATIENT OUTREACH (OUTPATIENT)
Dept: CARE COORDINATION | Facility: CLINIC | Age: 55
End: 2025-05-29

## 2025-05-29 ENCOUNTER — OFFICE VISIT (OUTPATIENT)
Dept: FAMILY MEDICINE | Facility: CLINIC | Age: 55
End: 2025-05-29
Payer: COMMERCIAL

## 2025-05-29 ENCOUNTER — RESULTS FOLLOW-UP (OUTPATIENT)
Dept: FAMILY MEDICINE | Facility: CLINIC | Age: 55
End: 2025-05-29

## 2025-05-29 VITALS
WEIGHT: 163.6 LBS | HEIGHT: 65 IN | DIASTOLIC BLOOD PRESSURE: 86 MMHG | SYSTOLIC BLOOD PRESSURE: 138 MMHG | RESPIRATION RATE: 12 BRPM | TEMPERATURE: 97.7 F | HEART RATE: 70 BPM | OXYGEN SATURATION: 100 % | BODY MASS INDEX: 27.26 KG/M2

## 2025-05-29 DIAGNOSIS — Z12.11 SCREEN FOR COLON CANCER: ICD-10-CM

## 2025-05-29 DIAGNOSIS — E11.9 TYPE 2 DIABETES MELLITUS WITHOUT COMPLICATION, WITH LONG-TERM CURRENT USE OF INSULIN (H): Primary | ICD-10-CM

## 2025-05-29 DIAGNOSIS — M17.12 PRIMARY OSTEOARTHRITIS OF LEFT KNEE: ICD-10-CM

## 2025-05-29 DIAGNOSIS — N18.1 CHRONIC KIDNEY DISEASE, STAGE 1: ICD-10-CM

## 2025-05-29 DIAGNOSIS — Z00.00 ROUTINE GENERAL MEDICAL EXAMINATION AT A HEALTH CARE FACILITY: Primary | ICD-10-CM

## 2025-05-29 DIAGNOSIS — G47.9 SLEEP DISTURBANCE: ICD-10-CM

## 2025-05-29 DIAGNOSIS — I10 HTN, GOAL BELOW 140/90: ICD-10-CM

## 2025-05-29 DIAGNOSIS — E11.21 TYPE 2 DIABETES MELLITUS WITH MACROALBUMINURIC DIABETIC NEPHROPATHY (H): ICD-10-CM

## 2025-05-29 DIAGNOSIS — Z12.31 VISIT FOR SCREENING MAMMOGRAM: ICD-10-CM

## 2025-05-29 DIAGNOSIS — Z79.4 TYPE 2 DIABETES MELLITUS WITHOUT COMPLICATION, WITH LONG-TERM CURRENT USE OF INSULIN (H): Primary | ICD-10-CM

## 2025-05-29 DIAGNOSIS — E89.0 POSTSURGICAL HYPOTHYROIDISM: ICD-10-CM

## 2025-05-29 DIAGNOSIS — Z28.20 VACCINE REFUSED BY PATIENT: ICD-10-CM

## 2025-05-29 PROBLEM — N92.0 MENORRHAGIA WITH REGULAR CYCLE: Status: ACTIVE | Noted: 2017-07-13

## 2025-05-29 PROBLEM — E11.29 TYPE 2 DIABETES MELLITUS WITH RENAL COMPLICATION (H): Status: ACTIVE | Noted: 2017-07-13

## 2025-05-29 LAB
BASOPHILS # BLD AUTO: 0.1 10E3/UL (ref 0–0.2)
BASOPHILS NFR BLD AUTO: 1 %
EOSINOPHIL # BLD AUTO: 0.1 10E3/UL (ref 0–0.7)
EOSINOPHIL NFR BLD AUTO: 2 %
ERYTHROCYTE [DISTWIDTH] IN BLOOD BY AUTOMATED COUNT: 12.8 % (ref 10–15)
EST. AVERAGE GLUCOSE BLD GHB EST-MCNC: 214 MG/DL
HBA1C MFR BLD: 9.1 % (ref 0–5.6)
HCT VFR BLD AUTO: 39.7 % (ref 35–47)
HGB BLD-MCNC: 13.7 G/DL (ref 11.7–15.7)
IMM GRANULOCYTES # BLD: 0.1 10E3/UL
IMM GRANULOCYTES NFR BLD: 1 %
LYMPHOCYTES # BLD AUTO: 1.5 10E3/UL (ref 0.8–5.3)
LYMPHOCYTES NFR BLD AUTO: 23 %
MCH RBC QN AUTO: 28.9 PG (ref 26.5–33)
MCHC RBC AUTO-ENTMCNC: 34.5 G/DL (ref 31.5–36.5)
MCV RBC AUTO: 84 FL (ref 78–100)
MONOCYTES # BLD AUTO: 0.4 10E3/UL (ref 0–1.3)
MONOCYTES NFR BLD AUTO: 7 %
NEUTROPHILS # BLD AUTO: 4.1 10E3/UL (ref 1.6–8.3)
NEUTROPHILS NFR BLD AUTO: 66 %
NRBC # BLD AUTO: 0 10E3/UL
NRBC BLD AUTO-RTO: 0 /100
PLATELET # BLD AUTO: 257 10E3/UL (ref 150–450)
RBC # BLD AUTO: 4.74 10E6/UL (ref 3.8–5.2)
WBC # BLD AUTO: 6.2 10E3/UL (ref 4–11)

## 2025-05-29 RX ORDER — ROSUVASTATIN CALCIUM 10 MG/1
10 TABLET, COATED ORAL DAILY
Qty: 90 TABLET | Refills: 3 | Status: SHIPPED | OUTPATIENT
Start: 2025-05-29 | End: 2025-05-29

## 2025-05-29 RX ORDER — SENNOSIDES 8.6 MG
650 CAPSULE ORAL EVERY 8 HOURS PRN
Qty: 90 TABLET | Refills: 0 | Status: SHIPPED | OUTPATIENT
Start: 2025-05-29

## 2025-05-29 RX ORDER — METFORMIN HYDROCHLORIDE 500 MG/1
1000 TABLET, EXTENDED RELEASE ORAL
Qty: 180 TABLET | Refills: 0 | Status: SHIPPED | OUTPATIENT
Start: 2025-05-29

## 2025-05-29 RX ORDER — LABETALOL 100 MG/1
100 TABLET, FILM COATED ORAL 2 TIMES DAILY
Qty: 180 TABLET | Refills: 3 | Status: SHIPPED | OUTPATIENT
Start: 2025-05-29

## 2025-05-29 RX ORDER — TRAZODONE HYDROCHLORIDE 50 MG/1
50 TABLET ORAL AT BEDTIME
Qty: 90 TABLET | Refills: 0 | Status: SHIPPED | OUTPATIENT
Start: 2025-05-29

## 2025-05-29 RX ORDER — HYDROCHLOROTHIAZIDE 25 MG/1
25 TABLET ORAL DAILY
Qty: 90 TABLET | Refills: 0 | Status: SHIPPED | OUTPATIENT
Start: 2025-05-29

## 2025-05-29 RX ORDER — ROSUVASTATIN CALCIUM 10 MG/1
10 TABLET, COATED ORAL DAILY
Qty: 90 TABLET | Refills: 3 | Status: SHIPPED | OUTPATIENT
Start: 2025-05-29

## 2025-05-29 RX ORDER — LISINOPRIL 40 MG/1
40 TABLET ORAL DAILY
Qty: 90 TABLET | Refills: 0 | Status: SHIPPED | OUTPATIENT
Start: 2025-05-29

## 2025-05-29 NOTE — LETTER
June 2, 2025      Lay Solis  [address] [place] [address]  [place] [address]        Dear ,    We are writing to inform you of your test results.    There is a small amount of protein in your urine.  This is suggestive of early damage to your kidneys from the diabetes.  You are already on a medication that helps protect your kidneys.  Continue on this and continue to monitor your blood pressure carefully.  These tests should be completed yearly.     Please contact the clinic if you have additional questions.  Thank you.  Resulted Orders   Lipid panel reflex to direct LDL Non-fasting   Result Value Ref Range    Cholesterol 233 (H) <200 mg/dL    Triglycerides 113 <150 mg/dL    Direct Measure HDL 52 >=50 mg/dL    LDL Cholesterol Calculated 158 (H) <100 mg/dL    Non HDL Cholesterol 181 (H) <130 mg/dL    Patient Fasting > 8hrs? Yes     Narrative    Cholesterol  Desirable: < 200 mg/dL  Borderline High: 200 - 239 mg/dL  High: >= 240 mg/dL    Triglycerides  Normal: < 150 mg/dL  Borderline High: 150 - 199 mg/dL  High: 200-499 mg/dL  Very High: >= 500 mg/dL    Direct Measure HDL  Female: >= 50 mg/dL   Male: >= 40 mg/dL    LDL Cholesterol  Desirable: < 100 mg/dL  Above Desirable: 100 - 129 mg/dL   Borderline High: 130 - 159 mg/dL   High:  160 - 189 mg/dL   Very High: >= 190 mg/dL    Non HDL Cholesterol  Desirable: < 130 mg/dL  Above Desirable: 130 - 159 mg/dL  Borderline High: 160 - 189 mg/dL  High: 190 - 219 mg/dL  Very High: >= 220 mg/dL   Albumin Random Urine Quantitative with Creat Ratio   Result Value Ref Range    Creatinine Urine mg/dL 15.7 mg/dL      Comment:      The reference ranges have not been established in urine creatinine. The results should be integrated into the clinical context for interpretation.    Albumin Urine mg/L 85.3 mg/L      Comment:      The reference ranges have not been established in urine albumin. The results should be integrated into the clinical context for interpretation.    Albumin Urine mg/g  Cr 543.31 (H) 0.00 - 25.00 mg/g Cr      Comment:      Microalbuminuria is defined as an albumin:creatinine ratio of 17 to 299 for males and 25 to 299 for females. A ratio of albumin:creatinine of 300 or higher is indicative of overt proteinuria.  Due to biologic variability, positive results should be confirmed by a second, first-morning random or 24-hour timed urine specimen. If there is discrepancy, a third specimen is recommended. When 2 out of 3 results are in the microalbuminuria range, this is evidence for incipient nephropathy and warrants increased efforts at glucose control, blood pressure control, and institution of therapy with an angiotensin-converting-enzyme (ACE) inhibitor (if the patient can tolerate it).     HEMOGLOBIN A1C   Result Value Ref Range    Estimated Average Glucose 214 (H) <117 mg/dL    Hemoglobin A1C 9.1 (H) 0.0 - 5.6 %      Comment:      Normal <5.7%   Prediabetes 5.7-6.4%    Diabetes 6.5% or higher     Note: Adopted from ADA consensus guidelines.   Basic metabolic panel  (Ca, Cl, CO2, Creat, Gluc, K, Na, BUN)   Result Value Ref Range    Sodium 137 135 - 145 mmol/L    Potassium 3.5 3.4 - 5.3 mmol/L    Chloride 95 (L) 98 - 107 mmol/L    Carbon Dioxide (CO2) 27 22 - 29 mmol/L    Anion Gap 15 7 - 15 mmol/L    Urea Nitrogen 13.8 6.0 - 20.0 mg/dL    Creatinine 0.81 0.51 - 0.95 mg/dL    GFR Estimate 86 >60 mL/min/1.73m2      Comment:      eGFR calculated using 2021 CKD-EPI equation.    Calcium 9.5 8.8 - 10.4 mg/dL    Glucose 196 (H) 70 - 99 mg/dL    Patient Fasting > 8hrs? Yes    TSH with free T4 reflex   Result Value Ref Range    TSH 0.09 (L) 0.30 - 4.20 uIU/mL   CBC with platelets and differential   Result Value Ref Range    WBC Count 6.2 4.0 - 11.0 10e3/uL    RBC Count 4.74 3.80 - 5.20 10e6/uL    Hemoglobin 13.7 11.7 - 15.7 g/dL    Hematocrit 39.7 35.0 - 47.0 %    MCV 84 78 - 100 fL    MCH 28.9 26.5 - 33.0 pg    MCHC 34.5 31.5 - 36.5 g/dL    RDW 12.8 10.0 - 15.0 %    Platelet Count 257 150  - 450 10e3/uL    % Neutrophils 66 %    % Lymphocytes 23 %    % Monocytes 7 %    % Eosinophils 2 %    % Basophils 1 %    % Immature Granulocytes 1 %    NRBCs per 100 WBC 0 <1 /100    Absolute Neutrophils 4.1 1.6 - 8.3 10e3/uL    Absolute Lymphocytes 1.5 0.8 - 5.3 10e3/uL    Absolute Monocytes 0.4 0.0 - 1.3 10e3/uL    Absolute Eosinophils 0.1 0.0 - 0.7 10e3/uL    Absolute Basophils 0.1 0.0 - 0.2 10e3/uL    Absolute Immature Granulocytes 0.1 <=0.4 10e3/uL    Absolute NRBCs 0.0 10e3/uL   T4 free   Result Value Ref Range    Free T4 1.83 (H) 0.90 - 1.70 ng/dL   If you have any questions or concerns, please call the clinic at the number listed above.       Sincerely,      SUKHI Rogers CNP  Electronically signed

## 2025-05-29 NOTE — PROGRESS NOTES
Preventive Care Visit  [facility] SUKHI Solano CNP, Family Medicine  May 29, 2025      Assessment & Plan     (Z00.00) Routine general medical examination at a health care facility  (primary encounter diagnosis)  Plan: Basic metabolic panel  (Ca, Cl, CO2, Creat,         Gluc, K, Na, BUN), CBC with platelets and   - Encouraged regular physical activity, aiming for 150 minutes of moderate exercise per week  - P-Reviewed recommendations for screening, and  immunizations.  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  Plan: Adult Eye  Referral, Lipid panel         reflex to direct LDL Non-fasting, HEMOGLOBIN         A1C, rosuvastatin (CRESTOR) 10 MG tablet,         metFORMIN (GLUCOPHAGE XR) 500 MG 24 hr tablet,         empagliflozin (JARDIANCE) 25 MG TABS tablet,         Adult Diabetes Education  Referral,   -Start Jardiance 25 mg p.o. daily  - Obtain diabetic educator referral      (M17.12) Primary osteoarthritis of left knee  Plan: diclofenac (VOLTAREN) 1 % topical gel,         acetaminophen (TYLENOL) 650 MG CR tablet, TSH         with free T4 reflex  - She declined physical therapy.  - Consider steroid injection    (I10) HTN, goal below 140/90  Comment: Stable  Plan: hydrochlorothiazide (HYDRODIURIL) 25 MG tablet,        labetalol (NORMODYNE) 100 MG tablet, lisinopril        (ZESTRIL) 40 MG tablet  -The current medical regimen is effective;  continue present plan and medications.     (E89.0) Postsurgical hypothyroidism  Comment: She has a history of thyroid cancer and thyroidectomy.  Currently she is on levothyroxine.  Denies any medication side effect.  Plan:  - Obtain TSH and free T4  - Continue levothyroxine as prescribed    (N18.1) Chronic kidney disease, stage 1  Plan: Albumin Random  "Urine Quantitative with Creat         Ratio, Basic metabolic panel  (Ca, Cl, CO2,         Creat, Gluc, K, Na, BUN)    (G47.9) Sleep disturbance  Comment: Reported not taking trazodone as prescribed.  Plan: traZODone (DESYREL) 50 MG tablet  -Medication refills given.     (Z12.31) Visit for screening mammogram  Plan: MA Screening Bilateral w/ Filiberto    (Z12.11) Screen for colon cancer  Plan: Fecal colorectal cancer screen (FIT)    (Z28.20) Vaccine refused by patient  Plan:   - Vaccine offered, patient declined .  - Pt is asked to call clinic for vaccine if there is any mind change.  - Pt understand risk and benefits of immunizations.                    BMI  Estimated body mass index is 26.93 kg/m  as calculated from the following:    Height as of this encounter: 1.66 m (5' 5.35\").    Weight as of this encounter: 74.2 kg (163 lb 9.6 oz).   Weight management plan: Discussed healthy diet and exercise guidelines    Counseling  Appropriate preventive services were addressed with this patient via screening, questionnaire, or discussion as appropriate for fall prevention, nutrition, physical activity, Tobacco-use cessation, social engagement, weight loss and cognition.  Checklist reviewing preventive services available has been given to the patient.  Reviewed patient's diet, addressing concerns and/or questions.   The patient was instructed to see the dentist every 6 months.           Monroe Chun is a 54 year old, presenting for the following:  Annual Visit        5/29/2025    11:45 AM   Additional Questions   Roomed by Lu NUÑEZ CMA         5/29/2025    11:45 AM   Patient Reported Additional Medications   Patient reports taking the following new medications none         CORWIN Solis is a 54 year old female presents for complete physical examination.   The patient does not exercise. The patient is not up to date with immunizations but declined all due immunizations. The patient has no family history of colon " cancer or breast cancer.      Lay  presents complaining of  chronic left knee pain. Periods of exacerbations are becoming more frequent and occur while weight-bearing.   The patient  also complains of occasional locking and instability.          Diabetes Follow-up    How often are you checking your blood sugar? One time daily  What time of day are you checking your blood sugars (select all that apply)?  After meals  Have you had any blood sugars above 200?  No  Have you had any blood sugars below 70?  No  What symptoms do you notice when your blood sugar is low?  None  What concerns do you have today about your diabetes? None   Do you have any of these symptoms? (Select all that apply)  No numbness or tingling in feet.  No redness, sores or blisters on feet.  No complaints of excessive thirst.  No reports of blurry vision.  No significant changes to weight.  Have you had a diabetic eye exam in the last 12 months? No            Hyperlipidemia Follow-Up    Are you regularly taking any medication or supplement to lower your cholesterol?   No  Are you having muscle aches or other side effects that you think could be caused by your cholesterol lowering medication?  No  She stopped taking statins.  Hypertension Follow-up    Do you check your blood pressure regularly outside of the clinic? Yes   Are you following a low salt diet? No  Are your blood pressures ever more than 140 on the top number (systolic) OR more   than 90 on the bottom number (diastolic), for example 140/90? No    BP Readings from Last 2 Encounters:   05/29/25 138/86   03/19/24 (!) 151/95     Hemoglobin A1C (%)   Date Value   05/29/2025 9.1 (H)   12/07/2023 9.5 (H)   01/04/2021 6.4 (H)   09/03/2019 6.2 (H)     LDL Cholesterol Calculated (mg/dL)   Date Value   03/14/2023 101 (H)   04/12/2022 116 (H)   01/04/2021 127 (H)   09/03/2019 97     Hypothyroidism Follow-up    Since last visit, patient describes the following symptoms: Weight stable, no hair loss,  no skin changes, no constipation, no loose stools  Advance Care Planning    Discussed advance care planning with patient; however, patient declined at this time.        5/29/2025   General Health   How would you rate your overall physical health? (!) POOR         5/29/2025   Nutrition   Three or more servings of calcium each day? (!) I DON'T KNOW   Diet: Regular (no restrictions)    Other   If other, please elaborate: tries to diet   How many servings of fruit and vegetables per day? (!) 0-1   How many sweetened beverages each day? (!) 2       Multiple values from one day are sorted in reverse-chronological order          No data to display                  5/29/2025   Social Factors   Worry food won't last until get money to buy more No   Food not last or not have enough money for food? No   Do you have housing? (Housing is defined as stable permanent housing and does not include staying outside in a car, in a tent, in an abandoned building, in an overnight shelter, or couch-surfing.) Yes   Are you worried about losing your housing? No   Lack of transportation? No   Unable to get utilities (heat,electricity)? No         5/29/2025   Fall Risk   Fallen 2 or more times in the past year? No    Trouble with walking or balance? Yes        Proxy-reported           5/29/2025   Dental   Dentist two times every year? (!) NO         Today's PHQ-2 Score:       5/29/2025    11:45 AM   PHQ-2 ( 1999 [employer])   Q1: Little interest or pleasure in doing things 0    Q2: Feeling down, depressed or hopeless 1    PHQ-2 Score 1    Q1: Little interest or pleasure in doing things Not at all   Q2: Feeling down, depressed or hopeless Several days   PHQ-2 Score 1       Proxy-reported           5/29/2025   Substance Use   Alcohol more than 3/day or more than 7/wk No   Do you use any other substances recreationally? No     Social History     Tobacco Use    Smoking status: Never    Smokeless tobacco: Never   Vaping Use    Vaping status: Never  "Used   Substance Use Topics    Alcohol use: No    Drug use: No                  5/29/2025   STI Screening   New sexual partner(s) since last STI/HIV test? (!) DECLINE     History of abnormal Pap smear: No        Latest Ref Rng & Units 6/1/2022    11:17 AM 11/15/2018     8:17 AM 11/15/2018     8:16 AM   PAP / HPV   PAP  Negative for Intraepithelial Lesion or Malignancy (NILM)      PAP (Historical)    NIL    HPV 16 DNA Negative Negative  Negative     HPV 18 DNA Negative Negative  Negative     Other HR HPV Negative Negative  Negative       ASCVD Risk   The 10-year ASCVD risk score (Harley TANNER, et al., 2019) is: 16.4%    Values used to calculate the score:      Age: 54 years      Sex: Female      Is Non- : Yes      Diabetic: Yes      Tobacco smoker: No      Systolic Blood Pressure: 138 mmHg      Is BP treated: Yes      HDL Cholesterol: 45 mg/dL      Total Cholesterol: 194 mg/dL           Reviewed and updated as needed this visit by Provider                          Review of Systems  Constitutional, HEENT, cardiovascular, pulmonary, GI, , musculoskeletal, neuro, skin, endocrine and psych systems are negative, except as otherwise noted.     Objective    Exam  /86   Pulse 70   Temp 97.7  F (36.5  C) (Temporal)   Resp 12   Ht 1.66 m (5' 5.35\")   Wt 74.2 kg (163 lb 9.6 oz)   LMP 01/02/2020 (Approximate)   SpO2 100%   BMI 26.93 kg/m     Estimated body mass index is 26.93 kg/m  as calculated from the following:    Height as of this encounter: 1.66 m (5' 5.35\").    Weight as of this encounter: 74.2 kg (163 lb 9.6 oz).    Physical Exam  GENERAL: alert and no distress  EYES: Eyes grossly normal to inspection, PERRL and conjunctivae and sclerae normal  HENT: ear canals and TM's normal, nose and mouth without ulcers or lesions  NECK: no adenopathy, no asymmetry, masses, or scars  RESP: lungs clear to auscultation - no rales, rhonchi or wheezes  CV: regular rate and rhythm, normal S1 " S2, no S3 or S4, no murmur, click or rub, no peripheral edema  ABDOMEN: soft, nontender, no hepatosplenomegaly, no masses and bowel sounds normal  MS: Left knee: Tenderness on the lateral side of the knee.  Decreased range of motion due to pain.  History of osteoarthritis  SKIN: no suspicious lesions or rashes  NEURO: Normal strength and tone, mentation intact and speech normal  PSYCH: mentation appears normal, affect normal/bright        Signed Electronically by: SUKHI Rogers CNP

## 2025-05-29 NOTE — TELEPHONE ENCOUNTER
BMP ordered.  Rx filled.  Appt scheduled for July dr christian.    Donna Cooper, RN  Cardiology Care Coordinator  [facility]  [phone] option 1

## 2025-05-29 NOTE — PATIENT INSTRUCTIONS
Patient Education   Preventive Care Advice   This is general advice given by our system to help you stay healthy. However, your care team may have specific advice just for you. Please talk to your care team about your preventive care needs.  Nutrition  Eat 5 or more servings of fruits and vegetables each day.  Try wheat bread, brown rice and whole grain pasta (instead of white bread, rice, and pasta).  Get enough calcium and vitamin D. Check the label on foods and aim for 100% of the RDA (recommended daily allowance).  Lifestyle  Exercise at least 150 minutes each week  (30 minutes a day, 5 days a week).  Do muscle strengthening activities 2 days a week. These help control your weight and prevent disease.  No smoking.  Wear sunscreen to prevent skin cancer.  Have a dental exam and cleaning every 6 months.  Yearly exams  See your health care team every year to talk about:  Any changes in your health.  Any medicines your care team has prescribed.  Preventive care, family planning, and ways to prevent chronic diseases.  Shots (vaccines)   HPV shots (up to age 26), if you've never had them before.  Hepatitis B shots (up to age 59), if you've never had them before.  COVID-19 shot: Get this shot when it's due.  Flu shot: Get a flu shot every year.  Tetanus shot: Get a tetanus shot every 10 years.  Pneumococcal, hepatitis A, and RSV shots: Ask your care team if you need these based on your risk.  Shingles shot (for age 50 and up)  General health tests  Diabetes screening:  Starting at age 35, Get screened for diabetes at least every 3 years.  If you are younger than age 35, ask your care team if you should be screened for diabetes.  Cholesterol test: At age 39, start having a cholesterol test every 5 years, or more often if advised.  Bone density scan (DEXA): At age 50, ask your care team if you should have this scan for osteoporosis (brittle bones).  Hepatitis C: Get tested at least once in your life.  STIs (sexually  transmitted infections)  Before age 24: Ask your care team if you should be screened for STIs.  After age 24: Get screened for STIs if you're at risk. You are at risk for STIs (including HIV) if:  You are sexually active with more than one person.  You don't use condoms every time.  You or a partner was diagnosed with a sexually transmitted infection.  If you are at risk for HIV, ask about PrEP medicine to prevent HIV.  Get tested for HIV at least once in your life, whether you are at risk for HIV or not.  Cancer screening tests  Cervical cancer screening: If you have a cervix, begin getting regular cervical cancer screening tests starting at age 21.  Breast cancer scan (mammogram): If you've ever had breasts, begin having regular mammograms starting at age 40. This is a scan to check for breast cancer.  Colon cancer screening: It is important to start screening for colon cancer at age 45.  Have a colonoscopy test every 10 years (or more often if you're at risk) Or, ask your provider about stool tests like a FIT test every year or Cologuard test every 3 years.  To learn more about your testing options, visit:   .  For help making a decision, visit:   https://bit.ly/jq03994.  Prostate cancer screening test: If you have a prostate, ask your care team if a prostate cancer screening test (PSA) at age 55 is right for you.  Lung cancer screening: If you are a current or former smoker ages 50 to 80, ask your care team if ongoing lung cancer screenings are right for you.  For informational purposes only. Not to replace the advice of your health care provider. Copyright   2023 [facility]. All rights reserved. Clinically reviewed by the [facility]. [employer] 891596 - REV 01/24.  Preventing Falls: Care Instructions  Injuries and health problems such as trouble walking or poor eyesight can increase your risk of falling. So can some medicines. But there are things you can do to help  "prevent falls. You can exercise to get stronger. You can also arrange your home to make it safer.    Talk to your doctor about the medicines you take. Ask if any of them increase the risk of falls and whether they can be changed or stopped.   Try to exercise regularly. It can help improve your strength and balance. This can help lower your risk of falling.         Practice fall safety and prevention.   Wear low-heeled shoes that fit well and give your feet good support. Talk to your doctor if you have foot problems that make this hard.  Carry a cellphone or wear a medical alert device that you can use to call for help.  Use stepladders instead of chairs to reach high objects. Don't climb if you're at risk for falls. Ask for help, if needed.  Wear the correct eyeglasses, if you need them.        Make your home safer.   Remove rugs, cords, clutter, and furniture from walkways.  Keep your house well lit. Use night-lights in hallways and bathrooms.  Install and use sturdy handrails on stairways.  Wear nonskid footwear, even inside. Don't walk barefoot or in socks without shoes.        Be safe outside.   Use handrails, curb cuts, and ramps whenever possible.  Keep your hands free by using a shoulder bag or backpack.  Try to walk in well-lit areas. Watch out for uneven ground, changes in pavement, and debris.  Be careful in the winter. Walk on the grass or gravel when sidewalks are slippery. Use de-icer on steps and walkways. Add non-slip devices to shoes.    Put grab bars and nonskid mats in your shower or tub and near the toilet. Try to use a shower chair or bath bench when bathing.   Get into a tub or shower by putting in your weaker leg first. Get out with your strong side first. Have a phone or medical alert device in the bathroom with you.   Where can you learn more?  Go to https://www.[employer]wise.net/patiented  Enter G117 in the search box to learn more about \"Preventing Falls: Care Instructions.\"  Current as of: " July 31, 2024  Content Version: 14.4    0963-4308 [employer].   Care instructions adapted under license by your healthcare professional. If you have questions about a medical condition or this instruction, always ask your healthcare professional. [employer] disclaims any warranty or liability for your use of this information.

## 2025-05-29 NOTE — LETTER
May 29, 2025      Lay Solis  [address] [place] [address]  [place] [address]        Dear ,    We are writing to inform you of your test results. Please call the patient and let her know that her diabetes is poorly controlled. Please make sure she takes metformin 1000 mg twice daily. I also added Jardiance 25 mg p.o. daily. I also obtain diabetic educator referral. Please encourage her to take these medications as prescribed.     Resulted Orders   HEMOGLOBIN A1C   Result Value Ref Range    Estimated Average Glucose 214 (H) <117 mg/dL    Hemoglobin A1C 9.1 (H) 0.0 - 5.6 %      Comment:      Normal <5.7%   Prediabetes 5.7-6.4%    Diabetes 6.5% or higher     Note: Adopted from ADA consensus guidelines.   If you have any questions or concerns, please call the clinic at the number listed above.       Sincerely,      SUKHI Rogers CNP    Electronically signed

## 2025-05-30 RX ORDER — ASPIRIN 81 MG/1
81 TABLET ORAL DAILY
Qty: 90 TABLET | Refills: 3 | Status: SHIPPED | OUTPATIENT
Start: 2025-05-30

## 2025-05-30 NOTE — RESULT ENCOUNTER NOTE
Please call patient regarding lab results.  Her thyroid test was abnormal.  We need to decrease the dose of levothyroxine to 150 mcg.  I discontinue levothyroxine 175 mcg.  A new prescription was sent to the pharmacy.  Patient needs to recheck labs in 6 weeks.  She also has elevated lipid panel.  Please encourage you to restart cholesterol medication.

## 2025-06-02 ENCOUNTER — PATIENT OUTREACH (OUTPATIENT)
Dept: CARE COORDINATION | Facility: CLINIC | Age: 55
End: 2025-06-02
Payer: COMMERCIAL

## 2025-06-02 NOTE — RESULT ENCOUNTER NOTE
MsKasandra Solis,    There is a small amount of protein in your urine.  This is suggestive of early damage to your kidneys from the diabetes.  You are already on a medication that helps protect your kidneys.  Continue on this and continue to monitor your blood pressure carefully.  These tests should be completed yearly.    Please contact the clinic if you have additional questions.  Thank you.    Sincerely,    SUKHI Rogers CNP

## 2025-07-16 PROBLEM — E11.29 TYPE 2 DIABETES MELLITUS WITH RENAL COMPLICATION (H): Status: RESOLVED | Noted: 2017-07-13 | Resolved: 2025-07-16

## 2025-07-16 PROBLEM — E78.5 HYPERLIPIDEMIA LDL GOAL <100: Status: RESOLVED | Noted: 2017-07-13 | Resolved: 2025-07-16

## 2025-07-16 PROBLEM — E78.00 HIGH CHOLESTEROL: Status: ACTIVE | Noted: 2017-07-13

## 2025-07-16 PROBLEM — E11.9 TYPE 2 DIABETES MELLITUS WITHOUT COMPLICATION (H): Status: RESOLVED | Noted: 2017-07-13 | Resolved: 2025-07-16

## 2025-08-17 ENCOUNTER — TELEPHONE (OUTPATIENT)
Dept: FAMILY MEDICINE | Facility: CLINIC | Age: 55
End: 2025-08-17
Payer: COMMERCIAL

## 2025-08-26 DIAGNOSIS — G47.9 SLEEP DISTURBANCE: ICD-10-CM

## 2025-08-27 ENCOUNTER — OFFICE VISIT (OUTPATIENT)
Dept: INTERNAL MEDICINE | Facility: CLINIC | Age: 55
End: 2025-08-27
Payer: COMMERCIAL

## 2025-08-27 ENCOUNTER — ANCILLARY PROCEDURE (OUTPATIENT)
Dept: GENERAL RADIOLOGY | Facility: CLINIC | Age: 55
End: 2025-08-27
Payer: COMMERCIAL

## 2025-08-27 VITALS
HEART RATE: 75 BPM | WEIGHT: 162 LBS | BODY MASS INDEX: 26.99 KG/M2 | HEIGHT: 65 IN | SYSTOLIC BLOOD PRESSURE: 130 MMHG | TEMPERATURE: 97.6 F | DIASTOLIC BLOOD PRESSURE: 85 MMHG | RESPIRATION RATE: 14 BRPM | OXYGEN SATURATION: 98 %

## 2025-08-27 DIAGNOSIS — G89.29 CHRONIC PAIN OF LEFT KNEE: Primary | ICD-10-CM

## 2025-08-27 DIAGNOSIS — G89.29 CHRONIC PAIN OF LEFT KNEE: ICD-10-CM

## 2025-08-27 DIAGNOSIS — E11.21 TYPE 2 DIABETES MELLITUS WITH MACROALBUMINURIC DIABETIC NEPHROPATHY (H): ICD-10-CM

## 2025-08-27 DIAGNOSIS — M25.562 CHRONIC PAIN OF LEFT KNEE: Primary | ICD-10-CM

## 2025-08-27 DIAGNOSIS — M25.562 CHRONIC PAIN OF LEFT KNEE: ICD-10-CM

## 2025-08-27 LAB
EST. AVERAGE GLUCOSE BLD GHB EST-MCNC: 240 MG/DL
HBA1C MFR BLD: 10 % (ref 0–5.6)

## 2025-08-27 PROCEDURE — 99214 OFFICE O/P EST MOD 30 MIN: CPT

## 2025-08-27 PROCEDURE — 73560 X-RAY EXAM OF KNEE 1 OR 2: CPT | Mod: TC | Performed by: RADIOLOGY

## 2025-08-27 PROCEDURE — G2211 COMPLEX E/M VISIT ADD ON: HCPCS

## 2025-08-27 PROCEDURE — 83036 HEMOGLOBIN GLYCOSYLATED A1C: CPT

## 2025-08-27 PROCEDURE — 36415 COLL VENOUS BLD VENIPUNCTURE: CPT

## 2025-08-27 RX ORDER — PREDNISONE 10 MG/1
TABLET ORAL
Qty: 20 TABLET | Refills: 0 | Status: SHIPPED | OUTPATIENT
Start: 2025-08-27

## 2025-08-27 RX ORDER — TRAZODONE HYDROCHLORIDE 50 MG/1
50 TABLET ORAL AT BEDTIME
Qty: 90 TABLET | Refills: 0 | Status: SHIPPED | OUTPATIENT
Start: 2025-08-27

## 2025-08-27 ASSESSMENT — PAIN SCALES - GENERAL: PAINLEVEL_OUTOF10: SEVERE PAIN (7)

## 2025-08-28 ENCOUNTER — PATIENT OUTREACH (OUTPATIENT)
Dept: CARE COORDINATION | Facility: CLINIC | Age: 55
End: 2025-08-28
Payer: COMMERCIAL

## 2025-08-28 ENCOUNTER — TELEPHONE (OUTPATIENT)
Dept: FAMILY MEDICINE | Facility: CLINIC | Age: 55
End: 2025-08-28
Payer: COMMERCIAL

## 2025-08-28 DIAGNOSIS — M25.562 CHRONIC PAIN OF LEFT KNEE: Primary | ICD-10-CM

## 2025-08-28 DIAGNOSIS — G89.29 CHRONIC PAIN OF LEFT KNEE: Primary | ICD-10-CM

## 2025-08-28 RX ORDER — MELOXICAM 15 MG/1
15 TABLET ORAL DAILY
Qty: 30 TABLET | Refills: 1 | Status: SHIPPED | OUTPATIENT
Start: 2025-08-28

## 2025-09-01 ENCOUNTER — PATIENT OUTREACH (OUTPATIENT)
Dept: CARE COORDINATION | Facility: CLINIC | Age: 55
End: 2025-09-01
Payer: COMMERCIAL